# Patient Record
Sex: FEMALE | Race: WHITE | NOT HISPANIC OR LATINO | Employment: OTHER | URBAN - METROPOLITAN AREA
[De-identification: names, ages, dates, MRNs, and addresses within clinical notes are randomized per-mention and may not be internally consistent; named-entity substitution may affect disease eponyms.]

---

## 2017-01-09 ENCOUNTER — OFFICE VISIT (OUTPATIENT)
Dept: FAMILY MEDICINE | Facility: CLINIC | Age: 52
End: 2017-01-09
Payer: COMMERCIAL

## 2017-01-09 VITALS
HEIGHT: 67 IN | BODY MASS INDEX: 22.41 KG/M2 | WEIGHT: 142.8 LBS | OXYGEN SATURATION: 99 % | DIASTOLIC BLOOD PRESSURE: 82 MMHG | HEART RATE: 72 BPM | SYSTOLIC BLOOD PRESSURE: 128 MMHG | TEMPERATURE: 98.2 F

## 2017-01-09 DIAGNOSIS — R42 VERTIGO: Primary | ICD-10-CM

## 2017-01-09 DIAGNOSIS — A04.72 C. DIFFICILE DIARRHEA: ICD-10-CM

## 2017-01-09 LAB
C DIFF TOX B STL QL: NORMAL
SPECIMEN SOURCE: NORMAL

## 2017-01-09 PROCEDURE — 87493 C DIFF AMPLIFIED PROBE: CPT | Mod: 90 | Performed by: NURSE PRACTITIONER

## 2017-01-09 PROCEDURE — 99214 OFFICE O/P EST MOD 30 MIN: CPT | Performed by: NURSE PRACTITIONER

## 2017-01-09 PROCEDURE — 99000 SPECIMEN HANDLING OFFICE-LAB: CPT | Performed by: NURSE PRACTITIONER

## 2017-01-09 RX ORDER — HYDROCHLOROTHIAZIDE 12.5 MG/1
12.5 TABLET ORAL DAILY
Qty: 30 TABLET | Refills: 1 | Status: SHIPPED | OUTPATIENT
Start: 2017-01-09 | End: 2017-03-30

## 2017-01-09 NOTE — Clinical Note
[facility]          [address] [place] [place] [address]                                                                                                       [phone]      Khushi Sawant  [address] [place]   [facility] [address]      Dear Khushi,      The results of your recent test were normal.  Enclosed is a copy of the results.      Thank you for choosing [facility]. We appreciate the opportunity to serve you and look forward to supporting your healthcare needs in the future.    If you have any questions or concerns, please call me or my nurse at [phone].        Sincerely,      Manjula Sargent NP/Bela Ontiveros     Results for orders placed or performed in visit on 01/09/17   Clostridium difficile toxin B PCR   Result Value Ref Range    Specimen Description Feces     C Diff Toxin B PCR  NEG     Negative  Negative: Clostridium difficile target DNA sequences NOT detected, presumed   negative for Clostridium difficile toxin B or the number of bacteria present   may be below the limit of detection for the test.   FDA approved assay performed using [employer] GeneXpert real-time PCR.   A negative result does not exclude actual disease due to Clostridium difficile   and may be due to improper collection, handling and storage of the specimen or   the number of organisms in the specimen is below the detection limit of the   assay.

## 2017-01-09 NOTE — NURSING NOTE
"Chief Complaint   Patient presents with     Dizziness       Initial /82 mmHg  Pulse 72  Temp(Src) 98.2  F (36.8  C) (Oral)  Ht 5' 6.5\" (1.689 m)  Wt 142 lb 12.8 oz (64.774 kg)  BMI 22.71 kg/m2  SpO2 99% Estimated body mass index is 22.71 kg/(m^2) as calculated from the following:    Height as of this encounter: 5' 6.5\" (1.689 m).    Weight as of this encounter: 142 lb 12.8 oz (64.774 kg).  BP completed using cuff size: regular Veronica Pérez CMA      "

## 2017-01-09 NOTE — PROGRESS NOTES
SUBJECTIVE:                                                    Khushi Sawant is a 51 year old female who presents to clinic today for the following health issues:      Dizziness      Duration: before thanksgiving     Description   Feeling faint:  YES  Feeling like the surroundings are moving: YES  Loss of consciousness or falls: no     Intensity:  Severe on Saturday     Accompanying signs and symptoms:   Nausea/vomitting: YES  Palpitations: YES  Weakness in arms or legs: YES  Vision or speech changes: no   Ringing in ears (Tinnitus): YES- humming  Hearing loss related to dizziness: no   Other (fevers/chills/sweating/dyspnea): no     History (similar episodes/head trauma/previous evaluation/recent bleeding): yes - due to ear infections in the past     Precipitating or alleviating factors (new meds/chemicals): None  Worse with activity/head movement: no     Therapies tried and outcome: None   Khushi is here with several concerns.  1. Dizziness that has been present on and off for the past 2 months. She has been using meclizine over-the-counter with some relief. Patient is getting more upset because symptoms seem to be more frequent. Patient states she is just not feeling well. She spent the entire weekend in bed with nausea. Dizziness is interfering with her active lifestyle.    2. Questions about C. Difficile colitis. Patient was diagnosed with C. Difficile and recently finished a course ofFlagyl. She is hoping to be retested to make sure the infection has resolved. She is feeling much better since taking the antibiotic.She continues to take probiotics and is aware of the restrictions for future antibiotic use.          Problem list and histories reviewed & adjusted, as indicated.  Additional history: none    Problem list, Medication list, Allergies, and Medical/Social/Surgical histories reviewed in [facility] and updated as appropriate.    ROS:  Constitutional, HEENT, cardiovascular, pulmonary, gi and gu  "systems are negative, except as otherwise noted.    OBJECTIVE:                                                    /82 mmHg  Pulse 72  Temp(Src) 98.2  F (36.8  C) (Oral)  Ht 5' 6.5\" (1.689 m)  Wt 142 lb 12.8 oz (64.774 kg)  BMI 22.71 kg/m2  SpO2 99%  Body mass index is 22.71 kg/(m^2).  GENERAL: healthy, alert and no distress  EYES: Eyes grossly normal to inspection, PERRL and conjunctivae and sclerae normal  HENT: ear canals and TM's normal, nose and mouth without ulcers or lesions  NECK: no adenopathy, no asymmetry, masses, or scars and thyroid normal to palpation  RESP: lungs clear to auscultation - no rales, rhonchi or wheezes  CV: regular rate and rhythm, normal S1 S2, no S3 or S4, no murmur, click or rub, no peripheral edema and peripheral pulses strong         ASSESSMENT/PLAN:                                                            1. C. difficile diarrhea  Will retest to make sure C. Difficile toxin is cleared. Encourage use of probiotics daily.  - Clostridium difficile toxin B PCR; Future  - Clostridium difficile toxin B PCR    2. Vertigo   Will refer to dizzy and balance center to help with vertigo. Continue with meclizine as directed. I have added hydrochlorothiazide as a means to get rid of some of the extra fluid within the sinus cavities. I have seen it Used in the past by ENT specialists to help with vertigo.      Follow up in clinic if symptoms do not improve within the next 1-2 weeks.     Manjula Sargent, NP  [facility]    "

## 2017-01-09 NOTE — MR AVS SNAPSHOT
After Visit Summary   1/9/2017    Khushi Sawant    MRN: [id number]           Patient Information     Date Of Birth          1965        Visit Information        Provider Department      1/9/2017 10:00 AM Manjula Sargent NP [facility]        Today's Diagnoses     Vertigo    -  1     C. difficile diarrhea            Follow-ups after your visit        Additional Services     PHYSICAL THERAPY REFERRAL       *This therapy referral will be filtered to a centralized scheduling office at [facility] and the patient will receive a call to schedule an appointment at a [facility] location most convenient for them. *     [facility] provides Physical Therapy evaluation and treatment and many specialty services across the [facility] system.  If requesting a specialty program, please choose from the list below.    If you have not heard from the scheduling office within 2 business days, please call [phone] for all locations, with the exception of [place], please call [phone].  Treatment: Evaluation & Treatment  Special Instructions/Modalities:   Special Programs: Balance/Vestibular    Please be aware that coverage of these services is subject to the terms and limitations of your health insurance plan.  Call member services at your health plan with any benefit or coverage questions.      **Note to Provider:  If you are referring outside of [facility] for the therapy appointment, please list the name of the location in the  special instructions  above, print the referral and give to the patient to schedule the appointment.                  Your next 10 appointments already scheduled     Jan 12, 2017 10:20 AM   Office Visit with Manjula Sargent NP   [facility]    [address] [facility] [address] [phone]           Bring a current list of meds and any records pertaining to this visit.   "For Physicals, please bring immunization records and any forms needing to be filled out.  Please arrive 10 minutes early to complete paperwork.            2017  8:45 AM   Evaluation with Hardy Gandara PT   [facility] ([facility])    [address]  [facility] [id number] [phone]              Who to contact     If you have questions or need follow up information about today's clinic visit or your schedule please contact [facility] directly at [phone].  Normal or non-critical lab and imaging results will be communicated to you by [employer]at[employer], letter or phone within 4 business days after the clinic has received the results. If you do not hear from us within 7 days, please contact the clinic through [employer] or phone. If you have a critical or abnormal lab result, we will notify you by phone as soon as possible.  Submit refill requests through [employer] or call your pharmacy and they will forward the refill request to us. Please allow 3 business days for your refill to be completed.          Additional Information About Your Visit        [employer] Information     [employer] lets you send messages to your doctor, view your test results, renew your prescriptions, schedule appointments and more. To sign up, go to [email or web address] . Click on \"Log in\" on the left side of the screen, which will take you to the Welcome page. Then click on \"Sign up Now\" on the right side of the page.     You will be asked to enter the access code listed below, as well as some personal information. Please follow the directions to create your username and password.     Your access code is: [email or web address]  Expires: 2017  2:13 PM     Your access code will  in 90 days. If you need help or a new code, please call your [place] clinic or [phone].        Care EveryWhere ID     This is your Care EveryWhere ID. This could be used by other organizations to access " "your [place] medical records  [id number]        Your Vitals Were     Pulse Temperature Height BMI (Body Mass Index) Pulse Oximetry       72 98.2  F (36.8  C) (Oral) 5' 6.5\" (1.689 m) 22.71 kg/m2 99%        Blood Pressure from Last 3 Encounters:   01/09/17 128/82   12/26/16 130/66   11/16/16 128/80    Weight from Last 3 Encounters:   01/09/17 142 lb 12.8 oz (64.774 kg)   12/26/16 143 lb (64.864 kg)   11/16/16 142 lb 11.2 oz (64.728 kg)              We Performed the Following     Clostridium difficile toxin B PCR     PHYSICAL THERAPY REFERRAL          Today's Medication Changes          These changes are accurate as of: 1/9/17 11:59 PM.  If you have any questions, ask your nurse or doctor.               Start taking these medicines.        Dose/Directions    hydrochlorothiazide 12.5 MG Tabs tablet   Started by:  Manjula Sargent NP        Dose:  12.5 mg   Take 1 tablet (12.5 mg) by mouth daily   Quantity:  30 tablet   Refills:  1         Stop taking these medicines if you haven't already. Please contact your care team if you have questions.     martínez root 550 MG Caps capsule   Stopped by:  Manjula Sargent NP           ranitidine 150 MG/10ML syrup   Commonly known as:  Zantac   Stopped by:  Manjula Sargent NP                Where to get your medicines      These medications were sent to [facility] [address] [place] [address] [id number] [place] [address] AT [address] [address] [facility] [address]     Phone:  [phone]    - hydrochlorothiazide 12.5 MG Tabs tablet             Primary Care Provider Office Phone # Fax #    Manjula Sargetn [phone]       [facility] [address]  [facility] [address]        Thank you!     Thank you for choosing [facility]  for your care. Our goal is always to provide you with excellent care. Hearing back from our patients is one way we can continue to improve our services. Please take a few minutes to complete the " written survey that you may receive in the mail after your visit with us. Thank you!             Your Updated Medication List - Protect others around you: Learn how to safely use, store and throw away your medicines at www.disposemymeds.org.          This list is accurate as of: 1/9/17 11:59 PM.  Always use your most recent med list.                   Brand Name Dispense Instructions for use    ACIDOPHILUS PROBIOTIC BLEND Caps      Take by mouth daily       hydrochlorothiazide 12.5 MG Tabs tablet     30 tablet    Take 1 tablet (12.5 mg) by mouth daily       MECLIZINE HCL PO      Take 25 mg by mouth as needed for dizziness       metroNIDAZOLE 500 MG tablet    FLAGYL    30 tablet    Take 1 tablet (500 mg) by mouth 3 times daily

## 2017-01-10 ENCOUNTER — TELEPHONE (OUTPATIENT)
Dept: FAMILY MEDICINE | Facility: CLINIC | Age: 52
End: 2017-01-10

## 2017-01-10 ENCOUNTER — TELEPHONE (OUTPATIENT)
Dept: NURSING | Facility: CLINIC | Age: 52
End: 2017-01-10

## 2017-01-10 NOTE — TELEPHONE ENCOUNTER
"Pt calling she started the hydroxyzine this yesterday and today.   She did eat a hamberger last night, \"it did kind of upset my stomach\"     Pt started with diarrhea today.  \"it's very loose and watery\"   She has gone back to the liquid diet for today.  She did complete full course of flagyl on Sunday.   No stomach pain and vertigo is improved.      Advised to continue clear liquid diet tonight and will discuss with PCP tomorrow.      Call back to cell [phone]    Chiquita Beebe RN    "

## 2017-01-10 NOTE — TELEPHONE ENCOUNTER
Call Type: Triage Call    Presenting Problem: Khushi is asking for results on her c-diff  test that was done yesterday. advised per EPIC, results negative, no  c-diff detected.  Triage Note:  Guideline Title: Information Only Call; No Symptom Triage (Adult)  Recommended Disposition: Provide Information or Advice Only  Original Inclination: Wanted to speak with a nurse  Override Disposition:  Intended Action: Follow advice given  Physician Contacted: No  Requesting information regarding scheduled exam, test or procedure; no triage  required. Information provided from approved resources or clinical experience. ?  YES  Requesting regular office appointment ? NO  Sign(s) or symptom(s) associated with a diagnosed condition or with a new illness  ? NO  Requesting information about provider, services or community resources ? NO  Call back to complete assessment/clarification of information from prior caller to  complete triage ? NO  Requesting information and provider is best resource; no triage required. ? NO  Caller not with patient and is unable to provide clinical information about  patient to facilitate triage. ? NO  Requesting provider information for recently scheduled test, procedure; no triage  required. Needed information not available per approved resources or clinical  experience. ? NO  Requesting information not available per approved reference or clinical  experience; no triage required. ? NO  Physician Instructions:  Care Advice:

## 2017-01-12 ENCOUNTER — OFFICE VISIT (OUTPATIENT)
Dept: FAMILY MEDICINE | Facility: CLINIC | Age: 52
End: 2017-01-12
Payer: COMMERCIAL

## 2017-01-12 VITALS
TEMPERATURE: 98.5 F | HEIGHT: 67 IN | OXYGEN SATURATION: 99 % | DIASTOLIC BLOOD PRESSURE: 80 MMHG | BODY MASS INDEX: 21.56 KG/M2 | SYSTOLIC BLOOD PRESSURE: 110 MMHG | HEART RATE: 71 BPM | WEIGHT: 137.4 LBS

## 2017-01-12 DIAGNOSIS — R11.0 NAUSEA: Primary | ICD-10-CM

## 2017-01-12 LAB
ALBUMIN UR-MCNC: NEGATIVE MG/DL
APPEARANCE UR: CLEAR
BACTERIA #/AREA URNS HPF: ABNORMAL /HPF
BILIRUB UR QL STRIP: NEGATIVE
COLOR UR AUTO: YELLOW
ERYTHROCYTE [DISTWIDTH] IN BLOOD BY AUTOMATED COUNT: 12.2 % (ref 10–15)
GLUCOSE UR STRIP-MCNC: NEGATIVE MG/DL
HCT VFR BLD AUTO: 46.2 % (ref 35–47)
HGB BLD-MCNC: 15.5 G/DL (ref 11.7–15.7)
HGB UR QL STRIP: ABNORMAL
KETONES UR STRIP-MCNC: ABNORMAL MG/DL
LEUKOCYTE ESTERASE UR QL STRIP: NEGATIVE
MCH RBC QN AUTO: 31.6 PG (ref 26.5–33)
MCHC RBC AUTO-ENTMCNC: 33.5 G/DL (ref 31.5–36.5)
MCV RBC AUTO: 94 FL (ref 78–100)
NITRATE UR QL: NEGATIVE
NON-SQ EPI CELLS #/AREA URNS LPF: ABNORMAL /LPF
PH UR STRIP: 6 PH (ref 5–7)
PLATELET # BLD AUTO: 173 10E9/L (ref 150–450)
RBC # BLD AUTO: 4.9 10E12/L (ref 3.8–5.2)
RBC #/AREA URNS AUTO: ABNORMAL /HPF (ref 0–2)
SP GR UR STRIP: 1.01 (ref 1–1.03)
URN SPEC COLLECT METH UR: ABNORMAL
UROBILINOGEN UR STRIP-ACNC: 0.2 EU/DL (ref 0.2–1)
WBC # BLD AUTO: 5.1 10E9/L (ref 4–11)
WBC #/AREA URNS AUTO: ABNORMAL /HPF (ref 0–2)

## 2017-01-12 PROCEDURE — 36415 COLL VENOUS BLD VENIPUNCTURE: CPT | Performed by: NURSE PRACTITIONER

## 2017-01-12 PROCEDURE — 81001 URINALYSIS AUTO W/SCOPE: CPT | Performed by: NURSE PRACTITIONER

## 2017-01-12 PROCEDURE — 99214 OFFICE O/P EST MOD 30 MIN: CPT | Performed by: NURSE PRACTITIONER

## 2017-01-12 PROCEDURE — 85027 COMPLETE CBC AUTOMATED: CPT | Performed by: NURSE PRACTITIONER

## 2017-01-12 RX ORDER — ONDANSETRON 4 MG/1
4 TABLET, FILM COATED ORAL EVERY 6 HOURS PRN
Qty: 18 TABLET | Refills: 0 | Status: SHIPPED | OUTPATIENT
Start: 2017-01-12 | End: 2017-03-30

## 2017-01-12 NOTE — NURSING NOTE
"Chief Complaint   Patient presents with     RECHECK       Initial /80 mmHg  Pulse 71  Temp(Src) 98.5  F (36.9  C) (Oral)  Ht 5' 6.5\" (1.689 m)  Wt 137 lb 6.4 oz (62.324 kg)  BMI 21.85 kg/m2  SpO2 99%  Breastfeeding? No Estimated body mass index is 21.85 kg/(m^2) as calculated from the following:    Height as of this encounter: 5' 6.5\" (1.689 m).    Weight as of this encounter: 137 lb 6.4 oz (62.324 kg).  BP completed using cuff size: regular right arm   JASON Hooper      "

## 2017-01-12 NOTE — PROGRESS NOTES
"  SUBJECTIVE:                                                    Khushi Sawant is a 51 year old female who presents to clinic today for the following health issues:      Patient is here to follow up on vertigo, nausea and not feeling well. Recently treated for c difficile colitis and has since tested negative. Typically feels well and is frustrated with intermittent nausea and vertigo. Has been eating a bland diet and diarrhea and nausea improved. Ate a hamburger on Monday evening and had three two days of diarrhea. Is worried the diarrhea will not improve. Tearful and frustrated as not exercising and has low energy.  has come to the appointment as well. Has some back pain and slight nausea. Vertigo has improved and soft formed stool this morning. Plan is to leave for [place] this weekend if feeling better. Has many questions about cdiff and nausea and infection.         Problem list and histories reviewed & adjusted, as indicated.  Additional history: none    Problem list, Medication list, Allergies, and Medical/Social/Surgical histories reviewed in EPIC and updated as appropriate.    ROS:  Constitutional, HEENT, cardiovascular, pulmonary, gi and gu systems are negative, except as otherwise noted.    OBJECTIVE:                                                    /80 mmHg  Pulse 71  Temp(Src) 98.5  F (36.9  C) (Oral)  Ht 5' 6.5\" (1.689 m)  Wt 137 lb 6.4 oz (62.324 kg)  BMI 21.85 kg/m2  SpO2 99%  Breastfeeding? No  Body mass index is 21.85 kg/(m^2).  GENERAL: healthy, alert and no distress. Frustrated.   RESP: lungs clear to auscultation - no rales, rhonchi or wheezes  CV: regular rate and rhythm, normal S1 S2, no S3 or S4, no murmur, click or rub, no peripheral edema and peripheral pulses strong  ABDOMEN: soft, mildly tender, no hepatosplenomegaly, no masses and bowel sounds normal  BACK: no CVA tenderness, no paralumbar tenderness  PSYCH: mentation appears normal, affect " normal/bright    Results for orders placed or performed in visit on 01/12/17 (from the past 24 hour(s))   CBC with platelets   Result Value Ref Range    WBC 5.1 4.0 - 11.0 10e9/L    RBC Count 4.90 3.8 - 5.2 10e12/L    Hemoglobin 15.5 11.7 - 15.7 g/dL    Hematocrit 46.2 35.0 - 47.0 %    MCV 94 78 - 100 fl    MCH 31.6 26.5 - 33.0 pg    MCHC 33.5 31.5 - 36.5 g/dL    RDW 12.2 10.0 - 15.0 %    Platelet Count 173 150 - 450 10e9/L   UA reflex to Microscopic and Culture   Result Value Ref Range    Color Urine Yellow     Appearance Urine Clear     Glucose Urine Negative NEG mg/dL    Bilirubin Urine Negative NEG    Ketones Urine Trace (A) NEG mg/dL    Specific Gravity Urine 1.015 1.003 - 1.035    Blood Urine Trace (A) NEG    pH Urine 6.0 5.0 - 7.0 pH    Protein Albumin Urine Negative NEG mg/dL    Urobilinogen Urine 0.2 0.2 - 1.0 EU/dL    Nitrite Urine Negative NEG    Leukocyte Esterase Urine Negative NEG    Source Midstream Urine    Urine Microscopic   Result Value Ref Range    WBC Urine O - 2 0 - 2 /HPF    RBC Urine O - 2 0 - 2 /HPF    Squamous Epithelial /LPF Urine Few FEW /LPF    Bacteria Urine Few (A) NEG /HPF        ASSESSMENT/PLAN:                                                            1. Nausea  Will check CBC looking for infection. Zofran for nausea. Much of the visit spent discussing a bland diet and appropriate foods.   - CBC with platelets  - UA reflex to Microscopic and Culture  - ondansetron (ZOFRAN) 4 MG tablet; Take 1 tablet (4 mg) by mouth every 6 hours as needed for nausea  Dispense: 18 tablet; Refill: 0  - Urine Microscopic    Patient is reassured with findings of negative urine and normal CBC. Encouraged bland diet and slowly advancing diet. Avoid spicy foods, alcohol, coffee, and citrus as may irritate her abdomen. No signs of kidney infection and soreness if from nausea and vomiting and possible contaminated hamburger. Encouraged starting to be more active. Follow up as needed.     Manjula Sargent,  NP  [facility]

## 2017-01-23 ENCOUNTER — HOSPITAL ENCOUNTER (OUTPATIENT)
Dept: PHYSICAL THERAPY | Facility: CLINIC | Age: 52
Setting detail: THERAPIES SERIES
End: 2017-01-23
Attending: NURSE PRACTITIONER
Payer: COMMERCIAL

## 2017-01-23 PROCEDURE — 97161 PT EVAL LOW COMPLEX 20 MIN: CPT | Mod: GP | Performed by: PHYSICAL THERAPIST

## 2017-01-23 PROCEDURE — 40000840 ZZHC STATISTIC PT VESTIBULAR VISIT: Performed by: PHYSICAL THERAPIST

## 2017-01-23 NOTE — PROGRESS NOTES
01/23/17 0837   Quick Adds   Type of Visit Initial OP PT Evaluation   General Information   Start of Care Date 01/23/17   Referring Physician Manjula Sargent, NELY   Orders Evaluate and Treat as Indicated   Order Date 01/09/17   Medical Diagnosis Vertigo  (per documentation, none listed on order)   Onset of illness/injury or Date of Surgery (~2 months ago before Thanksgiving)   Precautions/Limitations no known precautions/limitations   Surgical/Medical history reviewed Yes   Pertinent history of current problem (include personal factors and/or comorbidities that impact the POC) Pt presents to PT to address symptoms of vertigo that first began ~1 yr ago after her first colonoscopy with subsequent recurrent bouts of vertigo and ear fullness every 2-3 months.  She had an ear infection around Thanksgiving with symptoms, was placed on antibiotics, symptoms seemed to improved but again had worsening symptoms more recently ~1 week ago.  Pt was prescribed HCT to help eliminate any potential fluid collection within her inner ears which seemed to help, but she discontinued use due to episode of diarrhea, now plans to resume taking it soon, has 2 month supply from her NP.  She was using over the counter Meclizine with some relief, but symptoms have continued to worsen/linger, recently had to spend the entire weekend in bed with nausea and dizziness is interfering with her active lifestyle.  She reports hx of ear/sinus infections with similar symptoms in the past. Recent diagnosis of C-diff, treated with Flagyl and Antibiotics, now finished, but continued loose stools, negative CBC for any infection, trying bland die, plans to resume HCT, and recommended to resume activity per her NP/PCP.  She saw an ENT in 2016 with no answers.  She last took Meclizine in December.  She endorses constant mild tinnitus, denies hearing loss.   Pertinent Visual History  she reports general aging of her eyes/vision, obtained cheaters for reading, no  significant vision issues   Prior level of function comment indep and active PLOF, enjoys running for exercise daily, but has not been able to participate due to symptoms lately   Previous/Current Treatment Medication(s)  (Hydrochlorothyazide, Meclizine, Zofran)   Improvement after medication Moderate   Current Community Support (spouse)   Patient role/Employment history Employed  (self employed as )   Living environment [facility]   Current Assistive Devices (none)   Assistive Devices Comments no use of AD, indep   Patient/Family Goals Statement Pt hopes to get some answers and resolve/manage her symptoms better so she can resume working and exercise   Fall Risk Screen   Fall screen completed by PT   Per patient - Fall 2 or more times in past year? No   Per patient - Fall with injury in past year? No   Is patient a fall risk? No   Fall screen comments 12/12 on 4-item DGI   System Outcome Measures   Outcome Measures BPPV   Dizziness Handicap Inventory (score out of 100) A decrease in score by 17.18 or greater indicates a clinically significant change in symptoms. 24   At end of session, is nystagmus present that is representative of a BPPV pattern with positional testing? No   Pain   Patient currently in pain No   Cognitive Status Examination   Orientation orientation to person, place and time   Integumentary   Integumentary No deficits were identified   Posture   Posture Normal   Range of Motion (ROM)   ROM Comment WFL in all extremities and c-spine   Strength   Strength Comments WFL and symmetrical to MMT screen in jean paul UEs and LEs, 5/5 throughout.   Bed Mobility   Bed Mobility Comments INdep, no dizziness today   Transfer Skills   Transfer Comments Indep, steady on feet, no dizziness today but endorses worse symptoms at times when she stands still, improves with sitting or walking around   Gait   Gait Comments Indep and steady gait, normal gait speed, no AD.  Reciprocal gait pattern, changes speeds and  performs dynamic tasks with ease.   Gait Special Tests   Gait Special Tests DYNAMIC GAIT INDEX   Gait Special Tests Dynamic Gait Index   Comments 12/12 on 4-item DGI   Balance   Balance Comments normal balance, no impairment noted today   Balance Special Tests   Balance Special Tests Modified CTSIB Conditions   Balance Special Tests Modified CTSIB Conditions   Condition 1, seconds 30 Seconds   Condition 2, seconds 30 Seconds   Condition 4, seconds 30 Seconds   Condition 5, seconds 30 Seconds   Sensory Examination   Sensory Perception no deficits were identified   Sensory Perception Comments pt denies hearing loss   Coordination   Coordination no deficits were identified   Coordination Comments normal finger to nose and visual tracking   Muscle Tone   Muscle Tone no deficits were identified   Oculomotor Exam   Smooth Pursuit Normal   Saccades Normal   VOR Normal   Rapid Head Thrust Normal   Convergence Testing Normal   Infrared Goggle Exam or Frenzel Lense Exam   Vestibular Suppressant in Last 24 Hours? No   Exam completed with Infrared Goggles   Spontaneous Nystagmus Negative   Gaze Evoked Nystagmus Negative   Head Shake Horizontal Nystagmus Negative   Positional testing Negative   Positional Testing comments Negative VBI testing bilaterally.  Increase in symptoms with bearing down/valsalva while plugging nose in sitting and standing, symptoms resolve with release of pressure.   Jorge-Hallpike (right) Negative   Jorge-Hallpike (Left) Negative   HSCC Supine Roll Test (Right) Negative   HSCC Supine Roll Test (Left) Negative   Planned Therapy Interventions   Planned Therapy Interventions balance training;neuromuscular re-education;other (see comments)  (CRM as indicated)   Clinical Impression   Criteria for Skilled Therapeutic Interventions Met yes, treatment indicated  (if symptoms recur)   PT Diagnosis Vertigo with impaired functional activity tolerance   Influenced by the following impairments episodic vertigo with  nausea   Functional limitations due to impairments 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, climbing ladders, and return to daily exercise regimine (jogging)   Clinical Presentation Stable/Uncomplicated   Clinical Presentation Rationale low/minimal symptoms on this date, negative CBC and c-diff results, no findings with ENT, no involvment of vestibular or balance system currently   Clinical Decision Making (Complexity) Low complexity   Therapy Frequency (consider 1x/wk if symptoms recur, pt will call within 4 wks)   Predicted Duration of Therapy Intervention (days/wks) 6 weeks   Risk & Benefits of therapy have been explained Yes   Patient, Family & other staff in agreement with plan of care Yes   Clinical Impression Comments Currently so signs of BPPV or vestibular hypofunction, good/normal functional balance.  Episodic symptoms and ability to recreate symptoms with valsalva maneuver consistent with Meniere's or similiar process.  Advised pt to monitor/limit Sodium consumption.   Education Assessment   Preferred Learning Style Listening;Reading;Demonstration;Pictures/video   Barriers to Learning No barriers   GOALS   PT Eval Goals 1   Goal 1   Goal Identifier Activity   Goal Description Pt will report ability to fully return to work related duties as a  and daily jogging exercise program without symptoms for improved QOL.   Target Date 02/24/17   Total Evaluation Time   Total Evaluation Time (Minutes) 35

## 2017-02-03 ENCOUNTER — MYC MEDICAL ADVICE (OUTPATIENT)
Dept: FAMILY MEDICINE | Facility: CLINIC | Age: 52
End: 2017-02-03

## 2017-02-15 ENCOUNTER — MYC MEDICAL ADVICE (OUTPATIENT)
Dept: FAMILY MEDICINE | Facility: CLINIC | Age: 52
End: 2017-02-15

## 2017-02-15 ENCOUNTER — OFFICE VISIT (OUTPATIENT)
Dept: FAMILY MEDICINE | Facility: CLINIC | Age: 52
End: 2017-02-15
Payer: COMMERCIAL

## 2017-02-15 VITALS
TEMPERATURE: 98.4 F | DIASTOLIC BLOOD PRESSURE: 80 MMHG | HEART RATE: 66 BPM | RESPIRATION RATE: 12 BRPM | OXYGEN SATURATION: 98 % | WEIGHT: 139.3 LBS | BODY MASS INDEX: 21.87 KG/M2 | HEIGHT: 67 IN | SYSTOLIC BLOOD PRESSURE: 110 MMHG

## 2017-02-15 DIAGNOSIS — R42 DIZZINESS: Primary | ICD-10-CM

## 2017-02-15 PROCEDURE — 99214 OFFICE O/P EST MOD 30 MIN: CPT | Performed by: NURSE PRACTITIONER

## 2017-02-15 RX ORDER — FLUTICASONE PROPIONATE 50 MCG
1 SPRAY, SUSPENSION (ML) NASAL DAILY
COMMUNITY
End: 2017-03-30

## 2017-02-15 NOTE — NURSING NOTE
"Chief Complaint   Patient presents with     Dizziness     lightheadedness       Initial /80  Pulse 66  Temp 98.4  F (36.9  C) (Oral)  Resp 12  Ht 5' 6.5\" (1.689 m)  Wt 139 lb 4.8 oz (63.2 kg)  SpO2 98%  Breastfeeding? No  BMI 22.15 kg/m2 Estimated body mass index is 22.15 kg/(m^2) as calculated from the following:    Height as of this encounter: 5' 6.5\" (1.689 m).    Weight as of this encounter: 139 lb 4.8 oz (63.2 kg).  Medication Reconciliation: complete   JASON Hooper      "

## 2017-02-15 NOTE — MR AVS SNAPSHOT
After Visit Summary   2/15/2017    Khushi Sawant    MRN: [id number]           Patient Information     Date Of Birth          1965        Visit Information        Provider Department      2/15/2017 1:00 PM Manjula Sargent NP [facility]        Today's Diagnoses     Dizziness    -  1       Follow-ups after your visit        Additional Services     OTOLARYNGOLOGY REFERRAL       Your provider has referred you to: [facility] ER: [facility] [place] [phone]   [email or web address]    Please be aware that coverage of these services is subject to the terms and limitations of your health insurance plan.  Call member services at your health plan with any benefit or coverage questions.      Please bring the following with you to your appointment:    (1) Any X-Rays, CTs or MRIs which have been performed.  Contact the facility where they were done to arrange for  prior to your scheduled appointment.   (2) List of current medications  (3) This referral request   (4) Any documents/labs given to you for this referral                  Future tests that were ordered for you today     Open Future Orders        Priority Expected Expires Ordered    CT Maxillofacial w/o Contrast Routine  2/15/2018 2/15/2017            Who to contact     If you have questions or need follow up information about today's clinic visit or your schedule please contact [facility] directly at [phone].  Normal or non-critical lab and imaging results will be communicated to you by MyChart, letter or phone within 4 business days after the clinic has received the results. If you do not hear from us within 7 days, please contact the clinic through MyChart or phone. If you have a critical or abnormal lab result, we will notify you by phone as soon as possible.  Submit refill requests through [employer] or call your pharmacy and they will forward the refill request to us.  "Please allow 3 business days for your refill to be completed.          Additional Information About Your Visit        [employer] Information     [employer] gives you secure access to your electronic health record. If you see a primary care provider, you can also send messages to your care team and make appointments. If you have questions, please call your primary care clinic.  If you do not have a primary care provider, please call [phone] and they will assist you.        Care EveryWhere ID     This is your Care EveryWhere ID. This could be used by other organizations to access your [facility] medical records  [id number]        Your Vitals Were     Pulse Temperature Respirations Height Pulse Oximetry Breastfeeding?    66 98.4  F (36.9  C) (Oral) 12 5' 6.5\" (1.689 m) 98% No    BMI (Body Mass Index)                   22.15 kg/m2            Blood Pressure from Last 3 Encounters:   02/15/17 110/80   01/12/17 110/80   01/09/17 128/82    Weight from Last 3 Encounters:   02/15/17 139 lb 4.8 oz (63.2 kg)   01/12/17 137 lb 6.4 oz (62.3 kg)   01/09/17 142 lb 12.8 oz (64.8 kg)              We Performed the Following     OTOLARYNGOLOGY REFERRAL        Primary Care Provider Office Phone # Fax #    Manjula Sargent [phone]       [facility] [address] [address] [facility] [address]        Thank you!     Thank you for choosing [facility]  for your care. Our goal is always to provide you with excellent care. Hearing back from our patients is one way we can continue to improve our services. Please take a few minutes to complete the written survey that you may receive in the mail after your visit with us. Thank you!             Your Updated Medication List - Protect others around you: Learn how to safely use, store and throw away your medicines at www.disposemymeds.org.          This list is accurate as of: 2/15/17  1:45 PM.  Always use your most recent med list.                   Brand Name " Dispense Instructions for use    ACIDOPHILUS PROBIOTIC BLEND Caps      Take by mouth daily       fluticasone 50 MCG/ACT spray    FLONASE     Spray 1 spray into both nostrils daily       hydrochlorothiazide 12.5 MG Tabs tablet     30 tablet    Take 1 tablet (12.5 mg) by mouth daily       MECLIZINE HCL PO      Take 25 mg by mouth as needed for dizziness       ondansetron 4 MG tablet    ZOFRAN    18 tablet    Take 1 tablet (4 mg) by mouth every 6 hours as needed for nausea

## 2017-02-15 NOTE — TELEPHONE ENCOUNTER
"Pt calling still having issues with lightheadedness.   She feels it is not so much dizziness at this time.       Pt states she did [facility] and they did not feel she had vertigo.     Pt states last night the symptoms where so bad \"I had heart palpitations and almost passed out\"       Pt advised needs to be seen as this is a change in symptoms     Scheduled with PCP today at 1 pm.     Chiquita Beebe, RN      "

## 2017-02-17 ENCOUNTER — HOSPITAL ENCOUNTER (OUTPATIENT)
Dept: CT IMAGING | Facility: CLINIC | Age: 52
Discharge: HOME | End: 2017-02-17
Attending: NURSE PRACTITIONER | Admitting: NURSE PRACTITIONER
Payer: COMMERCIAL

## 2017-02-17 DIAGNOSIS — R42 DIZZINESS: ICD-10-CM

## 2017-02-17 PROCEDURE — 70486 CT MAXILLOFACIAL W/O DYE: CPT

## 2017-02-22 ENCOUNTER — MYC MEDICAL ADVICE (OUTPATIENT)
Dept: FAMILY MEDICINE | Facility: CLINIC | Age: 52
End: 2017-02-22

## 2017-02-22 DIAGNOSIS — R42 DIZZINESS: Primary | ICD-10-CM

## 2017-02-24 DIAGNOSIS — R42 DIZZINESS: ICD-10-CM

## 2017-02-24 LAB
ALBUMIN SERPL-MCNC: 4.3 G/DL (ref 3.4–5)
ALP SERPL-CCNC: 50 U/L (ref 40–150)
ALT SERPL W P-5'-P-CCNC: 32 U/L (ref 0–50)
ANION GAP SERPL CALCULATED.3IONS-SCNC: 9 MMOL/L (ref 3–14)
AST SERPL W P-5'-P-CCNC: 20 U/L (ref 0–45)
BILIRUB SERPL-MCNC: 0.5 MG/DL (ref 0.2–1.3)
BUN SERPL-MCNC: 21 MG/DL (ref 7–30)
CALCIUM SERPL-MCNC: 9.3 MG/DL (ref 8.5–10.1)
CHLORIDE SERPL-SCNC: 104 MMOL/L (ref 94–109)
CO2 SERPL-SCNC: 27 MMOL/L (ref 20–32)
CREAT SERPL-MCNC: 0.66 MG/DL (ref 0.52–1.04)
GFR SERPL CREATININE-BSD FRML MDRD: ABNORMAL ML/MIN/1.7M2
GLUCOSE SERPL-MCNC: 109 MG/DL (ref 70–99)
POTASSIUM SERPL-SCNC: 3.8 MMOL/L (ref 3.4–5.3)
PROT SERPL-MCNC: 7.2 G/DL (ref 6.8–8.8)
SODIUM SERPL-SCNC: 140 MMOL/L (ref 133–144)
TSH SERPL DL<=0.05 MIU/L-ACNC: 0.75 MU/L (ref 0.4–4)
VIT B12 SERPL-MCNC: 501 PG/ML (ref 193–986)

## 2017-02-24 PROCEDURE — 82607 VITAMIN B-12: CPT | Performed by: NURSE PRACTITIONER

## 2017-02-24 PROCEDURE — 84443 ASSAY THYROID STIM HORMONE: CPT | Performed by: NURSE PRACTITIONER

## 2017-02-24 PROCEDURE — 36415 COLL VENOUS BLD VENIPUNCTURE: CPT | Performed by: NURSE PRACTITIONER

## 2017-02-24 PROCEDURE — 80053 COMPREHEN METABOLIC PANEL: CPT | Performed by: NURSE PRACTITIONER

## 2017-02-27 RX ORDER — MECLIZINE HYDROCHLORIDE 25 MG/1
25 TABLET ORAL PRN
Qty: 30 TABLET | Refills: 0 | Status: SHIPPED | OUTPATIENT
Start: 2017-02-27 | End: 2017-03-27

## 2017-02-27 NOTE — TELEPHONE ENCOUNTER
Pt is requesting a refill of antivert  Routing refill request to provider for review/approval because:  Medication is reported/historical  Tang Pryor RN, BSN

## 2017-03-06 NOTE — PROGRESS NOTES
"Outpatient Physical Therapy Discharge Note     Patient: Khushi Sawant  : 1965    Beginning/End Dates of Reporting Period:  17 to 2017    Referring Provider: Manjula Sargent NP    Therapy Diagnosis: Vertigo with impaired functional activity tolerance     Client Self Report: Taraal complete, see flowsheet.  Pt reports minimal/no symptoms today.  States she plans to resume taking Hydrochlorothyazide as prescribed by her NP, which seemed to help initially.  She states, \"I'm just hoping to find some answers\".    Objective Measurements:  Objective Measure: IR Exam  Details: Negative IR exam for BPPV or vestibular hypofunction, normal VOR and smooth pursuit.    Objective Measure: DVA  Details: 2-line loss with 2-hz dynamic visual acuity assessment (normal).  Static visual acuity 20/10, 20/15 at 2Hz head movement.    Objective Measure: 4-item DGI  Details: , indicating normal balance and dynamic gait stability     Outcome Measures (most recent score):  Dizziness Handicap Inventory (score out of 100) A decrease in score by 17.18 or greater indicates a clinically significant change in symptoms.: 24/100  At end of session, is nystagmus present that is representative of a BPPV pattern with positional testing?: No    Goals:  Goal Identifier Activity   Goal Description Pt will report ability to fully return to work related duties as a  and daily jogging exercise program without symptoms for improved QOL.   Target Date 17   Date Met      Progress:  Goal not addressed, pt did not return for follow-up sessions after eval       Progress Toward Goals:   Progress this reporting period: Pt was seen for an evaluation on 17.  Exam and testing did not indicate any peripheral vestibular disorder, normal balance and strength testing.  Only able to replicate symptoms by bearing down/performing valsalva maneuver.  Per pt, symptoms have been improving with prescribed Hydrochlorothiazide. Pt was " instructed to call within 4 weeks if any further symptoms/needs, otherwise plans to resume taking Hydrochlorthiazide as prescribed by her NP and limit her sodium intake. Pt did not call to set up any further appointments, no further need for skilled PT at this time.    Plan:  Discharge from therapy.    Discharge: yes    Reason for Discharge: Patient chooses to discontinue therapy.  Patient has failed to schedule further appointments.    Equipment Issued: none    Discharge Plan: pt is following with her PCP and adjusting medications to help manage any ear pressure/fluid issues.

## 2017-03-27 ENCOUNTER — TELEPHONE (OUTPATIENT)
Dept: FAMILY MEDICINE | Facility: CLINIC | Age: 52
End: 2017-03-27

## 2017-03-27 DIAGNOSIS — R42 DIZZINESS: ICD-10-CM

## 2017-03-27 RX ORDER — MECLIZINE HYDROCHLORIDE 25 MG/1
25 TABLET ORAL 3 TIMES DAILY PRN
Qty: 30 TABLET | Refills: 0
Start: 2017-03-27 | End: 2017-03-30

## 2017-03-30 ENCOUNTER — OFFICE VISIT (OUTPATIENT)
Dept: FAMILY MEDICINE | Facility: CLINIC | Age: 52
End: 2017-03-30
Payer: COMMERCIAL

## 2017-03-30 VITALS
HEART RATE: 82 BPM | RESPIRATION RATE: 14 BRPM | BODY MASS INDEX: 22.13 KG/M2 | HEIGHT: 67 IN | TEMPERATURE: 98.8 F | OXYGEN SATURATION: 100 % | WEIGHT: 141 LBS | DIASTOLIC BLOOD PRESSURE: 80 MMHG | SYSTOLIC BLOOD PRESSURE: 120 MMHG

## 2017-03-30 DIAGNOSIS — R82.90 NONSPECIFIC FINDING ON EXAMINATION OF URINE: ICD-10-CM

## 2017-03-30 DIAGNOSIS — Z00.00 ENCOUNTER FOR ROUTINE ADULT HEALTH EXAMINATION WITHOUT ABNORMAL FINDINGS: ICD-10-CM

## 2017-03-30 DIAGNOSIS — R30.0 DYSURIA: Primary | ICD-10-CM

## 2017-03-30 LAB
ALBUMIN UR-MCNC: >=300 MG/DL
APPEARANCE UR: ABNORMAL
BACTERIA #/AREA URNS HPF: ABNORMAL /HPF
BILIRUB UR QL STRIP: NEGATIVE
COLOR UR AUTO: YELLOW
GLUCOSE UR STRIP-MCNC: NEGATIVE MG/DL
HGB UR QL STRIP: ABNORMAL
KETONES UR STRIP-MCNC: NEGATIVE MG/DL
LEUKOCYTE ESTERASE UR QL STRIP: ABNORMAL
NITRATE UR QL: NEGATIVE
PH UR STRIP: 6 PH (ref 5–7)
RBC #/AREA URNS AUTO: ABNORMAL /HPF (ref 0–2)
SP GR UR STRIP: 1.02 (ref 1–1.03)
URN SPEC COLLECT METH UR: ABNORMAL
UROBILINOGEN UR STRIP-ACNC: 0.2 EU/DL (ref 0.2–1)
WBC #/AREA URNS AUTO: >100 /HPF (ref 0–2)

## 2017-03-30 PROCEDURE — 36415 COLL VENOUS BLD VENIPUNCTURE: CPT | Performed by: NURSE PRACTITIONER

## 2017-03-30 PROCEDURE — 81001 URINALYSIS AUTO W/SCOPE: CPT | Performed by: NURSE PRACTITIONER

## 2017-03-30 PROCEDURE — 87086 URINE CULTURE/COLONY COUNT: CPT | Performed by: NURSE PRACTITIONER

## 2017-03-30 PROCEDURE — 99396 PREV VISIT EST AGE 40-64: CPT | Performed by: NURSE PRACTITIONER

## 2017-03-30 PROCEDURE — 86803 HEPATITIS C AB TEST: CPT | Performed by: NURSE PRACTITIONER

## 2017-03-30 PROCEDURE — 87186 SC STD MICRODIL/AGAR DIL: CPT | Performed by: NURSE PRACTITIONER

## 2017-03-30 RX ORDER — MECLIZINE HYDROCHLORIDE 25 MG/1
TABLET ORAL
COMMUNITY
Start: 2017-03-27 | End: 2018-05-09

## 2017-03-30 RX ORDER — CIPROFLOXACIN 250 MG/1
250 TABLET, FILM COATED ORAL 2 TIMES DAILY
Qty: 6 TABLET | Refills: 0 | Status: SHIPPED | OUTPATIENT
Start: 2017-03-30 | End: 2018-05-09

## 2017-03-30 NOTE — PROGRESS NOTES
SUBJECTIVE:     CC: Khushi Sawant is an 51 year old woman who presents for preventive health visit.     Physical   Annual:     Getting at least 3 servings of Calcium per day::  Yes    Bi-annual eye exam::  Yes    Dental care twice a year::  NO    Sleep apnea or symptoms of sleep apnea::  None    Diet::  Low salt, Low fat/cholesterol and Carbohydrate counting    Frequency of exercise::  4-5 days/week    Duration of exercise::  45-60 minutes    Taking medications regularly::  Yes    Medication side effects::  None    Patient is here for a complete physical exam. Feels well except for dysuria and frequency for the past few days. No history of UTI. Sexually active with one male partner. No back pain or nausea. Is back exercising and eating a normal diet. Is very happy to be feeling like herself.          Today's PHQ-2 Score:   PHQ-2 ( 1999 Pfizer) 3/30/2017   Q1: Little interest or pleasure in doing things 0   Q2: Feeling down, depressed or hopeless 0   PHQ-2 Score 0   Little interest or pleasure in doing things -   Feeling down, depressed or hopeless -   PHQ-2 Score -       Abuse: Current or Past(Physical, Sexual or Emotional)- No  Do you feel safe in your environment - Yes    Social History   Substance Use Topics     Smoking status: Never Smoker     Smokeless tobacco: Never Used     Alcohol use 0.0 oz/week     0 Standard drinks or equivalent per week     The patient does not drink >3 drinks per day nor >7 drinks per week.    Recent Labs   Lab Test 12/08/15 08/26/13   CHOL  199  173   HDL  95  81   LDL  93  81   TRIG  55  56   CHOLHDLRATIO  2.1   --    NHDL   --   92       Reviewed orders with patient.  Reviewed health maintenance and updated orders accordingly - Yes    Mammo Decision Support:  Patient over age 50, mutual decision to screen reflected in health maintenance.    Pertinent mammograms are reviewed under the imaging tab.  History of abnormal Pap smear: NO - age 30- 65 PAP every 3 years  "recommended    Reviewed and updated as needed this visit by clinical staff  Tobacco  Allergies  Med Hx  Surg Hx  Fam Hx  Soc Hx        Reviewed and updated as needed this visit by Provider            ROS:  C: NEGATIVE for fever, chills, change in weight  I: NEGATIVE for worrisome rashes, moles or lesions  E: NEGATIVE for vision changes or irritation  ENT: NEGATIVE for ear, mouth and throat problems  R: NEGATIVE for significant cough or SOB  B: NEGATIVE for masses, tenderness or discharge  CV: NEGATIVE for chest pain, palpitations or peripheral edema  GI: NEGATIVE for nausea, abdominal pain, heartburn, or change in bowel habits  : NEGATIVE for unusual urinary or vaginal symptoms. No vaginal bleeding.  M: NEGATIVE for significant arthralgias or myalgia  N: NEGATIVE for weakness, dizziness or paresthesias  P: NEGATIVE for changes in mood or affect       OBJECTIVE:     /80 (BP Location: Right arm, Patient Position: Chair, Cuff Size: Adult Regular)  Pulse 82  Temp 98.8  F (37.1  C) (Oral)  Resp 14  Ht 5' 6.5\" (1.689 m)  Wt 141 lb (64 kg)  SpO2 100%  BMI 22.42 kg/m2  EXAM:  GENERAL: healthy, alert and no distress  EYES: Eyes grossly normal to inspection, PERRL and conjunctivae and sclerae normal  HENT: ear canals and TM's normal, nose and mouth without ulcers or lesions  NECK: no adenopathy, no asymmetry, masses, or scars and thyroid normal to palpation  RESP: lungs clear to auscultation - no rales, rhonchi or wheezes  CV: regular rate and rhythm, normal S1 S2, no S3 or S4, no murmur, click or rub, no peripheral edema and peripheral pulses strong  ABDOMEN: soft, nontender, no hepatosplenomegaly, no masses and bowel sounds normal  MS: no gross musculoskeletal defects noted, no edema  SKIN: no suspicious lesions or rashes  NEURO: Normal strength and tone, mentation intact and speech normal  PSYCH: mentation appears normal, affect normal/bright    ASSESSMENT/PLAN:     1. Encounter for routine adult health " "examination without abnormal findings  Normal examination. Will draw hepatitis C screening.   - **Hepatitis C Screen Reflex to RNA FUTURE anytime    2. Dysuria  UA shows a moderate amount of leukocytes. Will treat with ciprofloxacin 250 mg BID. May need a longer course but due to history of C diff will limit use of antibiotics. Encouraged fluids to help flush the kidneys.   - *UA reflex to Microscopic and Culture ([place] and [facility] (except [place] and [place])  - Urine Microscopic  - Urine Culture Aerobic Bacterial  - ciprofloxacin (CIPRO) 250 MG tablet; Take 1 tablet (250 mg) by mouth 2 times daily  Dispense: 6 tablet; Refill: 0    3. Nonspecific finding on examination of urine  Culture is pending.   - Urine Culture Aerobic Bacterial    COUNSELING:  Reviewed preventive health counseling, as reflected in patient instructions       Regular exercise       Healthy diet/nutrition         reports that she has never smoked. She has never used smokeless tobacco.    Estimated body mass index is 22.42 kg/(m^2) as calculated from the following:    Height as of this encounter: 5' 6.5\" (1.689 m).    Weight as of this encounter: 141 lb (64 kg).       Counseling Resources:  ATP IV Guidelines  Pooled Cohorts Equation Calculator  Breast Cancer Risk Calculator  FRAX Risk Assessment  ICSI Preventive Guidelines  Dietary Guidelines for Americans, 2010  USDA's MyPlate  ASA Prophylaxis  Lung CA Screening    Manjula Sargent NP  [facility]  "

## 2017-03-30 NOTE — NURSING NOTE
"Chief Complaint   Patient presents with     Physical     fasting        Initial /80 (BP Location: Right arm, Patient Position: Chair, Cuff Size: Adult Regular)  Pulse 82  Temp 98.8  F (37.1  C) (Oral)  Resp 14  Ht 5' 6.5\" (1.689 m)  Wt 141 lb (64 kg)  SpO2 100%  BMI 22.42 kg/m2 Estimated body mass index is 22.42 kg/(m^2) as calculated from the following:    Height as of this encounter: 5' 6.5\" (1.689 m).    Weight as of this encounter: 141 lb (64 kg).  Medication Reconciliation: complete.Mirza BEACH MA      "

## 2017-03-30 NOTE — MR AVS SNAPSHOT
After Visit Summary   3/30/2017    Khushi Sawant    MRN: [id number]           Patient Information     Date Of Birth          1965        Visit Information        Provider Department      3/30/2017 9:00 AM Manjula Sargent NP [facility]        Today's Diagnoses     Dysuria    -  1    Encounter for routine adult health examination without abnormal findings        Nonspecific finding on examination of urine          Care Instructions      Preventive Health Recommendations  Female Ages 50 - 64    Yearly exam: See your health care provider every year in order to  o Review health changes.   o Discuss preventive care.    o Review your medicines if your doctor has prescribed any.      Get a Pap test every three years (unless you have an abnormal result and your provider advises testing more often).    If you get Pap tests with HPV test, you only need to test every 5 years, unless you have an abnormal result.     You do not need a Pap test if your uterus was removed (hysterectomy) and you have not had cancer.    You should be tested each year for STDs (sexually transmitted diseases) if you're at risk.     Have a mammogram every 1 to 2 years.    Have a colonoscopy at age 50, or have a yearly FIT test (stool test). These exams screen for colon cancer.      Have a cholesterol test every 5 years, or more often if advised.    Have a diabetes test (fasting glucose) every three years. If you are at risk for diabetes, you should have this test more often.     If you are at risk for osteoporosis (brittle bone disease), think about having a bone density scan (DEXA).    Shots: Get a flu shot each year. Get a tetanus shot every 10 years.    Nutrition:     Eat at least 5 servings of fruits and vegetables each day.    Eat whole-grain bread, whole-wheat pasta and brown rice instead of white grains and rice.    Talk to your provider about Calcium and Vitamin D.     Lifestyle    Exercise at least 150  "minutes a week (30 minutes a day, 5 days a week). This will help you control your weight and prevent disease.    Limit alcohol to one drink per day.    No smoking.     Wear sunscreen to prevent skin cancer.     See your dentist every six months for an exam and cleaning.    See your eye doctor every 1 to 2 years.          Follow-ups after your visit        Who to contact     If you have questions or need follow up information about today's clinic visit or your schedule please contact [facility] directly at [phone].  Normal or non-critical lab and imaging results will be communicated to you by [employer], letter or phone within 4 business days after the clinic has received the results. If you do not hear from us within 7 days, please contact the clinic through [employer]quis[employer] or phone. If you have a critical or abnormal lab result, we will notify you by phone as soon as possible.  Submit refill requests through Acquisio or call your pharmacy and they will forward the refill request to us. Please allow 3 business days for your refill to be completed.          Additional Information About Your Visit        Acquisio Information     Acquisio gives you secure access to your electronic health record. If you see a primary care provider, you can also send messages to your care team and make appointments. If you have questions, please call your primary care clinic.  If you do not have a primary care provider, please call [phone] and they will assist you.        Care EveryWhere ID     This is your Care EveryWhere ID. This could be used by other organizations to access your [place] medical records  [id number]        Your Vitals Were     Pulse Temperature Respirations Height Pulse Oximetry BMI (Body Mass Index)    82 98.8  F (37.1  C) (Oral) 14 5' 6.5\" (1.689 m) 100% 22.42 kg/m2       Blood Pressure from Last 3 Encounters:   03/30/17 120/80   02/15/17 110/80   01/12/17 110/80    Weight from Last 3 Encounters: "   03/30/17 141 lb (64 kg)   02/15/17 139 lb 4.8 oz (63.2 kg)   01/12/17 137 lb 6.4 oz (62.3 kg)              We Performed the Following     **Hepatitis C Screen Reflex to RNA FUTURE anytime     *UA reflex to Microscopic and Culture ([facility] and [facility] (except [place] and [place])     Urine Culture Aerobic Bacterial     Urine Microscopic          Today's Medication Changes          These changes are accurate as of: 3/30/17  9:46 AM.  If you have any questions, ask your nurse or doctor.               Start taking these medicines.        Dose/Directions    ciprofloxacin 250 MG tablet   Commonly known as:  CIPRO   Used for:  Dysuria   Started by:  Manjula Sargent NP        Dose:  250 mg   Take 1 tablet (250 mg) by mouth 2 times daily   Quantity:  6 tablet   Refills:  0            Where to get your medicines      These medications were sent to [facility] [id number] [place] [address] [facility] AT [address] [address] [facility] [address]     Phone:  [phone]     ciprofloxacin 250 MG tablet                Primary Care Provider Office Phone # Fax #    Manjula Sargent [phone]       [facility] [address]  [facility] [address]        Thank you!     Thank you for choosing [facility]  for your care. Our goal is always to provide you with excellent care. Hearing back from our patients is one way we can continue to improve our services. Please take a few minutes to complete the written survey that you may receive in the mail after your visit with us. Thank you!             Your Updated Medication List - Protect others around you: Learn how to safely use, store and throw away your medicines at www.disposemymeds.org.          This list is accurate as of: 3/30/17  9:46 AM.  Always use your most recent med list.                   Brand Name Dispense Instructions for use    ciprofloxacin 250 MG tablet    CIPRO    6 tablet    Take  1 tablet (250 mg) by mouth 2 times daily       THYMUS PO          UNABLE TO FIND      MEDICATION NAME: Jesse

## 2017-03-30 NOTE — PATIENT INSTRUCTIONS
"  Preventive Health Recommendations  Female Ages 50 - 64    Yearly exam: See your health care provider every year in order to  o Review health changes.   o Discuss preventive care.    o Review your medicines if your doctor has prescribed any.      Get a Pap test every three years (unless you have an abnormal result and your provider advises testing more often).    If you get Pap tests with HPV test, you only need to test every 5 years, unless you have an abnormal result.     You do not need a Pap test if your uterus was removed (hysterectomy) and you have not had cancer.    You should be tested each year for STDs (sexually transmitted diseases) if you're at risk.     Have a mammogram every 1 to 2 years.    Have a colonoscopy at age 50, or have a yearly FIT test (stool test). These exams screen for colon cancer.      Have a cholesterol test every 5 years, or more often if advised.    Have a diabetes test (fasting glucose) every three years. If you are at risk for diabetes, you should have this test more often.     If you are at risk for osteoporosis (brittle bone disease), think about having a bone density scan (DEXA).    Shots: Get a flu shot each year. Get a tetanus shot every 10 years.    Nutrition:     Eat at least 5 servings of fruits and vegetables each day.    Eat whole-grain bread, whole-wheat pasta and brown rice instead of white grains and rice.    Talk to your provider about Calcium and Vitamin D.     Lifestyle    Exercise at least 150 minutes a week (30 minutes a day, 5 days a week). This will help you control your weight and prevent disease.    Limit alcohol to one drink per day.    No smoking.     Wear sunscreen to prevent skin cancer.     See your dentist every six months for an exam and cleaning.    See your eye doctor every 1 to 2 years.       * BLADDER INFECTION,Female (Adult)    A bladder infection (\"cystitis\" or \"UTI\") usually causes a constant urge to urinate and a burning when passing urine. " Urine may be cloudy, smelly or dark. There may be pain in the lower abdomen. A bladder infection occurs when bacteria from the vaginal area enter the bladder opening (urethra). This can occur from sexual intercourse, wearing tight clothing, dehydration and other factors.  HOME CARE:  1. Drink lots of fluids (at least 6-8 glasses a day, unless you must restrict fluids for other medical reasons). This will force the medicine into your urinary system and flush the bacteria out of your body. Cranberry juice has been shown to help clear out the bacteria.  2. Avoid sexual intercourse until your symptoms are gone.  3. A bladder infection is treated with antibiotics. You may also be given Pyridium (generic = phenazopyridine) to reduce the burning sensation. This medicine will cause your urine to become a bright orange color. The orange urine may stain clothing. You may wear a pad or panty-liner to protect clothing.  PREVENTING FUTURE INFECTIONS:  1. Always wipe from front to back after a bowel movement.  2. Keep the genital area clean and dry.  3. Drink plenty of fluids each day to avoid dehydration.  4. Urinate right after intercourse to flush out the bladder.  5. Wear cotton underwear and cotton-lined panty hose; avoid tight-fitting pants.  6. If you are on birth control pills and are having frequent bladder infections, discuss with your doctor.  FOLLOW UP: Return to this facility or see your doctor if ALL symptoms are not gone after three days of treatment.  GET PROMPT MEDICAL ATTENTION if any of the following occur:    Fever over 101 F (38.3 C)    No improvement by the third day of treatment    Increasing back or abdominal pain    Repeated vomiting; unable to keep medicine down    Weakness, dizziness or fainting    Vaginal discharge    Pain, redness or swelling in the labia (outer vaginal area)    4562-6675 [employer], [address] [place] [address], [place] [address]. All rights reserved. This information is not  intended as a substitute for professional medical care. Always follow your healthcare professional's instructions.

## 2017-03-31 LAB — HCV AB SERPL QL IA: NORMAL

## 2017-04-01 LAB
BACTERIA SPEC CULT: ABNORMAL
MICRO REPORT STATUS: ABNORMAL
MICROORGANISM SPEC CULT: ABNORMAL
SPECIMEN SOURCE: ABNORMAL

## 2017-11-01 ENCOUNTER — TRANSFERRED RECORDS (OUTPATIENT)
Dept: HEALTH INFORMATION MANAGEMENT | Facility: CLINIC | Age: 52
End: 2017-11-01

## 2017-12-04 ENCOUNTER — TRANSFERRED RECORDS (OUTPATIENT)
Dept: HEALTH INFORMATION MANAGEMENT | Facility: CLINIC | Age: 52
End: 2017-12-04

## 2018-01-03 ENCOUNTER — TELEPHONE (OUTPATIENT)
Dept: FAMILY MEDICINE | Facility: CLINIC | Age: 53
End: 2018-01-03

## 2018-05-09 ENCOUNTER — OFFICE VISIT (OUTPATIENT)
Dept: FAMILY MEDICINE | Facility: CLINIC | Age: 53
End: 2018-05-09
Payer: COMMERCIAL

## 2018-05-09 VITALS
BODY MASS INDEX: 23.23 KG/M2 | DIASTOLIC BLOOD PRESSURE: 72 MMHG | HEIGHT: 67 IN | OXYGEN SATURATION: 100 % | HEART RATE: 70 BPM | SYSTOLIC BLOOD PRESSURE: 122 MMHG | WEIGHT: 148 LBS | TEMPERATURE: 98.7 F | RESPIRATION RATE: 16 BRPM

## 2018-05-09 DIAGNOSIS — N95.1 SYMPTOMATIC MENOPAUSAL OR FEMALE CLIMACTERIC STATES: Primary | ICD-10-CM

## 2018-05-09 PROCEDURE — 99213 OFFICE O/P EST LOW 20 MIN: CPT | Performed by: NURSE PRACTITIONER

## 2018-05-09 RX ORDER — ESTRADIOL 0.5 MG/1
0.5 TABLET ORAL DAILY
Qty: 42 TABLET | COMMUNITY
Start: 2018-05-09 | End: 2019-10-18

## 2018-05-09 RX ORDER — VENLAFAXINE HYDROCHLORIDE 75 MG/1
75 CAPSULE, EXTENDED RELEASE ORAL DAILY
Qty: 30 CAPSULE | Refills: 1 | Status: SHIPPED | OUTPATIENT
Start: 2018-05-09 | End: 2018-05-29

## 2018-05-09 NOTE — PROGRESS NOTES
"  SUBJECTIVE:   Khushi Sawant is a 52 year old female who presents to clinic today for the following health issues:    Currently on estradiol for hot flashes. Is struggling with anxiety and fluctuations in mood likely related to menopausal symptoms. Feels frustrated with hot flashes and irritability. Very active working out on a daily basis. Healthy diet.  and sexually active. Originally started on the estradiol for vaginal dryness. Significant improvement with medication. Here to discuss options for better management of symptoms.           Problem list and histories reviewed & adjusted, as indicated.  Additional history: none    Patient Active Problem List   Diagnosis     Vertigo     C. difficile diarrhea     History reviewed. No pertinent surgical history.    Social History   Substance Use Topics     Smoking status: Never Smoker     Smokeless tobacco: Never Used     Alcohol use 0.0 oz/week     0 Standard drinks or equivalent per week     Family History   Problem Relation Age of Onset     Family History Negative Mother      Family History Negative Father            Reviewed and updated as needed this visit by clinical staff  Tobacco  Allergies  Meds  Problems  Med Hx  Surg Hx  Fam Hx  Soc Hx        Reviewed and updated as needed this visit by Provider  Allergies  Meds  Problems  Med Hx  Surg Hx  Fam Hx         ROS:  Constitutional, HEENT, cardiovascular, pulmonary, gi and gu systems are negative, except as otherwise noted.    OBJECTIVE:     /72 (BP Location: Right arm, Patient Position: Chair, Cuff Size: Adult Regular)  Pulse 70  Temp 98.7  F (37.1  C) (Oral)  Resp 16  Ht 5' 6.5\" (1.689 m)  Wt 148 lb (67.1 kg)  SpO2 100%  Breastfeeding? No  BMI 23.53 kg/m2  Body mass index is 23.53 kg/(m^2).  GENERAL: healthy, alert and no distress  RESP: lungs clear to auscultation - no rales, rhonchi or wheezes  CV: regular rate and rhythm, normal S1 S2, no S3 or S4, no murmur, click or " rub, no peripheral edema and peripheral pulses strong  PSYCH: mentation appears normal, affect normal/bright        ASSESSMENT/PLAN:             1. Symptomatic menopausal or female climacteric states  Will start on effexor for menopausal symptoms. Continue with estradiol as well as previously prescribed. Effexor may help with anxiety.   - venlafaxine (EFFEXOR-XR) 75 MG 24 hr capsule; Take 1 capsule (75 mg) by mouth daily  Dispense: 30 capsule; Refill: 1    Follow up either via telephone visit or in clinic in one month.     Manjula Sargent, NP  [facility]

## 2018-05-09 NOTE — MR AVS SNAPSHOT
"              After Visit Summary   5/9/2018    Khushi Sawant    MRN: [id number]           Patient Information     Date Of Birth          1965        Visit Information        Provider Department      5/9/2018 1:30 PM Manjula Sargent NP [facility]        Today's Diagnoses     Symptomatic menopausal or female climacteric states    -  1       Follow-ups after your visit        Follow-up notes from your care team     Return in about 1 year (around 5/9/2019) for Physical Exam.      Who to contact     If you have questions or need follow up information about today's clinic visit or your schedule please contact [facility] directly at [phone].  Normal or non-critical lab and imaging results will be communicated to you by MyChart, letter or phone within 4 business days after the clinic has received the results. If you do not hear from us within 7 days, please contact the clinic through [employer] or phone. If you have a critical or abnormal lab result, we will notify you by phone as soon as possible.  Submit refill requests through [email or web address] or call your pharmacy and they will forward the refill request to us. Please allow 3 business days for your refill to be completed.          Additional Information About Your Visit        MyChart Information     [email or web address] gives you secure access to your electronic health record. If you see a primary care provider, you can also send messages to your care team and make appointments. If you have questions, please call your primary care clinic.  If you do not have a primary care provider, please call [phone] and they will assist you.        Care EveryWhere ID     This is your Care EveryWhere ID. This could be used by other organizations to access your [place] medical records  [id number]        Your Vitals Were     Pulse Temperature Respirations Height Pulse Oximetry Breastfeeding?    70 98.7  F (37.1  C) (Oral) 16 5' 6.5\" (1.689 m) 100% No    " BMI (Body Mass Index)                   23.53 kg/m2            Blood Pressure from Last 3 Encounters:   05/09/18 122/72   03/30/17 120/80   02/15/17 110/80    Weight from Last 3 Encounters:   05/09/18 148 lb (67.1 kg)   03/30/17 141 lb (64 kg)   02/15/17 139 lb 4.8 oz (63.2 kg)              Today, you had the following     No orders found for display         Today's Medication Changes          These changes are accurate as of 5/9/18  2:10 PM.  If you have any questions, ask your nurse or doctor.               Start taking these medicines.        Dose/Directions    venlafaxine 75 MG 24 hr capsule   Commonly known as:  EFFEXOR-XR   Used for:  Symptomatic menopausal or female climacteric states   Started by:  Manjula Sargent NP        Dose:  75 mg   Take 1 capsule (75 mg) by mouth daily   Quantity:  30 capsule   Refills:  1            Where to get your medicines      These medications were sent to [facility] [address] [place] [address] AT [address] [place] [address]     Phone:  [phone]     venlafaxine 75 MG 24 hr capsule                Primary Care Provider Office Phone # Fax #    Manjula Sargent [phone]       [facility] [address] [address] [facility] [address]        Equal Access to Services     ALYCE HOLM AH: Hadii aad ku hadasho Soomaali, waaxda luqadaha, qaybta kaalmada adeegyada, eric varghese hayap valencia . So [facility] [phone].    ATENCIÓN: Si habla español, tiene a henderson disposición servicios gratuitos de asistencia lingüística. Llame al [phone].    We comply with applicable federal civil rights laws and [place] laws. We do not discriminate on the basis of race, color, national origin, age, disability, sex, sexual orientation, or gender identity.            Thank you!     Thank you for choosing [facility]  for your care. Our goal is always to provide you with excellent care. Hearing  back from our patients is one way we can continue to improve our services. Please take a few minutes to complete the written survey that you may receive in the mail after your visit with us. Thank you!             Your Updated Medication List - Protect others around you: Learn how to safely use, store and throw away your medicines at www.disposemymeds.org.          This list is accurate as of 5/9/18  2:10 PM.  Always use your most recent med list.                   Brand Name Dispense Instructions for use Diagnosis    estradiol 0.5 MG tablet    ESTRACE    42 tablet    Take 1 tablet (0.5 mg) by mouth daily        venlafaxine 75 MG 24 hr capsule    EFFEXOR-XR    30 capsule    Take 1 capsule (75 mg) by mouth daily    Symptomatic menopausal or female climacteric states

## 2018-05-09 NOTE — NURSING NOTE
"tdap on 5/9/2018.Rigo Ann CMA, also ANURADHA from [facility] for mammo don on 11/1/2017.Rigo Ann CMA    Chief Complaint   Patient presents with     Menopausal Sx       Initial /72 (BP Location: Right arm, Patient Position: Chair, Cuff Size: Adult Regular)  Pulse 70  Temp 98.7  F (37.1  C) (Oral)  Resp 16  Ht 5' 6.5\" (1.689 m)  Wt 148 lb (67.1 kg)  SpO2 100%  Breastfeeding? No  BMI 23.53 kg/m2 Estimated body mass index is 23.53 kg/(m^2) as calculated from the following:    Height as of this encounter: 5' 6.5\" (1.689 m).    Weight as of this encounter: 148 lb (67.1 kg).  Medication Reconciliation: complete      Health Maintenance addressed:  ANURADHA done    Rigo Ann CMA  .        "

## 2018-05-09 NOTE — Clinical Note
Please abstract the following data from this visit with this patient into the appropriate field in Epic:  Mammogram at [facility], it was normal as of 11/1/2017

## 2018-05-11 ENCOUNTER — TELEPHONE (OUTPATIENT)
Dept: FAMILY MEDICINE | Facility: CLINIC | Age: 53
End: 2018-05-11

## 2018-05-11 ENCOUNTER — OFFICE VISIT (OUTPATIENT)
Dept: PEDIATRICS | Facility: CLINIC | Age: 53
End: 2018-05-11
Payer: COMMERCIAL

## 2018-05-11 ENCOUNTER — TELEPHONE (OUTPATIENT)
Dept: PEDIATRICS | Facility: CLINIC | Age: 53
End: 2018-05-11

## 2018-05-11 VITALS
BODY MASS INDEX: 23.15 KG/M2 | HEIGHT: 67 IN | WEIGHT: 147.5 LBS | DIASTOLIC BLOOD PRESSURE: 76 MMHG | OXYGEN SATURATION: 99 % | TEMPERATURE: 97.7 F | HEART RATE: 71 BPM | SYSTOLIC BLOOD PRESSURE: 126 MMHG

## 2018-05-11 DIAGNOSIS — F41.9 ANXIETY: Primary | ICD-10-CM

## 2018-05-11 PROCEDURE — 99214 OFFICE O/P EST MOD 30 MIN: CPT | Performed by: PHYSICIAN ASSISTANT

## 2018-05-11 RX ORDER — LORAZEPAM 0.5 MG/1
.25-.5 TABLET ORAL EVERY 8 HOURS PRN
Qty: 15 TABLET | Refills: 0 | Status: SHIPPED | OUTPATIENT
Start: 2018-05-11 | End: 2019-10-18

## 2018-05-11 NOTE — PROGRESS NOTES
"  SUBJECTIVE:   Khushi Sawant is a 52 year old female, accompanied by , who presents to clinic today for the following health issues:    Patient presents today s/p panic attack--episode of racing heart, tightness in chest, sob, tearful.     She describes increased anxiety, insomnia, tearful episodes with hormone changes. She was started on estradiol with improvement in symptoms.    Two days ago, symptoms worsened and patient saw PMD who placed her on effexor. After taking effexor, patient felt more anxious and \"wired.\" this morning she repeated dose and felt panic symptoms almost immediately.     No situational stressors.     ROS:  ROS otherwise negative    OBJECTIVE:                                                    /76 (BP Location: Right arm, Cuff Size: Adult Regular)  Pulse 71  Temp 97.7  F (36.5  C) (Oral)  Ht 5' 6.5\" (1.689 m)  Wt 147 lb 8 oz (66.9 kg)  SpO2 99%  BMI 23.45 kg/m2  Body mass index is 23.45 kg/(m^2).   GENERAL: tearful, anxious  Eyes:  Eyes grossly normal to inspection, PERRL and conjunctivae and sclerae normal  HENT: Mouth- no ulcers, no lesions  NECK: no tenderness, no adenopathy  RESP: lungs clear to auscultation - no rales, no rhonchi, no wheezes  CV: regular rates and rhythm, normal S1 S2, no S3 or S4 and no murmur, no click or rub    Diagnostic test results:  No results found for this or any previous visit (from the past 24 hour(s)).     ASSESSMENT/PLAN:                                                    1. Anxiety  Long discussion with patient and . Discontinue effexor as this increased anxiety. Discuss further options with PMD next week.   In the meantime, short acting anxiety medication prescribed. Patient to use PRN and not to take with alcohol.   She will also discuss different options for estrogen with GYN.   She has no further questions or concerns today.   - LORazepam (ATIVAN) 0.5 MG tablet; Take 0.5-1 tablets (0.25-0.5 mg) by mouth every 8 hours as " needed for anxiety  Dispense: 15 tablet; Refill: 0    See Patient Instructions    Greater than 25 minutes was spent with patient today with the majority spent on counseling and coordination of care for the conditions listed above.     Bennie Wilson PA-C  [facility]

## 2018-05-11 NOTE — PATIENT INSTRUCTIONS
"               Generalized Anxiety Disorder  What is generalized anxiety disorder?   Generalized anxiety disorder (ROSA) is a condition in which a person worries excessively and unrealistically. They may also be jittery, restless, or dizzy. When these symptoms last for at least 6 months, a diagnosis of ROSA may be made.  ROSA may exist by itself, or with both anxiety and depression. It is estimated that almost 5% of people have had this disorder during their lives.  How does it occur?   The cause of ROSA is unknown. Genetic and environmental factors play a role. Women have ROSA about twice as often as men.  The worry in ROSA is not about panic attacks or being afraid in public places. It is typically \"free-floating\" anxiety out of proportion to any real life situation. The worrying can interfere with normal day-to-day activities and work or school.  What are the symptoms?   Symptoms include excessive, unrealistic, and uncontrollable worrying about many things such as:  the state of the world   the economy   violence in society   your job   the bills   chores   family members  Physical symptoms such as muscle tension, sleep problems, or feeling on edge usually go along with anxiety. A person may be short-tempered and unable to focus or concentrate because of the worrying. Other symptoms include sweating, shaking, having a very fast heartbeat, feeling out of breath, needing to go to the bathroom often and feeling like fainting. People with ROSA may be uneasy in a group or in a waiting room.  How is it diagnosed?   There is no lab test for ROSA. Your healthcare provider or therapist will ask about your symptoms. He or she will make sure you do not have a medical illness or drug or alcohol problem that could cause the symptoms. Some medicines can cause anxiety or make it worse. These include asthma medicines, stimulants, and steroids such as prednisone.  If you have had the symptoms for at least 6 months, if you have had to cut " back on your activities, and if you find it difficult to get things done, you may be diagnosed with generalized anxiety disorder.  How is it treated?   Different types of approaches have proven helpful in treating ROSA. These include medicine, behavior therapy, relaxation therapy, cognitive therapy, and stress management techniques. Which treatments your healthcare provider or therapist uses may depend upon how much the disorder interferes with your day-to-day life.  Several types of medicines can help treat ROSA. Your healthcare provider will work with you to carefully select the best one for you.  How long will the effects last?   ROSA can last many years and sometimes an entire lifetime.   How can I take care of myself?   Get support. Talk with family and friends. Consider joining a support group in your area. Go to a stress management class in your local community.   Learn to manage stress. Ask for help at home and work when the load is too great to handle. Find ways to relax, for example take up a hobby, listen to music, watch movies, take walks. Try deep breathing exercises when you feel stressed.   Take care of your physical health. Try to get at least 7 to 9 hours of sleep each night. Eat a healthy diet. Limit caffeine. If you smoke, quit. Avoid alcohol and drugs, because they can make your symptoms worse. Exercise according to your healthcare provider's instructions.   Check your medicines. To help prevent problems, tell your healthcare provider and pharmacist about all the medicines, natural remedies, vitamins, and other supplements that you take.   Contact your healthcare provider or therapist if you have any questions or your symptoms seem to be getting worse.  You may also want to contact [facility] (formerly the National Mental Health Association or NMHA). [facility]'s toll-free Information Center number is 2-035-289-[facility]. Its web site address is http://www.NMHA.org

## 2018-05-11 NOTE — TELEPHONE ENCOUNTER
Pt is coming in to see our SDP for anxiety f/u. Called pt to get clarification.     Pt was seen y  on 05/09 for anxiety, started on effexor XR 75 mg qd. She took one capsule on Wed & one yesterday. Since taking the med, has been experiencing racing heart, palpitations, lethargy, dizziness. Denies chest pain, insomnia, HA, suicidal thoughts, diaphoresis or GI sx's.    She called & consulted with 's triage nurse regarding her sx's. Pt was advised to be seen by SDP in [place] location.     Notified SDP as above.     Kelsi, RN  Triage Nurse

## 2018-05-11 NOTE — MR AVS SNAPSHOT
"              After Visit Summary   5/11/2018    Khushi Sawant    MRN: [id number]           Patient Information     Date Of Birth          1965        Visit Information        Provider Department      5/11/2018 12:50 PM Bennie Wilson PA-C [facility] [place]        Today's Diagnoses     Anxiety    -  1      Care Instructions                   Generalized Anxiety Disorder  What is generalized anxiety disorder?   Generalized anxiety disorder (ROSA) is a condition in which a person worries excessively and unrealistically. They may also be jittery, restless, or dizzy. When these symptoms last for at least 6 months, a diagnosis of ROSA may be made.  ROSA may exist by itself, or with both anxiety and depression. It is estimated that almost 5% of people have had this disorder during their lives.  How does it occur?   The cause of ROSA is unknown. Genetic and environmental factors play a role. Women have ROSA about twice as often as men.  The worry in ROSA is not about panic attacks or being afraid in public places. It is typically \"free-floating\" anxiety out of proportion to any real life situation. The worrying can interfere with normal day-to-day activities and work or school.  What are the symptoms?   Symptoms include excessive, unrealistic, and uncontrollable worrying about many things such as:  the state of the world   the economy   violence in society   your job   the bills   chores   family members  Physical symptoms such as muscle tension, sleep problems, or feeling on edge usually go along with anxiety. A person may be short-tempered and unable to focus or concentrate because of the worrying. Other symptoms include sweating, shaking, having a very fast heartbeat, feeling out of breath, needing to go to the bathroom often and feeling like fainting. People with ROSA may be uneasy in a group or in a waiting room.  How is it diagnosed?   There is no lab test for ROSA. Your healthcare provider or " therapist will ask about your symptoms. He or she will make sure you do not have a medical illness or drug or alcohol problem that could cause the symptoms. Some medicines can cause anxiety or make it worse. These include asthma medicines, stimulants, and steroids such as prednisone.  If you have had the symptoms for at least 6 months, if you have had to cut back on your activities, and if you find it difficult to get things done, you may be diagnosed with generalized anxiety disorder.  How is it treated?   Different types of approaches have proven helpful in treating ROSA. These include medicine, behavior therapy, relaxation therapy, cognitive therapy, and stress management techniques. Which treatments your healthcare provider or therapist uses may depend upon how much the disorder interferes with your day-to-day life.  Several types of medicines can help treat ROSA. Your healthcare provider will work with you to carefully select the best one for you.  How long will the effects last?   ROSA can last many years and sometimes an entire lifetime.   How can I take care of myself?   Get support. Talk with family and friends. Consider joining a support group in your area. Go to a stress management class in your local community.   Learn to manage stress. Ask for help at home and work when the load is too great to handle. Find ways to relax, for example take up a hobby, listen to music, watch movies, take walks. Try deep breathing exercises when you feel stressed.   Take care of your physical health. Try to get at least 7 to 9 hours of sleep each night. Eat a healthy diet. Limit caffeine. If you smoke, quit. Avoid alcohol and drugs, because they can make your symptoms worse. Exercise according to your healthcare provider's instructions.   Check your medicines. To help prevent problems, tell your healthcare provider and pharmacist about all the medicines, natural remedies, vitamins, and other supplements that you take.  "  Contact your healthcare provider or therapist if you have any questions or your symptoms seem to be getting worse.  You may also want to contact [facility] (formerly the National Mental Health Association or [employer]). [facility]'s toll-free Information Center number is 7-805-677-[facility]. Its web site address is http://www.RUST.org              Follow-ups after your visit        Who to contact     If you have questions or need follow up information about today's clinic visit or your schedule please contact [facility] directly at [phone].  Normal or non-critical lab and imaging results will be communicated to you by FitOrbithart, letter or phone within 4 business days after the clinic has received the results. If you do not hear from us within 7 days, please contact the clinic through FitOrbithart or phone. If you have a critical or abnormal lab result, we will notify you by phone as soon as possible.  Submit refill requests through Shaser or call your pharmacy and they will forward the refill request to us. Please allow 3 business days for your refill to be completed.          Additional Information About Your Visit        MyChart Information     Shaser gives you secure access to your electronic health record. If you see a primary care provider, you can also send messages to your care team and make appointments. If you have questions, please call your primary care clinic.  If you do not have a primary care provider, please call [phone] and they will assist you.        Care EveryWhere ID     This is your Care EveryWhere ID. This could be used by other organizations to access your [place] medical records  [id number]        Your Vitals Were     Pulse Temperature Height Pulse Oximetry BMI (Body Mass Index)       71 97.7  F (36.5  C) (Oral) 5' 6.5\" (1.689 m) 99% 23.45 kg/m2        Blood Pressure from Last 3 Encounters:   05/11/18 126/76   05/09/18 122/72   03/30/17 120/80    Weight from Last 3 " Encounters:   05/11/18 147 lb 8 oz (66.9 kg)   05/09/18 148 lb (67.1 kg)   03/30/17 141 lb (64 kg)              Today, you had the following     No orders found for display         Today's Medication Changes          These changes are accurate as of 5/11/18  1:20 PM.  If you have any questions, ask your nurse or doctor.               Start taking these medicines.        Dose/Directions    LORazepam 0.5 MG tablet   Commonly known as:  ATIVAN   Used for:  Anxiety   Started by:  Bennie Wilson PA-C        Dose:  0.25-0.5 mg   Take 0.5-1 tablets (0.25-0.5 mg) by mouth every 8 hours as needed for anxiety   Quantity:  15 tablet   Refills:  0            Where to get your medicines      Some of these will need a paper prescription and others can be bought over the counter.  Ask your nurse if you have questions.     Bring a paper prescription for each of these medications     LORazepam 0.5 MG tablet                Primary Care Provider Office Phone # Fax #    Manjula Sargent [phone]       [facility] ([place] [id number] [place] [id number]        Equal Access to Services     HUMBERTO HOLM AH: Hadii dawit crawford hadasho Soomaali, waaxda luqadaha, qaybta kaalmada adeegyada, eric loza. So [facility] [phone].    ATENCIÓN: Si habla español, tiene a henderson disposición servicios gratuitos de asistencia lingüística. Llame al [phone].    We comply with applicable federal civil rights laws and [place] laws. We do not discriminate on the basis of race, color, national origin, age, disability, sex, sexual orientation, or gender identity.            Thank you!     Thank you for choosing [facility]  for your care. Our goal is always to provide you with excellent care. Hearing back from our patients is one way we can continue to improve our services. Please take a few minutes to complete the written survey that you may receive in the mail after your visit with  us. Thank you!             Your Updated Medication List - Protect others around you: Learn how to safely use, store and throw away your medicines at www.disposemymeds.org.          This list is accurate as of 5/11/18  1:20 PM.  Always use your most recent med list.                   Brand Name Dispense Instructions for use Diagnosis    estradiol 0.5 MG tablet    ESTRACE    42 tablet    Take 1 tablet (0.5 mg) by mouth daily        LORazepam 0.5 MG tablet    ATIVAN    15 tablet    Take 0.5-1 tablets (0.25-0.5 mg) by mouth every 8 hours as needed for anxiety    Anxiety       venlafaxine 75 MG 24 hr capsule    EFFEXOR-XR    30 capsule    Take 1 capsule (75 mg) by mouth daily    Symptomatic menopausal or female climacteric states

## 2018-05-29 ENCOUNTER — OFFICE VISIT (OUTPATIENT)
Dept: FAMILY MEDICINE | Facility: CLINIC | Age: 53
End: 2018-05-29
Payer: COMMERCIAL

## 2018-05-29 VITALS
RESPIRATION RATE: 16 BRPM | DIASTOLIC BLOOD PRESSURE: 74 MMHG | HEART RATE: 65 BPM | TEMPERATURE: 98.9 F | HEIGHT: 67 IN | OXYGEN SATURATION: 99 % | SYSTOLIC BLOOD PRESSURE: 118 MMHG | WEIGHT: 146 LBS | BODY MASS INDEX: 22.91 KG/M2

## 2018-05-29 DIAGNOSIS — R00.2 PALPITATIONS: Primary | ICD-10-CM

## 2018-05-29 PROCEDURE — 99213 OFFICE O/P EST LOW 20 MIN: CPT | Performed by: NURSE PRACTITIONER

## 2018-05-29 NOTE — MR AVS SNAPSHOT
After Visit Summary   5/29/2018    Khushi Sawant    MRN: [id number]           Patient Information     Date Of Birth          1965        Visit Information        Provider Department      5/29/2018 10:45 AM Manjula Sargent NP [facility]        Today's Diagnoses     Palpitations    -  1       Follow-ups after your visit        Follow-up notes from your care team     Return in about 2 weeks (around 6/12/2018).      Future tests that were ordered for you today     Open Future Orders        Priority Expected Expires Ordered    Zio Patch 48 Hours Routine  7/13/2018 5/29/2018            Who to contact     If you have questions or need follow up information about today's clinic visit or your schedule please contact [facility] directly at [phone].  Normal or non-critical lab and imaging results will be communicated to you by MyChart, letter or phone within 4 business days after the clinic has received the results. If you do not hear from us within 7 days, please contact the clinic through MyChart or phone. If you have a critical or abnormal lab result, we will notify you by phone as soon as possible.  Submit refill requests through GoPlanit or call your pharmacy and they will forward the refill request to us. Please allow 3 business days for your refill to be completed.          Additional Information About Your Visit        MyChart Information     GoPlanit gives you secure access to your electronic health record. If you see a primary care provider, you can also send messages to your care team and make appointments. If you have questions, please call your primary care clinic.  If you do not have a primary care provider, please call [phone] and they will assist you.        Care EveryWhere ID     This is your Care EveryWhere ID. This could be used by other organizations to access your [place] medical records  [id number]        Your Vitals Were     Pulse  "Temperature Respirations Height Pulse Oximetry BMI (Body Mass Index)    65 98.9  F (37.2  C) (Oral) 16 5' 6.5\" (1.689 m) 99% 23.21 kg/m2       Blood Pressure from Last 3 Encounters:   05/29/18 118/74   05/11/18 126/76   05/09/18 122/72    Weight from Last 3 Encounters:   05/29/18 146 lb (66.2 kg)   05/11/18 147 lb 8 oz (66.9 kg)   05/09/18 148 lb (67.1 kg)               Primary Care Provider Office Phone # Fax #    Manjula AVALOS NELY Sargent [phone]       [facility] [address] [address] [facility] [address]        Equal Access to Services     ALYCE HOLM : Cristal calvoo Sojaneen, waaxda luqadaha, qaybta kaalmada adeegyada, eric valencia . So [facility] [phone].    ATENCIÓN: Si habla español, tiene a henderson disposición servicios gratuitos de asistencia lingüística. Llame al [phone].    We comply with applicable federal civil rights laws and [place] laws. We do not discriminate on the basis of race, color, national origin, age, disability, sex, sexual orientation, or gender identity.            Thank you!     Thank you for choosing [facility]  for your care. Our goal is always to provide you with excellent care. Hearing back from our patients is one way we can continue to improve our services. Please take a few minutes to complete the written survey that you may receive in the mail after your visit with us. Thank you!             Your Updated Medication List - Protect others around you: Learn how to safely use, store and throw away your medicines at www.disposemymeds.org.          This list is accurate as of 5/29/18 11:17 AM.  Always use your most recent med list.                   Brand Name Dispense Instructions for use Diagnosis    estradiol 0.5 MG tablet    ESTRACE    42 tablet    Take 1 tablet (0.5 mg) by mouth daily        LORazepam 0.5 MG tablet    ATIVAN    15 tablet    Take 0.5-1 tablets (0.25-0.5 mg) by mouth every 8 hours as needed for " anxiety    Anxiety

## 2018-05-29 NOTE — PROGRESS NOTES
"  SUBJECTIVE:   Khushi Sawant is a 52 year old female who presents to clinic today for the following health issues:    Recently started on Effexor for  Menopausal symptoms and had a negative response so medication was stopped. Restarted on her estradiol patch which has been very helpful. Continues to have some anxiety but has since resolved. Now having some palpitations at rest. No caffeine. Very active working out almost daily for one hour. Concerned about atrial fibrillation or simple palpitations or if related to estrogen or menopause.            Problem list and histories reviewed & adjusted, as indicated.  Additional history: none    Patient Active Problem List   Diagnosis     Vertigo     C. difficile diarrhea     History reviewed. No pertinent surgical history.    Social History   Substance Use Topics     Smoking status: Never Smoker     Smokeless tobacco: Never Used     Alcohol use 0.0 oz/week     0 Standard drinks or equivalent per week     Family History   Problem Relation Age of Onset     Family History Negative Mother      Family History Negative Father            Reviewed and updated as needed this visit by clinical staff  Tobacco  Allergies  Meds  Problems  Med Hx  Surg Hx  Fam Hx  Soc Hx        Reviewed and updated as needed this visit by Provider  Allergies  Meds  Problems  Med Hx  Surg Hx  Fam Hx         ROS:  Constitutional, HEENT, cardiovascular, pulmonary, gi and gu systems are negative, except as otherwise noted.    OBJECTIVE:     /74 (BP Location: Right arm, Patient Position: Chair, Cuff Size: Adult Regular)  Pulse 65  Temp 98.9  F (37.2  C) (Oral)  Resp 16  Ht 5' 6.5\" (1.689 m)  Wt 146 lb (66.2 kg)  SpO2 99%  BMI 23.21 kg/m2  Body mass index is 23.21 kg/(m^2).  GENERAL: healthy, alert and no distress  RESP: lungs clear to auscultation - no rales, rhonchi or wheezes  CV: regular rate and rhythm, normal S1 S2, no S3 or S4, no murmur, click or rub, no peripheral " edema and peripheral pulses strong  PSYCH: mentation appears normal, affect normal/bright        ASSESSMENT/PLAN:             1. Palpitations  Will order a Zio patch to be used for 48 hours. Informed that she may still need a referral to cardiology if abnormal results. Patient is in agreement.   - Zio Patch 48 Hours; Future        Manjula Sargent NP  [facility]

## 2018-05-29 NOTE — NURSING NOTE
"Pended a tdap on 5/29/2018.Rigo Ann CMA    Chief Complaint   Patient presents with     RECHECK       Initial /74 (BP Location: Right arm, Patient Position: Chair, Cuff Size: Adult Regular)  Pulse 65  Temp 98.9  F (37.2  C) (Oral)  Resp 16  Ht 5' 6.5\" (1.689 m)  Wt 146 lb (66.2 kg)  SpO2 99%  BMI 23.21 kg/m2 Estimated body mass index is 23.21 kg/(m^2) as calculated from the following:    Height as of this encounter: 5' 6.5\" (1.689 m).    Weight as of this encounter: 146 lb (66.2 kg).  Medication Reconciliation: complete      Health Maintenance addressed:  NONE    Rigo Ann CMA  .      "

## 2018-05-31 ENCOUNTER — HOSPITAL ENCOUNTER (OUTPATIENT)
Dept: CARDIOLOGY | Facility: CLINIC | Age: 53
Discharge: HOME | End: 2018-05-31
Attending: NURSE PRACTITIONER | Admitting: NURSE PRACTITIONER
Payer: COMMERCIAL

## 2018-05-31 DIAGNOSIS — R00.2 PALPITATIONS: ICD-10-CM

## 2018-05-31 PROCEDURE — 0296T ZIO PATCH HOLTER: CPT

## 2018-05-31 PROCEDURE — 0298T ZZC EXT ECG > 48HR TO 21 DAY REVIEW AND INTERPRETATN: CPT | Performed by: INTERNAL MEDICINE

## 2018-06-28 ENCOUNTER — MYC MEDICAL ADVICE (OUTPATIENT)
Dept: FAMILY MEDICINE | Facility: CLINIC | Age: 53
End: 2018-06-28

## 2018-07-02 NOTE — TELEPHONE ENCOUNTER
All completely normal and would be better to just try to ignore which I understand may be difficult. Some people are just more aware of their body. Likely  they will persist and have been there for a long time. All of us have them but may be more frequent with anxiety, stress, dehydration, caffeine.

## 2018-07-17 PROBLEM — F41.9 ANXIETY: Status: ACTIVE | Noted: 2018-07-17

## 2018-12-15 ENCOUNTER — TRANSFERRED RECORDS (OUTPATIENT)
Dept: HEALTH INFORMATION MANAGEMENT | Facility: CLINIC | Age: 53
End: 2018-12-15

## 2018-12-15 LAB — PAP SMEAR - HIM PATIENT REPORTED: NEGATIVE

## 2019-02-15 ENCOUNTER — HEALTH MAINTENANCE LETTER (OUTPATIENT)
Age: 54
End: 2019-02-15

## 2019-08-28 ENCOUNTER — OFFICE VISIT (OUTPATIENT)
Dept: URGENT CARE | Facility: URGENT CARE | Age: 54
End: 2019-08-28
Payer: COMMERCIAL

## 2019-08-28 VITALS
DIASTOLIC BLOOD PRESSURE: 72 MMHG | HEART RATE: 72 BPM | TEMPERATURE: 98.3 F | OXYGEN SATURATION: 98 % | BODY MASS INDEX: 24.17 KG/M2 | RESPIRATION RATE: 20 BRPM | SYSTOLIC BLOOD PRESSURE: 130 MMHG | WEIGHT: 152 LBS

## 2019-08-28 DIAGNOSIS — R30.0 DYSURIA: Primary | ICD-10-CM

## 2019-08-28 LAB
ALBUMIN UR-MCNC: 30 MG/DL
APPEARANCE UR: ABNORMAL
BACTERIA #/AREA URNS HPF: ABNORMAL /HPF
BILIRUB UR QL STRIP: NEGATIVE
COLOR UR AUTO: YELLOW
GLUCOSE UR STRIP-MCNC: NEGATIVE MG/DL
HGB UR QL STRIP: ABNORMAL
KETONES UR STRIP-MCNC: NEGATIVE MG/DL
LEUKOCYTE ESTERASE UR QL STRIP: ABNORMAL
MUCOUS THREADS #/AREA URNS LPF: PRESENT /LPF
NITRATE UR QL: NEGATIVE
NON-SQ EPI CELLS #/AREA URNS LPF: ABNORMAL /LPF
PH UR STRIP: 6 PH (ref 5–7)
RBC #/AREA URNS AUTO: ABNORMAL /HPF
SOURCE: ABNORMAL
SP GR UR STRIP: 1.02 (ref 1–1.03)
SPECIMEN SOURCE: NORMAL
UROBILINOGEN UR STRIP-ACNC: 0.2 EU/DL (ref 0.2–1)
WBC #/AREA URNS AUTO: >100 /HPF
WET PREP SPEC: NORMAL

## 2019-08-28 PROCEDURE — 81001 URINALYSIS AUTO W/SCOPE: CPT | Performed by: FAMILY MEDICINE

## 2019-08-28 PROCEDURE — 87086 URINE CULTURE/COLONY COUNT: CPT | Performed by: FAMILY MEDICINE

## 2019-08-28 PROCEDURE — 99213 OFFICE O/P EST LOW 20 MIN: CPT | Performed by: FAMILY MEDICINE

## 2019-08-28 PROCEDURE — 87210 SMEAR WET MOUNT SALINE/INK: CPT | Performed by: FAMILY MEDICINE

## 2019-08-28 RX ORDER — CIPROFLOXACIN 250 MG/1
250 TABLET, FILM COATED ORAL 2 TIMES DAILY
Qty: 6 TABLET | Refills: 0 | Status: SHIPPED | OUTPATIENT
Start: 2019-08-28 | End: 2019-10-18

## 2019-08-28 NOTE — PROGRESS NOTES
SUBJECTIVE: Khushi Sawant is a 54 year old female who complains of urinary frequency, urgency and dysuria x several days, without flank pain, fever, chills, or abnormal vaginal discharge or bleeding.     OBJECTIVE: Appears well, in no apparent distress.  Vital signs are normal. The abdomen is soft without tenderness, guarding, mass, rebound or organomegaly.   Results for orders placed or performed in visit on 08/28/19   UA reflex to Microscopic and Culture   Result Value Ref Range    Color Urine Yellow     Appearance Urine Cloudy     Glucose Urine Negative NEG^Negative mg/dL    Bilirubin Urine Negative NEG^Negative    Ketones Urine Negative NEG^Negative mg/dL    Specific Gravity Urine 1.020 1.003 - 1.035    Blood Urine Moderate (A) NEG^Negative    pH Urine 6.0 5.0 - 7.0 pH    Protein Albumin Urine 30 (A) NEG^Negative mg/dL    Urobilinogen Urine 0.2 0.2 - 1.0 EU/dL    Nitrite Urine Negative NEG^Negative    Leukocyte Esterase Urine Large (A) NEG^Negative    Source Midstream Urine    Urine Microscopic   Result Value Ref Range    WBC Urine >100 (A) OTO5^0 - 5 /HPF    RBC Urine 10-25 (A) OTO2^O - 2 /HPF    Squamous Epithelial /LPF Urine Few FEW^Few /LPF    Bacteria Urine Moderate (A) NEG^Negative /HPF    Mucous Urine Present (A) NEG^Negative /LPF         ASSESSMENT: UTI uncomplicated without evidence of pyelonephritis    PLAN: Treatment per orders - also push fluids, may use Pyridium OTC prn. Call or return to clinic prn if these symptoms worsen or fail to improve as anticipated.

## 2019-08-29 LAB
BACTERIA SPEC CULT: NORMAL
SPECIMEN SOURCE: NORMAL

## 2019-10-02 ENCOUNTER — HEALTH MAINTENANCE LETTER (OUTPATIENT)
Age: 54
End: 2019-10-02

## 2019-10-17 ENCOUNTER — NURSE TRIAGE (OUTPATIENT)
Dept: NURSING | Facility: CLINIC | Age: 54
End: 2019-10-17

## 2019-10-18 ENCOUNTER — TELEPHONE (OUTPATIENT)
Dept: FAMILY MEDICINE | Facility: CLINIC | Age: 54
End: 2019-10-18

## 2019-10-18 ENCOUNTER — OFFICE VISIT (OUTPATIENT)
Dept: FAMILY MEDICINE | Facility: CLINIC | Age: 54
End: 2019-10-18
Payer: COMMERCIAL

## 2019-10-18 VITALS
TEMPERATURE: 98.5 F | SYSTOLIC BLOOD PRESSURE: 122 MMHG | BODY MASS INDEX: 25.12 KG/M2 | WEIGHT: 158 LBS | OXYGEN SATURATION: 100 % | HEART RATE: 66 BPM | RESPIRATION RATE: 12 BRPM | DIASTOLIC BLOOD PRESSURE: 80 MMHG

## 2019-10-18 DIAGNOSIS — R11.0 NAUSEA: Primary | ICD-10-CM

## 2019-10-18 LAB
C DIFF TOX B STL QL: NEGATIVE
SPECIMEN SOURCE: NORMAL

## 2019-10-18 PROCEDURE — 87493 C DIFF AMPLIFIED PROBE: CPT | Performed by: PHYSICIAN ASSISTANT

## 2019-10-18 PROCEDURE — 99213 OFFICE O/P EST LOW 20 MIN: CPT | Performed by: PHYSICIAN ASSISTANT

## 2019-10-18 RX ORDER — ONDANSETRON 4 MG/1
4 TABLET, ORALLY DISINTEGRATING ORAL EVERY 8 HOURS PRN
Qty: 10 TABLET | Refills: 0 | Status: SHIPPED | OUTPATIENT
Start: 2019-10-18 | End: 2022-10-31

## 2019-10-18 NOTE — PROGRESS NOTES
Subjective     Khushi Sawant is a 54 year old female who presents to clinic today for the following health issues:    HPI   Acute Illness   Acute illness concerns: Nausea  Onset: week    Fever: no    Chills/Sweats: YES    Headache (location?): no    Sinus Pressure:no    Conjunctivitis:  no    Ear Pain: no    Rhinorrhea: no    Congestion: no    Sore Throat: no     Cough: no    Wheeze: no    Decreased Appetite: no    Nausea: YES-sx worse in the morning when waking up till about noon    Vomiting: no    Diarrhea:  no    Dysuria/Freq.: no    Fatigue/Achiness: no    Sick/Strep Exposure: no     Therapies Tried and outcome: [place] diet, ginger, B6, Dramamine nausea, peppermint tea      Patient notes she was diagnosed with C. Difficile 4 years ago. At that time her only symptom was nausea. She was treated for a UTI 3 weeks ago, started on Cipro. She has now felt ill with nausea for the past week. She denies any diarrhea but is having looser stools and minimal left sided abdominal discomfort.    Reviewed  Allergies  Meds  Problems         Review of Systems   GENERAL:  No fevers      Objective    /80 (BP Location: Right arm, Patient Position: Chair, Cuff Size: Adult Regular)   Pulse 66   Temp 98.5  F (36.9  C) (Oral)   Resp 12   Wt 71.7 kg (158 lb)   SpO2 100%   BMI 25.12 kg/m    Body mass index is 25.12 kg/m .  Physical Exam   GENERAL: No acute distress  HEENT: Normocephalic,  CARDIAC: Regular rate and rhythm. No murmurs.  PULMONARY: Lungs are clear to auscultation bilaterally. No wheezes, rhonchi or crackles.  GI: Active bowel sounds, abdomen is soft and non-tender  NEURO: Alert and non-focal    Diagnostic Test Results:  C. Difficile: PENDING        Assessment & Plan     1. Nausea  Patient's nausea feels similar to when she last had C. difficile.  We will test her for this today.  She was given Zofran to use as needed for the nausea.  At this time I did not feel further lab testing was indicated, however  if symptoms do not improve on their own we can consider further evaluation with CMP and CBC.  I recommended patient follow-up if symptoms not improving.  We will contact her once the lab results have returned, hopefully later today or early tomorrow.  - Clostridium difficile Toxin B PCR; Future  - ondansetron (ZOFRAN-ODT) 4 MG ODT tab; Take 1 tablet (4 mg) by mouth every 8 hours as needed for nausea  Dispense: 10 tablet; Refill: 0     Aimee Jackson PA-C  [facility]

## 2019-10-18 NOTE — TELEPHONE ENCOUNTER
Per consent to communicate: Left detailed voice mail with providers message with results.     Eleonora Flowers, CMA

## 2019-12-02 ENCOUNTER — TELEPHONE (OUTPATIENT)
Dept: FAMILY MEDICINE | Facility: CLINIC | Age: 54
End: 2019-12-02

## 2019-12-02 NOTE — TELEPHONE ENCOUNTER
Summary:    Patient is due/failing the following:   PAP    Reviewed:  [x] CARE EVERYWHERE  [x] LAST OV NOTE INCLUDING ENDO  [x] FYI TAB  [x] MYCHART ACTIVE?  [x] LAST PANEL ENCOUNTER  [x] FUTURE APPTS  [x] IMMUNIZATIONS          Action needed:   Patient needs office visit for pap.    Type of outreach:    Phone, left message for patient to call back.  and Sent MyChart message.                                                                               Lolly Thompson/[facility]---[facility]
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
## 2020-03-22 ENCOUNTER — HEALTH MAINTENANCE LETTER (OUTPATIENT)
Age: 55
End: 2020-03-22

## 2020-04-08 ENCOUNTER — MYC MEDICAL ADVICE (OUTPATIENT)
Dept: FAMILY MEDICINE | Facility: CLINIC | Age: 55
End: 2020-04-08

## 2021-01-15 ENCOUNTER — HEALTH MAINTENANCE LETTER (OUTPATIENT)
Age: 56
End: 2021-01-15

## 2021-06-06 NOTE — LETTER
[facility]   [address] [place] [address] [phone]    February 27, 2017      Khushi Sawant  [address] [id number]          Dear Khushi,    The results of your recent tests were within normal limits.  Enclosed is a copy of the results.  It was a pleasure to see you at your last appointment.    If you have any questions or concerns, please call the clinic.      Sincerely,        Manjula Sargent NP      Results for orders placed or performed in visit on 02/24/17   Comprehensive metabolic panel   Result Value Ref Range    Sodium 140 133 - 144 mmol/L    Potassium 3.8 3.4 - 5.3 mmol/L    Chloride 104 94 - 109 mmol/L    Carbon Dioxide 27 20 - 32 mmol/L    Anion Gap 9 3 - 14 mmol/L    Glucose 109 (H) 70 - 99 mg/dL    Urea Nitrogen 21 7 - 30 mg/dL    Creatinine 0.66 0.52 - 1.04 mg/dL    GFR Estimate >90  Non  GFR Calc   >60 mL/min/1.7m2    GFR Estimate If Black >90   GFR Calc   >60 mL/min/1.7m2    Calcium 9.3 8.5 - 10.1 mg/dL    Bilirubin Total 0.5 0.2 - 1.3 mg/dL    Albumin 4.3 3.4 - 5.0 g/dL    Protein Total 7.2 6.8 - 8.8 g/dL    Alkaline Phosphatase 50 40 - 150 U/L    ALT 32 0 - 50 U/L    AST 20 0 - 45 U/L   TSH   Result Value Ref Range    TSH 0.75 0.40 - 4.00 mU/L   Vitamin B12   Result Value Ref Range    Vitamin B12 501 193 - 986 pg/mL       
Dr. Brenna Arias (psychiatrist)

## 2021-09-04 ENCOUNTER — HEALTH MAINTENANCE LETTER (OUTPATIENT)
Age: 56
End: 2021-09-04

## 2021-10-12 ENCOUNTER — TRANSFERRED RECORDS (OUTPATIENT)
Dept: HEALTH INFORMATION MANAGEMENT | Facility: CLINIC | Age: 56
End: 2021-10-12

## 2021-10-12 LAB
HPV ABSTRACT: NORMAL
PAP-ABSTRACT: NORMAL

## 2021-12-15 ENCOUNTER — TRANSFERRED RECORDS (OUTPATIENT)
Dept: HEALTH INFORMATION MANAGEMENT | Facility: CLINIC | Age: 56
End: 2021-12-15

## 2021-12-15 NOTE — LETTER
[facility]  [address] [place] [address]  [phone]                    November 9, 2022    Khushi Sawant  [address]  [place] [address]      Dear Khushi,    Here is a summary of your recent test results:    Mammogram was normal.  ADVISE: rechecking in 1 year.     In addition, here is a list of due or overdue Health Maintenance reminders.    Health Maintenance Due   Topic Date Due     ANNUAL REVIEW OF HM ORDERS  Never done     Discuss Advance Care Planning  Never done     HIV Screening  Never done     Diptheria Tetanus Pertussis (DTAP/TDAP/TD) Vaccine (1 - Tdap) 12/04/2007     Cholesterol Lab  12/08/2020     PAP Smear  12/15/2021     COVID-19 Vaccine (4 - Booster for Moderna series) 02/07/2022     Flu Vaccine (1) 09/01/2022     Yearly Preventive Visit  10/12/2022       Thank you very much for trusting [facility].     Healthy regards,          Dwayne Nieves M.D.

## 2022-04-16 ENCOUNTER — HEALTH MAINTENANCE LETTER (OUTPATIENT)
Age: 57
End: 2022-04-16

## 2022-10-17 ENCOUNTER — E-VISIT (OUTPATIENT)
Dept: URGENT CARE | Facility: CLINIC | Age: 57
End: 2022-10-17
Payer: COMMERCIAL

## 2022-10-17 ENCOUNTER — TELEPHONE (OUTPATIENT)
Dept: URGENT CARE | Facility: URGENT CARE | Age: 57
End: 2022-10-17

## 2022-10-17 DIAGNOSIS — N39.0 ACUTE UTI (URINARY TRACT INFECTION): Primary | ICD-10-CM

## 2022-10-17 PROCEDURE — 99421 OL DIG E/M SVC 5-10 MIN: CPT | Performed by: FAMILY MEDICINE

## 2022-10-17 RX ORDER — NITROFURANTOIN 25; 75 MG/1; MG/1
100 CAPSULE ORAL 2 TIMES DAILY
Qty: 10 CAPSULE | Refills: 0 | Status: SHIPPED | OUTPATIENT
Start: 2022-10-17 | End: 2022-10-22

## 2022-10-17 NOTE — TELEPHONE ENCOUNTER
Erinn Lozada MD  You 1 hour ago (1:43 PM)     KK  Patient should go to UC for UA and eval- thanks        Patient advised of MD recommendation to be seen in urgent care. JENNIFER Mcbride R.N.

## 2022-10-17 NOTE — PATIENT INSTRUCTIONS
Dear Khushi Sawant    After reviewing your responses, I've been able to diagnose you with a urinary tract infection, which is a common infection of the bladder with bacteria.  This is not a sexually transmitted infection, though urinating immediately after intercourse can help prevent infections.  Drinking lots of fluids is also helpful to clear your current infection and prevent the next one.      I have sent a prescription for antibiotics to your pharmacy to treat this infection.    It is important that you take all of your prescribed medication even if your symptoms are improving after a few doses.  Taking all of your medicine helps prevent the symptoms from returning.     If your symptoms worsen, you develop pain in your back or stomach, develop fevers, or are not improving in 5 days, please contact your primary care provider for an appointment or visit any of our convenient Walk-in or Urgent Care Centers to be seen, which can be found on our website here.    Thanks again for choosing us as your health care partner,    Erinn Lozada MD    Urinary Tract Infections in Women  Urinary tract infections (UTIs) are most often caused by bacteria. These bacteria enter the urinary tract. The bacteria may come from inside the body. Or they may travel from the skin outside the rectum or vagina into the urethra. Female anatomy makes it easy for bacteria from the bowel to enter a woman s urinary tract, which is the most common source of UTI. This means women develop UTIs more often than men. Pain in or around the urinary tract is a common UTI symptom. But the only way to know for sure if you have a UTI for the healthcare provider to test your urine. The two tests that may be done are the urinalysis and urine culture.     Types of UTIs    Cystitis. A bladder infection (cystitis) is the most common UTI in women. You may have urgent or frequent need to pee. You may also have pain, burning when you pee, and bloody  urine.    Urethritis. This is an inflamed urethra, which is the tube that carries urine from the bladder to outside the body. You may have lower stomach or back pain. You may also have urgent or frequent need to pee.    Pyelonephritis. This is a kidney infection. If not treated, it can be serious and damage your kidneys. In severe cases, you may need to stay in the hospital. You may have a fever and lower back pain.    Medicines to treat a UTI  Most UTIs are treated with antibiotics. These kill the bacteria. The length of time you need to take them depends on the type of infection. It may be as short as 3 days. If you have repeated UTIs, you may need a low-dose antibiotic for several months. Take antibiotics exactly as directed. Don t stop taking them until all of the medicine is gone. If you stop taking the antibiotic too soon, the infection may not go away. You may also develop a resistance to the antibiotic. This can make it much harder to treat.   Lifestyle changes to treat and prevent UTIs   The lifestyle changes below will help get rid of your UTI. They may also help prevent future UTIs.     Drink plenty of fluids. This includes water, juice, or other caffeine-free drinks. Fluids help flush bacteria out of your body.    Empty your bladder. Always empty your bladder when you feel the urge to pee. And always pee before going to sleep. Urine that stays in your bladder can lead to infection. Try to pee before and after sex as well.    Practice good personal hygiene. Wipe yourself from front to back after using the toilet. This helps keep bacteria from getting into the urethra.    Use condoms during sex. These help prevent UTIs caused by sexually transmitted bacteria. Also don't use spermicides during sex. These can increase the risk for UTIs. Choose other forms of birth control instead. For women who tend to get UTIs after sex, a low-dose of a preventive antibiotic may be used. Be sure to discuss this option with  your healthcare provider.    Follow up with your healthcare provider as directed. He or she may test to make sure the infection has cleared. If needed, more treatment may be started.  Giselle last reviewed this educational content on 7/1/2019 2000-2021 [employer]. All rights reserved. This information is not intended as a substitute for professional medical care. Always follow your healthcare professional's instructions.

## 2022-10-17 NOTE — TELEPHONE ENCOUNTER
Patient had E-visit with Dr. Lozada this morning for urinary symptoms.  She was prescribed Nitrofurantoin x5 days but she had just finished a 5 day course of Nitrofurantoin on 10/14.  She felt better within 24 hours of starting med but symptoms started returning about 24 hours after completing course.     Patient with history of C. Diff so is cautious about antibiotics but she would also like a UA/UC ordered.  No cloudiness or foul odor to urine, no flank pain, no fever. Does have urinary frequency and burning at the end of urination.      She tried to make an in clinic appointment but nothing was available so she did the virtual visit though did not realize she would not have an opportunity to request an order for UA/UC.  JENNIFER Mcbride R.N.

## 2022-10-18 ENCOUNTER — LAB REQUISITION (OUTPATIENT)
Dept: LAB | Facility: CLINIC | Age: 57
End: 2022-10-18

## 2022-10-18 ENCOUNTER — TRANSFERRED RECORDS (OUTPATIENT)
Dept: HEALTH INFORMATION MANAGEMENT | Facility: CLINIC | Age: 57
End: 2022-10-18

## 2022-10-18 DIAGNOSIS — R30.0 DYSURIA: ICD-10-CM

## 2022-10-18 PROCEDURE — 87086 URINE CULTURE/COLONY COUNT: CPT | Performed by: NURSE PRACTITIONER

## 2022-10-19 LAB — BACTERIA UR CULT: ABNORMAL

## 2022-10-22 ENCOUNTER — HEALTH MAINTENANCE LETTER (OUTPATIENT)
Age: 57
End: 2022-10-22

## 2022-10-25 ENCOUNTER — TRANSFERRED RECORDS (OUTPATIENT)
Dept: HEALTH INFORMATION MANAGEMENT | Facility: CLINIC | Age: 57
End: 2022-10-25

## 2022-10-31 ENCOUNTER — HOSPITAL ENCOUNTER (OUTPATIENT)
Dept: CT IMAGING | Facility: CLINIC | Age: 57
Discharge: HOME | End: 2022-10-31
Attending: PHYSICIAN ASSISTANT | Admitting: PHYSICIAN ASSISTANT
Payer: COMMERCIAL

## 2022-10-31 ENCOUNTER — OFFICE VISIT (OUTPATIENT)
Dept: PEDIATRICS | Facility: CLINIC | Age: 57
End: 2022-10-31
Attending: NURSE PRACTITIONER
Payer: COMMERCIAL

## 2022-10-31 ENCOUNTER — OFFICE VISIT (OUTPATIENT)
Dept: FAMILY MEDICINE | Facility: CLINIC | Age: 57
End: 2022-10-31
Payer: COMMERCIAL

## 2022-10-31 ENCOUNTER — NURSE TRIAGE (OUTPATIENT)
Dept: NURSING | Facility: CLINIC | Age: 57
End: 2022-10-31

## 2022-10-31 VITALS
BODY MASS INDEX: 27.82 KG/M2 | WEIGHT: 175 LBS | SYSTOLIC BLOOD PRESSURE: 144 MMHG | TEMPERATURE: 98.1 F | RESPIRATION RATE: 18 BRPM | HEART RATE: 79 BPM | DIASTOLIC BLOOD PRESSURE: 90 MMHG | OXYGEN SATURATION: 100 %

## 2022-10-31 VITALS
SYSTOLIC BLOOD PRESSURE: 156 MMHG | OXYGEN SATURATION: 99 % | WEIGHT: 175 LBS | DIASTOLIC BLOOD PRESSURE: 91 MMHG | TEMPERATURE: 98 F | HEART RATE: 68 BPM | BODY MASS INDEX: 27.47 KG/M2 | HEIGHT: 67 IN

## 2022-10-31 DIAGNOSIS — R10.2 SUPRAPUBIC PRESSURE: ICD-10-CM

## 2022-10-31 DIAGNOSIS — R10.9 LEFT FLANK PAIN: ICD-10-CM

## 2022-10-31 DIAGNOSIS — K63.5 SESSILE COLONIC POLYP: ICD-10-CM

## 2022-10-31 DIAGNOSIS — Z86.19 HISTORY OF CLOSTRIDIOIDES DIFFICILE COLITIS: ICD-10-CM

## 2022-10-31 DIAGNOSIS — N20.0 KIDNEY STONE: Primary | ICD-10-CM

## 2022-10-31 DIAGNOSIS — R30.0 DYSURIA: ICD-10-CM

## 2022-10-31 DIAGNOSIS — N28.89 CALIECTASIS: ICD-10-CM

## 2022-10-31 DIAGNOSIS — R39.15 URGENCY OF URINATION: Primary | ICD-10-CM

## 2022-10-31 DIAGNOSIS — Z90.711 S/P LAPAROSCOPIC SUPRACERVICAL HYSTERECTOMY: ICD-10-CM

## 2022-10-31 PROBLEM — F41.9 ANXIETY: Status: RESOLVED | Noted: 2018-07-17 | Resolved: 2022-10-31

## 2022-10-31 PROBLEM — A04.72 C. DIFFICILE DIARRHEA: Status: RESOLVED | Noted: 2017-01-09 | Resolved: 2022-10-31

## 2022-10-31 PROBLEM — R42 VERTIGO: Status: RESOLVED | Noted: 2017-01-09 | Resolved: 2022-10-31

## 2022-10-31 LAB
ALBUMIN SERPL BCG-MCNC: 4.7 G/DL (ref 3.5–5.2)
ALBUMIN UR-MCNC: NEGATIVE MG/DL
ALP SERPL-CCNC: 84 U/L (ref 35–104)
ALT SERPL W P-5'-P-CCNC: 42 U/L (ref 10–35)
ANION GAP SERPL CALCULATED.3IONS-SCNC: 12 MMOL/L (ref 7–15)
APPEARANCE UR: CLEAR
AST SERPL W P-5'-P-CCNC: 25 U/L (ref 10–35)
BACTERIA #/AREA URNS HPF: ABNORMAL /HPF
BASOPHILS # BLD AUTO: 0 10E3/UL (ref 0–0.2)
BASOPHILS NFR BLD AUTO: 0 %
BILIRUB SERPL-MCNC: 0.4 MG/DL
BILIRUB UR QL STRIP: NEGATIVE
BUN SERPL-MCNC: 16.5 MG/DL (ref 6–20)
CALCIUM SERPL-MCNC: 9.9 MG/DL (ref 8.6–10)
CHLORIDE SERPL-SCNC: 101 MMOL/L (ref 98–107)
CLUE CELLS: NORMAL
COLOR UR AUTO: YELLOW
CREAT SERPL-MCNC: 0.66 MG/DL (ref 0.51–0.95)
CRP SERPL-MCNC: <3 MG/L
DEPRECATED HCO3 PLAS-SCNC: 26 MMOL/L (ref 22–29)
EOSINOPHIL # BLD AUTO: 0.1 10E3/UL (ref 0–0.7)
EOSINOPHIL NFR BLD AUTO: 1 %
ERYTHROCYTE [DISTWIDTH] IN BLOOD BY AUTOMATED COUNT: 11.9 % (ref 10–15)
ERYTHROCYTE [SEDIMENTATION RATE] IN BLOOD BY WESTERGREN METHOD: 16 MM/HR (ref 0–30)
GFR SERPL CREATININE-BSD FRML MDRD: >90 ML/MIN/1.73M2
GLUCOSE SERPL-MCNC: 111 MG/DL (ref 70–99)
GLUCOSE UR STRIP-MCNC: NEGATIVE MG/DL
HCT VFR BLD AUTO: 47.3 % (ref 35–47)
HGB BLD-MCNC: 15.1 G/DL (ref 11.7–15.7)
HGB UR QL STRIP: ABNORMAL
IMM GRANULOCYTES # BLD: 0 10E3/UL
IMM GRANULOCYTES NFR BLD: 0 %
KETONES UR STRIP-MCNC: NEGATIVE MG/DL
LEUKOCYTE ESTERASE UR QL STRIP: NEGATIVE
LYMPHOCYTES # BLD AUTO: 1.8 10E3/UL (ref 0.8–5.3)
LYMPHOCYTES NFR BLD AUTO: 26 %
MCH RBC QN AUTO: 31.5 PG (ref 26.5–33)
MCHC RBC AUTO-ENTMCNC: 31.9 G/DL (ref 31.5–36.5)
MCV RBC AUTO: 99 FL (ref 78–100)
MONOCYTES # BLD AUTO: 0.4 10E3/UL (ref 0–1.3)
MONOCYTES NFR BLD AUTO: 6 %
NEUTROPHILS # BLD AUTO: 4.5 10E3/UL (ref 1.6–8.3)
NEUTROPHILS NFR BLD AUTO: 67 %
NITRATE UR QL: NEGATIVE
NRBC # BLD AUTO: 0 10E3/UL
NRBC BLD AUTO-RTO: 0 /100
PH UR STRIP: 5.5 [PH] (ref 5–7)
PLATELET # BLD AUTO: 208 10E3/UL (ref 150–450)
POTASSIUM SERPL-SCNC: 4.1 MMOL/L (ref 3.4–5.3)
PROT SERPL-MCNC: 7.2 G/DL (ref 6.4–8.3)
RBC # BLD AUTO: 4.8 10E6/UL (ref 3.8–5.2)
RBC #/AREA URNS AUTO: ABNORMAL /HPF
SODIUM SERPL-SCNC: 139 MMOL/L (ref 136–145)
SP GR UR STRIP: 1.01 (ref 1–1.03)
SQUAMOUS #/AREA URNS AUTO: ABNORMAL /LPF
TRICHOMONAS, WET PREP: NORMAL
UROBILINOGEN UR STRIP-ACNC: 0.2 E.U./DL
WBC # BLD AUTO: 6.8 10E3/UL (ref 4–11)
WBC #/AREA URNS AUTO: ABNORMAL /HPF
WBC'S/HIGH POWER FIELD, WET PREP: NORMAL
YEAST, WET PREP: NORMAL

## 2022-10-31 PROCEDURE — 250N000011 HC RX IP 250 OP 636: Performed by: PHYSICIAN ASSISTANT

## 2022-10-31 PROCEDURE — 80053 COMPREHEN METABOLIC PANEL: CPT | Performed by: PHYSICIAN ASSISTANT

## 2022-10-31 PROCEDURE — 87210 SMEAR WET MOUNT SALINE/INK: CPT | Performed by: NURSE PRACTITIONER

## 2022-10-31 PROCEDURE — 36415 COLL VENOUS BLD VENIPUNCTURE: CPT | Performed by: PHYSICIAN ASSISTANT

## 2022-10-31 PROCEDURE — 85025 COMPLETE CBC W/AUTO DIFF WBC: CPT | Performed by: PHYSICIAN ASSISTANT

## 2022-10-31 PROCEDURE — 74178 CT ABD&PLV WO CNTR FLWD CNTR: CPT

## 2022-10-31 PROCEDURE — 85652 RBC SED RATE AUTOMATED: CPT | Performed by: PHYSICIAN ASSISTANT

## 2022-10-31 PROCEDURE — 250N000009 HC RX 250: Performed by: PHYSICIAN ASSISTANT

## 2022-10-31 PROCEDURE — 87086 URINE CULTURE/COLONY COUNT: CPT | Performed by: NURSE PRACTITIONER

## 2022-10-31 PROCEDURE — 86140 C-REACTIVE PROTEIN: CPT | Performed by: PHYSICIAN ASSISTANT

## 2022-10-31 PROCEDURE — 81001 URINALYSIS AUTO W/SCOPE: CPT | Performed by: NURSE PRACTITIONER

## 2022-10-31 PROCEDURE — 99215 OFFICE O/P EST HI 40 MIN: CPT | Performed by: PHYSICIAN ASSISTANT

## 2022-10-31 PROCEDURE — 99207 REFERRAL TO ACUTE AND DIAGNOSTIC SERVICES: CPT | Performed by: NURSE PRACTITIONER

## 2022-10-31 RX ORDER — HYDROCODONE BITARTRATE AND ACETAMINOPHEN 5; 325 MG/1; MG/1
1 TABLET ORAL EVERY 6 HOURS PRN
Qty: 10 TABLET | Refills: 0 | Status: SHIPPED | OUTPATIENT
Start: 2022-10-31 | End: 2022-11-30

## 2022-10-31 RX ORDER — TAMSULOSIN HYDROCHLORIDE 0.4 MG/1
0.4 CAPSULE ORAL DAILY
Qty: 30 CAPSULE | Refills: 0 | Status: SHIPPED | OUTPATIENT
Start: 2022-10-31 | End: 2022-11-10

## 2022-10-31 RX ORDER — IOPAMIDOL 755 MG/ML
120 INJECTION, SOLUTION INTRAVASCULAR ONCE
Status: COMPLETED | OUTPATIENT
Start: 2022-10-31 | End: 2022-10-31

## 2022-10-31 RX ADMIN — SODIUM CHLORIDE 95 ML: 9 INJECTION, SOLUTION INTRAVENOUS at 17:15

## 2022-10-31 RX ADMIN — IOPAMIDOL 86 ML: 755 INJECTION, SOLUTION INTRAVENOUS at 17:15

## 2022-10-31 NOTE — RESULT ENCOUNTER NOTE
Results discussed directly with patient while patient was present. Any further details documented in the note.   Ellyn Blanco PA-C

## 2022-10-31 NOTE — PROGRESS NOTES
"  Assessment & Plan     Kidney stone  Caliectasis  Dysuria  Left flank pain  Suprapubic pressure  History of Clostridioides difficile colitis  Sessile colonic polyp 2015 -  normal 2018 [facility] - 5 year f/u recommended  S/P laparoscopic supracervical hysterectomy  Stat CT reveals nonobstructive stones, suspect right nonobstructive ureteral stone causing the majority of symptoms.  Aside from mildly elevated liver function test, labs essentially unremarkable.  No further antibiotics unless urine culture necessitates.  Tamsulosin to aid in passage of stone.  Straining urine recommended so that stone analysis can be performed.  Hydrocodone given for short-term acute/severe pain.  Hydration efforts encouraged.  Follow-up with urology.  Referral placed today.  All questions answered to the patient's satisfaction.  - tamsulosin (FLOMAX) 0.4 MG capsule  Dispense: 30 capsule; Refill: 0  - Adult Urology  Referral  - Stone analysis  - HYDROcodone-acetaminophen (NORCO) 5-325 MG tablet  Dispense: 10 tablet; Refill: 0  - Referral to Acute and Diagnostic Services (Day of diagnostic / First order acute)  - CT Abdomen Pelvis w/o & w Contrast  - CBC with platelets differential  - Comprehensive metabolic panel  - Erythrocyte sedimentation rate auto  - CRP inflammation      Review of prior external note(s) from - [facility]where information from [facility], OBGYN reviewed  56 minutes spent on the date of the encounter doing chart review, history and exam, documentation and further activities per the note       BMI:   Estimated body mass index is 27.82 kg/m  as calculated from the following:    Height as of an earlier encounter on 10/31/22: 1.689 m (5' 6.5\").    Weight as of this encounter: 79.4 kg (175 lb).   Weight management plan: Patient was referred to their PCP to discuss a diet and exercise plan.      Return in about 3 days (around 11/3/2022) for urology.    Ellyn Blanco PA-C  [facility] " LISETTE Rasmussen is a 57 year old, presenting for the following health issues:  Flank Pain (Left sided flank pain X 1 day, resolving now) and Urinary Problem (Urinary urgency, denies dysuria)      HPI     Abdominal/Flank Pain  Onset/Duration: X 1 day  Description:   Character: Cramping  Location: right lower quadrant left lower quadrant left flank  Radiation: Pelvic region  Intensity: 2/10  Progression of Symptoms:  improving  Accompanying Signs & Symptoms:  Fever/Chills: no   Gas/Bloating: YES  Nausea: YES  Vomiting: no   Diarrhea: no   Constipation: no   Dysuria or Hematuria: YES- Hematuria, blood found on pad  History:   Trauma: no   Previous similar pain: no   Previous tests done: YES- UA/Culture  Previous Abdominal Surgery: YES- Hysterectomy,  X 2  Precipitating factors:   Does the pain change with:     Food: YES- increased pain    Bowel Movement: YES- improved pain    Urination: no    Other factors:  no   Therapies tried and outcome: Nitrofurantoin, Ciprofloxacin, cranberry tabs and probiotic    When did you eat last: 13:00 Gatorade 12:00 Lunch-chicken pieces and rice and peas.    E-visit 10/17/2022 with lab only urine that showed Proteus with resistance.  Changed to ciprofloxacin x3 days -last dose 10/13/2022 per patient.    Benign supracervical hysterectomy     Colonoscopy 2015 sessile serrated adenoma with a 3-year follow-up recommended at [facility].   Pt reports normal colonoscopy 2018 with 5 year follow up [facility]      Review of Systems   Constitutional, HEENT, cardiovascular, pulmonary, GI, , musculoskeletal, neuro, skin, endocrine and psych systems are negative, except as otherwise noted.      Objective    BP (!) 144/90 (BP Location: Right arm, Patient Position: Chair, Cuff Size: Adult Regular)   Pulse 79   Temp 98.1  F (36.7  C) (Oral)   Resp 18   Wt 79.4 kg (175 lb)   SpO2 100%   BMI 27.82 kg/m    Body mass index is 27.82  kg/m .  Physical Exam   GENERAL: healthy, alert and no distress  EYES: Eyes grossly normal to inspection  RESP: lungs clear to auscultation - no rales, rhonchi or wheezes  CV: regular rate and rhythm, normal S1 S2, no S3 or S4, no murmur, click or rub, no peripheral edema and peripheral pulses strong  ABDOMEN: soft, suprapubic, right lower quadrant, left lower quadrant tenderness without guarding, no hepatosplenomegaly, no masses and bowel sounds normal.  No CVA tenderness  MS: no gross musculoskeletal defects noted, no edema  SKIN: no suspicious lesions or rashes  NEURO: Normal strength and tone, mentation intact and speech normal  PSYCH: mentation appears normal, affect normal/bright    Results for orders placed or performed in visit on 10/31/22 (from the past 24 hour(s))   CBC with platelets differential    Narrative    The following orders were created for panel order CBC with platelets differential.  Procedure                               Abnormality         Status                     ---------                               -----------         ------                     CBC with platelets and d...[[id number]]  Abnormal            Final result                 Please view results for these tests on the individual orders.   Comprehensive metabolic panel   Result Value Ref Range    Sodium 139 136 - 145 mmol/L    Potassium 4.1 3.4 - 5.3 mmol/L    Chloride 101 98 - 107 mmol/L    Carbon Dioxide (CO2) 26 22 - 29 mmol/L    Anion Gap 12 7 - 15 mmol/L    Urea Nitrogen 16.5 6.0 - 20.0 mg/dL    Creatinine 0.66 0.51 - 0.95 mg/dL    Calcium 9.9 8.6 - 10.0 mg/dL    Glucose 111 (H) 70 - 99 mg/dL    Alkaline Phosphatase 84 35 - 104 U/L    AST 25 10 - 35 U/L    ALT 42 (H) 10 - 35 U/L    Protein Total 7.2 6.4 - 8.3 g/dL    Albumin 4.7 3.5 - 5.2 g/dL    Bilirubin Total 0.4 <=1.2 mg/dL    GFR Estimate >90 >60 mL/min/1.73m2   Erythrocyte sedimentation rate auto   Result Value Ref Range    Erythrocyte Sedimentation Rate 16 0 - 30  mm/hr   CRP inflammation   Result Value Ref Range    CRP Inflammation <3.00 <5.00 mg/L   CBC with platelets and differential   Result Value Ref Range    WBC Count 6.8 4.0 - 11.0 10e3/uL    RBC Count 4.80 3.80 - 5.20 10e6/uL    Hemoglobin 15.1 11.7 - 15.7 g/dL    Hematocrit 47.3 (H) 35.0 - 47.0 %    MCV 99 78 - 100 fL    MCH 31.5 26.5 - 33.0 pg    MCHC 31.9 31.5 - 36.5 g/dL    RDW 11.9 10.0 - 15.0 %    Platelet Count 208 150 - 450 10e3/uL    % Neutrophils 67 %    % Lymphocytes 26 %    % Monocytes 6 %    % Eosinophils 1 %    % Basophils 0 %    % Immature Granulocytes 0 %    NRBCs per 100 WBC 0 <1 /100    Absolute Neutrophils 4.5 1.6 - 8.3 10e3/uL    Absolute Lymphocytes 1.8 0.8 - 5.3 10e3/uL    Absolute Monocytes 0.4 0.0 - 1.3 10e3/uL    Absolute Eosinophils 0.1 0.0 - 0.7 10e3/uL    Absolute Basophils 0.0 0.0 - 0.2 10e3/uL    Absolute Immature Granulocytes 0.0 <=0.4 10e3/uL    Absolute NRBCs 0.0 10e3/uL     Recent Results (from the past 744 hour(s))   CT Abdomen Pelvis w/o & w Contrast    Narrative    EXAM: CT ABDOMEN PELVIS W/O and W CONTRAST  LOCATION: [facility]  DATE/TIME: 10/31/2022 5:30 PM    INDICATION: Abdominal pain with hematuria. Recent urinary tract infection.  COMPARISON: None.  TECHNIQUE: CT scan of the abdomen and pelvis was performed before and after injection of IV contrast. Multiplanar reformats were obtained. Dose reduction techniques were used.  CONTRAST:  86mL Isovue 370     FINDINGS:   LOWER CHEST: Normal.    HEPATOBILIARY: Normal.    PANCREAS: Normal.    SPLEEN: Normal.    ADRENAL GLANDS: Normal.    KIDNEYS/BLADDER: Two nonobstructing right lower pole renal calculi, the largest of which is an infundibular calculus that measures 10 x 6 mm and causes mild caliectasis in the lower pole. Three nonobstructing left renal calculi, the largest of which is   at the midpole and measures 4 x 4 mm. Nonobstructing 4 x 2 mm distal right ureteral calculus (3/#147) near the uterovesical  junction. No left renal calculi. No intraluminal bladder calculi. No renal mass. No evidence of pyelonephritis. No evidence of   bladder mass.    BOWEL: No obstruction or inflammation. Normal appendix.    LYMPH NODES: Normal.    VASCULATURE: No abdominal aortic aneurysm. Retroaortic left renal vein. Patent portal, splenic, and superior mesenteric veins.    PELVIC ORGANS: Normal.    MUSCULOSKELETAL: No acute bony abnormalities.      Impression    IMPRESSION:     1.  Nonobstructing 4 x 2 mm distal right ureteral calculus near the ureterovesical junction.    2.  Multiple bilateral nonobstructing intrarenal calculi, measuring up to 10 x 6 mm on the right and 4 x 4 mm on the left.    3.  Mild right lower pole caliectasis related to the dominant right 10 x 6 mm infundibular calculus. Remainder of the collecting system is nondilated.     4.  No renal mass or evidence of upper tract urothelial malignancy.    5.  No pyelonephritis or other evidence of urinary tract infection.

## 2022-10-31 NOTE — TELEPHONE ENCOUNTER
Patient is calling to report UTI symptoms.    She has been dealing with recurrent UTIs.  She had an evisit and was prescribed macrobid which cleared up her symptoms.  Then about 2 weeks ago, she had her annual physical with her OB and her urine culture came back positive and resistant to macrobid.  She was prescribed cipro and last dose was taken on 10/13 - per pt.    Yesterday, she started having urgency, burning pain 5/10, and dribbling.  She also notes having pink urine.  She took an AZO test and tested positive for nitrites.  Denies having fevers.    Pt is concerned about antibiotics as she has a history of c.diff.    Disposition: See PCP within 24 hours. Care advice given.  Agreed to go to walk in clinic if no available appointments today.  Pt verbalized understanding.    Tari Orozco RN, BSN Nurse Triage Advisor 10/31/2022 6:59 AM       Reason for Disposition    Blood in urine (red, pink, or tea-colored)    Additional Information    Negative: Shock suspected (e.g., cold/pale/clammy skin, too weak to stand, low BP, rapid pulse)    Negative: Sounds like a life-threatening emergency to the triager    Negative: [1] Unable to urinate (or only a few drops) > 4 hours AND [2] bladder feels very full (e.g., palpable bladder or strong urge to urinate)    Negative: Vomiting    Negative: Patient sounds very sick or weak to the triager    Negative: [1] SEVERE pain with urination (e.g., excruciating) AND [2] not improved after 2 hours of pain medicine and Sitz bath    Negative: Fever > 100.4 F (38.0 C)    Negative: Side (flank) or lower back pain present    Negative: Diabetes mellitus or weak immune system (e.g., HIV positive, cancer chemo, splenectomy, organ transplant, chronic steroids)    Negative: Bedridden (e.g., nursing home patient, CVA, chronic illness, recovering from surgery)    Negative: Artificial heart valve or artificial joint    Negative: Unusual vaginal discharge (e.g., bad smelling, yellow, green, or  foamy-white)    Negative: Patient is worried they have a sexually transmitted infection (STI)    Negative: Possibility of pregnancy    Protocols used: URINATION PAIN - FEMALE-A-AH

## 2022-10-31 NOTE — PROGRESS NOTES
Assessment & Plan     Urgency of urination  Treated twice for UTI not improving; Symptoms are concerning for kidney stone etiology.   Called [facility] for referral and they graciously accept. Patient given address and number to location. She is going straight there. No food to be consumed. Follow up and plan of care based on [facility] findings.   Khushi verbalizes understanding of plan of care and is in agreement.   - UA Macro with Reflex to Micro and Culture - lab collect  - Wet prep - Clinic Collect  - Urine Microscopic  - Urine Culture Aerobic Bacterial - lab collect  - Referral to Acute and Diagnostic Services (Day of diagnostic / First order acute)  - Urine Culture Aerobic Bacterial - lab collect    Left flank pain    - Referral to Acute and Diagnostic Services (Day of diagnostic / First order acute)    Suprapubic pressure    - Referral to Acute and Diagnostic Services (Day of diagnostic / First order acute)    History of Clostridioides difficile colitis    - Referral to Acute and Diagnostic Services (Day of diagnostic / First order acute)      Return today (on 10/31/2022) for [facility].      ROSE MARIE [facility]   Valery is a 57 year old, presenting for the following health issues:  Urinary Problem      History of Present Illness       Reason for visit:  Reoccuring UTI    She eats 4 or more servings of fruits and vegetables daily.She consumes 0 sweetened beverage(s) daily.She exercises with enough effort to increase her heart rate 20 to 29 minutes per day.  She exercises with enough effort to increase her heart rate 4 days per week.   She is taking medications regularly.       Urgent Care - 10/09/2022 - 10/14/2022 - Macrobid  OB/GYN appt -10/18/2022 - Cipro 3 day  Symptoms started again yesterday one sided back pain.   Has had C-Diff in past - concerned about all the antibiotics    Triaged 10/31/2022 - 7:00am  Patient is calling to report UTI symptoms.     She has been  dealing with recurrent UTIs.  She had an evisit and was prescribed macrobid which cleared up her symptoms.  Then about 2 weeks ago, she had her annual physical with her OB and her urine culture came back positive and resistant to macrobid.  She was prescribed cipro and last dose was taken on 10/13 - per pt.     Yesterday, she started having urgency, burning pain 5/10, and dribbling.  She also notes having pink urine.  She took an AZO test and tested positive for nitrites.  Denies having fevers.     Pt is concerned about antibiotics as she has a history of c.diff.     Disposition: See PCP within 24 hours. Care advice given.  Agreed to go to walk in clinic if no available appointments today.  Pt verbalized understanding.     Tari Orozco, RN, BSN Nurse Triage Advisor 10/31/2022 6:59 AM         Reason for Disposition    Blood in urine (red, pink, or tea-colored)    Additional Information    Negative: Shock suspected (e.g., cold/pale/clammy skin, too weak to stand, low BP, rapid pulse)    Negative: Sounds like a life-threatening emergency to the triager    Negative: [1] Unable to urinate (or only a few drops) > 4 hours AND [2] bladder feels very full (e.g., palpable bladder or strong urge to urinate)    Negative: Vomiting    Negative: Patient sounds very sick or weak to the triager    Negative: [1] SEVERE pain with urination (e.g., excruciating) AND [2] not improved after 2 hours of pain medicine and Sitz bath    Negative: Fever > 100.4 F (38.0 C)    Negative: Side (flank) or lower back pain present    Negative: Diabetes mellitus or weak immune system (e.g., HIV positive, cancer chemo, splenectomy, organ transplant, chronic steroids)    Negative: Bedridden (e.g., nursing home patient, CVA, chronic illness, recovering from surgery)    Negative: Artificial heart valve or artificial joint    Negative: Unusual vaginal discharge (e.g., bad smelling, yellow, green, or foamy-white)    Negative: Patient is worried they have a  "sexually transmitted infection (STI)    Negative: Possibility of pregnancy    Protocols used: URINATION PAIN - FEMALE-A-AH        Review of Systems   Constitutional, HEENT, cardiovascular, pulmonary, GI, , musculoskeletal, neuro, skin, endocrine and psych systems are negative, except as otherwise noted in the HPI.      Objective    BP (!) 156/91 (BP Location: Left arm, Patient Position: Chair, Cuff Size: Adult Regular)   Pulse 68   Temp 98  F (36.7  C) (Tympanic)   Ht 1.689 m (5' 6.5\")   Wt 79.4 kg (175 lb)   SpO2 99%   BMI 27.82 kg/m    Body mass index is 27.82 kg/m .  Physical Exam   GENERAL: healthy, alert and no distress  RESP: lungs clear to auscultation - no rales, rhonchi or wheezes  CV: regular rate and rhythm, normal S1 S2, no S3 or S4, no murmur, click or rub, no peripheral edema and peripheral pulses strong  : normal   ABDOMEN: soft, nontender, no hepatosplenomegaly, no masses and bowel sounds normal  MS: no gross musculoskeletal defects noted, no edema  BACK: no CVA tenderness, no paralumbar tenderness          "

## 2022-11-01 NOTE — RESULT ENCOUNTER NOTE
Dear Valery,    Here is a summary of your recent test results:    -No signs of bacteria or yeast vaginal infections.    For additional lab test information, labtestsonline.org is an excellent reference.    In addition, here is a list of due or overdue Health Maintenance reminders:    ANNUAL REVIEW OF HM ORDERS Never done  Discuss Advance Care Planning Never done  Hepatitis B Vaccine(1 of 3 - 3-dose series) Never done  HIV Screening Never done  Diptheria Tetanus Pertussis (DTAP/TDAP/TD) Vaccine(1 - Tdap) due on 12/04/2007  Mammogram due on 12/04/2019  Cholesterol Lab due on 12/08/2020  PAP Smear due on 12/15/2021  COVID-19 Vaccine(4 - Booster for Moderna series) due on 02/07/2022  Flu Vaccine(1) due on 09/01/2022  Yearly Preventive Visit due on 10/12/2022    Please call us at [phone] (or use [employer]) to address the above recommendations if needed.    Thank you for choosing  [facility].  It was an honor and a privilege to participate in your care.       Healthy regards,    Camila Pederson, THIERRY  [facility]

## 2022-11-02 ENCOUNTER — MYC MEDICAL ADVICE (OUTPATIENT)
Dept: FAMILY MEDICINE | Facility: CLINIC | Age: 57
End: 2022-11-02

## 2022-11-02 LAB — BACTERIA UR CULT: NORMAL

## 2022-11-02 NOTE — RESULT ENCOUNTER NOTE
Dear Valery,    Here is a summary of your recent test results:    Urine culture is abnormal but it looks like a contaminated, non clean-catch sample.  There is no need for antibiotics at this point.  If new, worsening, or persistent symptoms occur then you should call or return for a recheck.    For additional lab test information, labtestsonline.org is an excellent reference.    In addition, here is a list of due or overdue Health Maintenance reminders:    ANNUAL REVIEW OF HM ORDERS Never done  Discuss Advance Care Planning Never done  Hepatitis B Vaccine(1 of 3 - 3-dose series) Never done  HIV Screening Never done  Diptheria Tetanus Pertussis (DTAP/TDAP/TD) Vaccine(1 - Tdap) due on 12/04/2007  Mammogram due on 12/04/2019  Cholesterol Lab due on 12/08/2020  PAP Smear due on 12/15/2021  COVID-19 Vaccine(4 - Booster for Moderna series) due on 02/07/2022  Flu Vaccine(1) due on 09/01/2022  Yearly Preventive Visit due on 10/12/2022    Please call us at [phone] (or use [employer]) to address the above recommendations if needed.    Thank you for choosing  [facility].  It was an honor and a privilege to participate in your care.       Healthy regards,    Camila Pederson, THIERRY  [facility]

## 2022-11-03 ENCOUNTER — VIRTUAL VISIT (OUTPATIENT)
Dept: UROLOGY | Facility: CLINIC | Age: 57
End: 2022-11-03
Payer: COMMERCIAL

## 2022-11-03 DIAGNOSIS — Z87.440 HISTORY OF UTI: ICD-10-CM

## 2022-11-03 DIAGNOSIS — R35.0 URINARY FREQUENCY: ICD-10-CM

## 2022-11-03 DIAGNOSIS — N20.1 CALCULUS OF URETER: Primary | ICD-10-CM

## 2022-11-03 DIAGNOSIS — N28.89 CALIECTASIS: ICD-10-CM

## 2022-11-03 DIAGNOSIS — N20.0 CALCULUS OF KIDNEY: ICD-10-CM

## 2022-11-03 DIAGNOSIS — N20.0 KIDNEY STONE: ICD-10-CM

## 2022-11-03 PROCEDURE — 99203 OFFICE O/P NEW LOW 30 MIN: CPT | Mod: 95 | Performed by: PHYSICIAN ASSISTANT

## 2022-11-03 ASSESSMENT — PAIN SCALES - GENERAL: PAINLEVEL: MILD PAIN (3)

## 2022-11-03 NOTE — PROGRESS NOTES
Assessment/Plan:    Assessment & Plan   Khushi was seen today for new patient.    Diagnoses and all orders for this visit:    Calculus of ureter  -     CT Abdomen Pelvis w/o Contrast - LOW DOSE; Future  -     Patient Stated Goal: Pass my stone    Calculus of kidney    Urinary frequency    History of UTI    Stone Management Plan  Stone Management 11/3/2022   Current CT date 10/31/2022   Right sided stones? Yes   R Number of ureteral stones 1   R GSD of ureteral stones 5   R Location of ureteral stone Distal   R Number of kidney stones  1   R GSD of kidney stones 10 - 15   R Hydronephrosis None   R Stone Event New event   Diagnosis date 10/31/2022   Initial location of primary symptomatic stone Distal   Initial GSD of primary symptomatic stone 5   R Current Plan MET   MET 1 week F/U   Left sided stones? Yes   L Number of ureteral stones No ureteral stones   L Number of kidney stones  2   L GSD of kidney stones 4 - 10   L Hydronephrosis None   L Stone Event No current event   L Current Plan Observe   Observe rationale Limited stone burden with good prognosis for spontaneous passage         PLAN    56 yo pleasant F first time stone former with history of UTI's. Recently treated proteus UTI and newly diagnosed nonobstructing right distal ureteral stone, mild urinary frequency. Bilateral renal stones with dominant right renal stone.     Given she is afebrile and minimally symptomatic, will proceed with close follow up with medical expulsive therapy. Risks and benefits were detailed of medical expulsive therapy including probability of stone passage, recurrent renal colic, and requirement of emergency medical and/or surgical care and further imaging. Patient verbalized understanding. Patient agrees with plan as discussed. She will return in 1 week with low dose CT scan. Discussed surgical intervention if stone fails to pass or symptoms of fever re-emerge.    For symptom control, she was prescribed vicodin from PCP. Over  the counter symptom control medications of ibuprofen, Dramamine and Tylenol were recommended.    Video call duration: 22 minutes  Provider off-site  30 minutes spent on the date of the encounter doing chart review, history and exam, documentation and further activities per the note    Camelia Leon PA-C  [facility]    Subjective:     HPI  Ms. Khushi Sawant is a 57 year old  female who is being evaluated via a billable video visit by [facility] following [facility] ER visit for urolithiasis.    She is a first time unidentified composition stone former. She has no identified modifiable stone risk factors. She has identified non-modifiable stone risks including:  extensive family history.    She was treated for proteus UTI through E-visit with PCP clinic 10/17/22. She was seen by her PCP clinic 10/31/22 for acute onset, severe, cramping right lower abdominal pain starting one day prior to visit. She noted radiation of pain to left flank and pelvic region.  Significant associated symptoms at presentation included:  nausea, hematuria and dysuria. Further workup with labs and imaging revealed a nonobstructing right ureteral stone and bilateral renal stones without signs of infection or renal injury. She was prescribed flomax and Norco. She completed ciprofloxacin 10/23/22. She reports history of recurrent UTI's, with last occcurrence ~ 3 years ago.    She is feeling ok, has improved but ongoing urinary frequency. Has not seen a stone pass. Pertinent negative current symptoms include:  fever, chills, right flank pain, left flank pain, nausea, vomiting, hematuria and dysuria.     CT scan from 10/31/22 is personally reviewed and demonstrates a nonobstructing < 5 mm right distal ureteral stone. Additional 11 mm right mid-lower pole infundibular renal stone. Thera are 2 left upper pole renal stones, largest 5 mm.    Significant labs from  presentation include mild hematuria, no pyuria, negative nitrite, few bacteria, non-pathogenic organisms on urine culture, normal WBC, normal C reactive protein, normal creatinine and normal potassium.    ROS   A 12 point comprehensive review of systems is negative except for HPI    No past medical history on file.    Past Surgical History:   Procedure Laterality Date      SECTION       COLONOSCOPY N/A 2015    Procedure: COLONOSCOPY w/ placement of hemoclip x2;  Surgeon: Nena Anaya MD;  Location: [facility];  Service:      HYSTERECTOMY         Current Outpatient Medications   Medication Sig Dispense Refill     HYDROcodone-acetaminophen (NORCO) 5-325 MG tablet Take 1 tablet by mouth every 6 hours as needed for severe pain 10 tablet 0     tamsulosin (FLOMAX) 0.4 MG capsule Take 1 capsule (0.4 mg) by mouth daily 30 capsule 0       Allergies   Allergen Reactions     Effexor [Venlafaxine] Anxiety       Social History     Socioeconomic History     Marital status:      Spouse name: Not on file     Number of children: Not on file     Years of education: Not on file     Highest education level: Not on file   Occupational History     Not on file   Tobacco Use     Smoking status: Never     Smokeless tobacco: Never   Vaping Use     Vaping Use: Never used   Substance and Sexual Activity     Alcohol use: Yes     Comment: couple glasses of wine a week     Drug use: No     Sexual activity: Yes     Partners: Male     Birth control/protection: Female Surgical   Other Topics Concern     Parent/sibling w/ CABG, MI or angioplasty before 65F 55M? No   Social History Narrative     Not on file     Social Determinants of Health     Financial Resource Strain: Not on file   Food Insecurity: Not on file   Transportation Needs: Not on file   Physical Activity: Not on file   Stress: Not on file   Social Connections: Not on file   Intimate Partner Violence: Not on file   Housing Stability: Not on file        Family History   Problem Relation Age of Onset     Family History Negative Mother      Family History Negative Father        Objective:     No vitals or physical exam obtained due to virtual visit    LABS  Most Recent 3 CBC's:Recent Labs   Lab Test 10/31/22  1647 01/12/17  1043   WBC 6.8 5.1   HGB 15.1 15.5   MCV 99 94    173     Most Recent 3 BMP's:Recent Labs   Lab Test 10/31/22  1647 02/24/17  0841    140   POTASSIUM 4.1 3.8   CHLORIDE 101 104   CO2 26 27   BUN 16.5 21   CR 0.66 0.66   ANIONGAP 12 9   VIKTORIYA 9.9 9.3   * 109*     7-Day Micro Results     Collected Updated Procedure Result Status      10/31/2022 1520 11/02/2022 1405 Urine Culture Aerobic Bacterial - lab collect [[id number]]   Urine, Midstream    Final result Component Value   Culture 10,000-50,000 CFU/mL Mixture of urogenital albino               10/31/2022 1501 10/31/2022 1521 Wet prep - Clinic Collect [[id number]]   Swab from Vagina    Final result Component Value   Trichomonas Absent   Yeast Absent   Clue Cells Absent   WBCs/high power field None                Most Recent Urinalysis:Recent Labs   Lab Test 10/31/22  1426   COLOR Yellow   APPEARANCE Clear   URINEGLC Negative   URINEBILI Negative   URINEKETONE Negative   SG 1.010   UBLD Small*   URINEPH 5.5   PROTEIN Negative   UROBILINOGEN 0.2   NITRITE Negative   LEUKEST Negative   RBCU 5-10*   WBCU None Seen     Most Recent ESR & CRP:Recent Labs   Lab Test 10/31/22  1647   SED 16   CRP <3.00     Acute Labs   Urine Culture    Culture   Date Value Ref Range Status   10/31/2022 10,000-50,000 CFU/mL Mixture of urogenital albino  Final   10/18/2022 >100,000 CFU/mL Proteus mirabilis (A)  Final

## 2022-11-03 NOTE — PROGRESS NOTES
Patient is roomed via telephone for a virtual visit.  Patient confirmed she is in the [facility] at the time of this appointment.  Patient understands that this visit is billable and agree to proceed with appointment.

## 2022-11-03 NOTE — PATIENT INSTRUCTIONS
Patient Stated Goal: Pass my stone  Symptom Control While Passing A Stone    The goal of [facility] is to let a smaller kidney stone (less than 4 to 5 mm) pass without intervention if possible. Giving your body a chance to clear the stone may take a few hours up to a few weeks.  Keeping you well-informed, safe and fairly comfortable is important.    Drink to thirst  Do not attempt to  flush out  your stone by drinking too much fluid. This does not work and may increase nausea. Drink enough to satisfy your body s thirst. Eating your normal diet is fine.   Medications (that may be suggested or prescribed)    Ibuprofen (Advil or Motrin) Available over the counter  o Take two (200mg) tablets every six hours until the stone passes.  o Prevents spasm of the ureter.    o Decreases pain.      Dramamine* (drowsy version, non-generic formulation) Available over the counter  o Take 50mg at bedtime  o Decreases spasms of the ureter  o Decreases nausea  o Decreases acute pain  o Decreases recurrence of pain for 24 hours  o Will help you sleep  *This medication will cause increase drowsiness, do not drive or operate machinery for 6 hours.      Narcotics (Percocet, Vicodin, Dilaudid) Take as prescribed for severe pain unrelieved by ibuprofen and Dramamine  o Narcotics have significant side effects and only  cover-up  pain. They have no effect on the cause of pain.  o Common side effects  - Confusion, disorientation and sedation - DO NOT DRIVE OR OPERATE MACHINERY WITHIN 24 HOURS  - Nausea - take Dramamine or Zofran or Haldol to help control  - Constipation  - Sleep disturbances      Ondansetron (Zofran) Take as prescribed  o Reserve for severe nausea  o May cause constipation, start over the counter Miralax if needed      Second Line Anti-Nausea Medication: Adding a different anti-nausea medication maybe helpful for persistent nausea.  The combined effect of different types of anti-nausea medications maybe more  effective than either medication by itself, even in higher doses.  o Compazine: Take as prescribed      Information about kidney stones    Crystals can form if chemicals are too concentrated in your urine. If the crystal grows over time, a stone may form. A stone usually isn t painful while it is still in the kidney.    As the stone begins to leave the kidney, you may experience episodes of flank pain as the kidney stone approaches the entrance to the ureter. Some people feel a vague ache in the side.    Kidney stones may fall into the ureter. Some stones are tiny and pass through without causing symptoms. The ureter is a small tube (approximately 1/8 of an inch wide). A kidney stone can get stuck and block the ureter. If this happens, urine backs up and flows back to the kidney. Back pressure on the kidney can cause:  o Severe flank pain radiating to the groin.  o Severe nausea and vomiting.  o The pain can occur in the lower back, side, groin or all three.      When the stone reaches the lower ureter, this can irritate the bladder and sensations of feeling the urge to urinate frequently and urgently may occur.      Once the stone passes out of your ureter and into your bladder, the symptoms of urgency and frequency will often disappear. Sometimes pain will come back for a short period and will not be as severe as before. The passage of the stone from your bladder and out of your body is usually not a problem. The urethra is at least twice as wide as the normal ureter, so the stone doesn t usually block it.    Strain all urine  If you pass the stone, save it. Place it in the container we have provided and bring it to the [facility] within a week of passing it. Your stone will then be sent for analysis which takes about a month.     Signs and symptoms you might experience    Nausea    Decreased appetite    Urinary frequency    Bloody urine     Chills    Fatigue    When to call [facility] or  go to the Emergency Room    Fever with a temperature greater than 100.1    Severe pain    Persistent nausea/vomiting    If the pain worsens or nausea/vomiting is uncontrolled with medications, STOP eating & drinking. You need to have an empty stomach for 8 hours prior to surgery. Call the [facility] immediately at [phone].           Follow-up    Low dose CT scan with doctor visit 1-2 weeks after initial clinic visit per doctor s instructions    Please cancel the CT scan visit if you pass a stone. Reschedule for a one month follow-up with doctor to discuss stone composition and future prevention.    Preventing future stones    Approximately a month after your stone is sent out for analysis, a prevention visit will occur with your provider, to discuss an individualized plan for prevention of new stones and to discuss managing stones that you may still have. Along with the analysis of the kidney stone, blood and urine tests may be indicated to develop this plan. Knowing the type of kidney stones you make, and why, allows the providers at the [facility] to recommend specific ways to prevent them.    Follow-up visits are an important part of monitoring and preventing future re-occurrences.    The [facility] is available for questions or concerns 24 hours a day at [phone]

## 2022-11-08 ENCOUNTER — LAB (OUTPATIENT)
Dept: LAB | Facility: CLINIC | Age: 57
End: 2022-11-08
Payer: COMMERCIAL

## 2022-11-08 ENCOUNTER — TELEPHONE (OUTPATIENT)
Dept: UROLOGY | Facility: CLINIC | Age: 57
End: 2022-11-08

## 2022-11-08 DIAGNOSIS — N20.1 CALCULUS OF URETER: ICD-10-CM

## 2022-11-08 DIAGNOSIS — N20.1 CALCULUS OF URETER: Primary | ICD-10-CM

## 2022-11-08 LAB
ALBUMIN UR-MCNC: NEGATIVE MG/DL
APPEARANCE UR: CLEAR
BACTERIA #/AREA URNS HPF: ABNORMAL /HPF
BILIRUB UR QL STRIP: NEGATIVE
COLOR UR AUTO: YELLOW
GLUCOSE UR STRIP-MCNC: NEGATIVE MG/DL
HGB UR QL STRIP: ABNORMAL
KETONES UR STRIP-MCNC: 15 MG/DL
LEUKOCYTE ESTERASE UR QL STRIP: ABNORMAL
NITRATE UR QL: NEGATIVE
PH UR STRIP: 6 [PH] (ref 5–7)
RBC #/AREA URNS AUTO: ABNORMAL /HPF
SP GR UR STRIP: 1.01 (ref 1–1.03)
SQUAMOUS #/AREA URNS AUTO: ABNORMAL /LPF
UROBILINOGEN UR STRIP-ACNC: 0.2 E.U./DL
WBC #/AREA URNS AUTO: ABNORMAL /HPF
WBC CLUMPS #/AREA URNS HPF: PRESENT /HPF

## 2022-11-08 PROCEDURE — 87086 URINE CULTURE/COLONY COUNT: CPT

## 2022-11-08 PROCEDURE — 81001 URINALYSIS AUTO W/SCOPE: CPT

## 2022-11-08 NOTE — TELEPHONE ENCOUNTER
Spoke with patient who will drop off a urine at [facility] lab to rule out infection.    Valery Cam RN

## 2022-11-08 NOTE — TELEPHONE ENCOUNTER
[facility] Call Center    Phone Message    May a detailed message be left on voicemail: yes     Reason for Call: Symptoms or Concerns     If patient has red-flag symptoms, warm transfer to triage line    Current symptom or concern: Patient thinks she may be passing a stone soon but would like to discuss symptoms to make sure everything is normal. No current pain but really uncomfortable burning, urgency, and a slightly elevated temp.     Symptoms have been present for:  3 day(s)    Has patient previously been seen for this? Yes    By Camelia Leon    Date: 11/3/22    Are there any new or worsening symptoms? Yes: Increasing burning and urgency      Action Taken: Message routed to:  [facility]    Travel Screening: Not Applicable

## 2022-11-08 NOTE — RESULT ENCOUNTER NOTE
Please sent letter.     Dear Valery,    Here is a summary of your recent test results:    Mammogram was normal.  ADVISE: rechecking in 1 year.    In addition, here is a list of due or overdue Health Maintenance reminders:    ANNUAL REVIEW OF HM ORDERS Never done  Discuss Advance Care Planning Never done  Hepatitis B Vaccine(1 of 3 - 3-dose series) Never done  HIV Screening Never done  Diptheria Tetanus Pertussis (DTAP/TDAP/TD) Vaccine(1 - Tdap) due on 12/04/2007  Cholesterol Lab due on 12/08/2020  PAP Smear due on 12/15/2021  COVID-19 Vaccine(4 - Booster for Moderna series) due on 02/07/2022  Flu Vaccine(1) due on 09/01/2022  Yearly Preventive Visit due on 10/12/2022    Please call us at [phone] (or use [facility]) to address the above recommendations or have any questions.    Thank you for choosing [facility].  It was an honor and a privilege to participate in your care today.     Healthy regards,    THIERRY Servin

## 2022-11-09 ENCOUNTER — ANCILLARY PROCEDURE (OUTPATIENT)
Dept: CT IMAGING | Facility: CLINIC | Age: 57
End: 2022-11-09
Attending: PHYSICIAN ASSISTANT
Payer: COMMERCIAL

## 2022-11-09 DIAGNOSIS — N20.1 CALCULUS OF URETER: ICD-10-CM

## 2022-11-09 LAB — BACTERIA UR CULT: NO GROWTH

## 2022-11-09 PROCEDURE — 74176 CT ABD & PELVIS W/O CONTRAST: CPT

## 2022-11-10 ENCOUNTER — VIRTUAL VISIT (OUTPATIENT)
Dept: UROLOGY | Facility: CLINIC | Age: 57
End: 2022-11-10
Payer: COMMERCIAL

## 2022-11-10 DIAGNOSIS — N20.1 CALCULUS OF URETER: Primary | ICD-10-CM

## 2022-11-10 DIAGNOSIS — N20.0 CALCULUS OF KIDNEY: ICD-10-CM

## 2022-11-10 PROCEDURE — 99213 OFFICE O/P EST LOW 20 MIN: CPT | Mod: 95 | Performed by: UROLOGY

## 2022-11-10 RX ORDER — KETOROLAC TROMETHAMINE 30 MG/ML
15 INJECTION, SOLUTION INTRAMUSCULAR; INTRAVENOUS
Status: CANCELLED | OUTPATIENT
Start: 2022-11-10

## 2022-11-10 RX ORDER — GABAPENTIN 100 MG/1
300 CAPSULE ORAL
Status: CANCELLED | OUTPATIENT
Start: 2022-11-10

## 2022-11-10 RX ORDER — ACETAMINOPHEN 500 MG
1000 TABLET ORAL
Status: CANCELLED | OUTPATIENT
Start: 2022-11-10

## 2022-11-10 ASSESSMENT — PAIN SCALES - GENERAL: PAINLEVEL: MILD PAIN (2)

## 2022-11-10 NOTE — TELEPHONE ENCOUNTER
Melophone message sent. Patient states she is feeling better.     Emi Soriano RN [place] T[facility]

## 2022-11-10 NOTE — PROGRESS NOTES
Patient is roomed via telephone for a virtual visit.  Patient confirmed she is in the [facility] at the time of this appointment.  Patient understands that this visit is billable and agree to proceed with appointment.

## 2022-11-10 NOTE — PATIENT INSTRUCTIONS
Patient Stated Goal: Know what to expect after surgery  Ureteroscopy    Ureteroscopy is a procedure which is done for clearance of stones from the ureter, kidney or both. There are no incisions involved. The procedure involves your surgeon placing a small scope into your urethra. This is the opening where urine leaves your body.  The surgeon watches as they carefully guide the scope to the stone(s).  Modern flexible ureteroscopes can be used to reach virtually any location within the urinary tract.     The size, shape and location of the stone determines how best to treat the stone(s).  Whenever possible, stones are removed in one piece.  Larger stones need to be broken using a laser before removing in smaller pieces.  The goal is to remove all stones and stone fragments from that side of the body in a single treatment.  Complete stone clearance is an important step to prevent future kidney stone episodes.    Surgery:    Same day outpatient procedure    30-60 minutes    Procedure done in hospital surgical suite    General anesthesia (you will be asleep during the procedure)     Antibiotic prior to surgery to prevent infection    Physician will visit with you and respond to any questions or concerns and consent will be signed prior to going to the operating room    Risks:    Infection - Preoperative antibiotics should prevent new infections but it is possible that unanticipated bacteria may be introduced at time of surgery or that the stones were actually chronically infected before surgery      Injury - The ureter may be injured during this procedure.  This is most likely to happen if the ureter was very inflamed before surgery or if a stone is very tightly impacted.  The surgeon will not aggressively treat a stone if this creates a risk of injury.        Inaccessible Stones -A single procedure is effective in 95% of cases, but if your ureter is very narrow or your kidney stone is very impacted, a stent will be  placed and the procedure stopped.  In 1-2 weeks after the ureter has relaxed, the patient will be brought back to surgery and the procedure can be safely performed.      Incomplete stone clearance -Occasionally stone or stone fragments may not be completely cleared.  These may pass on their own, which may cause discomfort.  Our goal is to remove all possible stones and fragments.    Stent:      An internal soft tube will be placed between the kidney and the bladder while in surgery (after the stone is cleared). The stent will keep the kidney draining.    What should I expect?     It is common for a stent to cause some irritation and discomfort.   You may have:      The need to urinate suddenly     The need to urinate often     Pain during urination     A dull backache, which may get worse during urination     Blood stained urine (like fruit punch) and occasional small clots    It s important to remember the stent is necessary and only temporary. To feel more comfortable:      Drink more than you normally would but you do not have to constantly  flush your kidneys     Limiting your activity may decrease irritation or bleeding    Ibuprofen - 2 tablets every 6-8 hours     Use pain medications as directed.    When is the stent removed?    Most stents are removed within 5 days to 2 weeks after a procedure.     How is the stent removed?     Your stent will be removed in the [facility] with a small telescope and a grasping tool.  It usually takes less than 1 minute to remove the stent.    What should I expect after the stent is removed?     You should feel normal by the next day    Some patients find:    An increase in back pain about an hour after the stent is removed as the kidney fills up with urine before it starts to empty.  It can be as uncomfortable as your initial stone episode.  Taking pain medications before stent removal may be helpful, but you would need someone else to drive you to and from your  appointment.    Bladder symptoms usually disappear by the next morning.    Small amounts of blood in the urine may be seen occasionally for up to a week.    Diet:      After surgery, there are no dietary restrictions - Drink to thirst, there is no need to increase intake of fluids, as this may increase nausea symptoms. Try to eat smaller, more frequent snacks, instead of large meals.    Activity:    Many people return to work within 1-2 days. Fatigue is normal for a couple of weeks following surgery. With increased activity you may experience more discomfort and you may notice more blood in your urine.      Post-Operative Symptom Control    While you recover from your procedure, you can take steps to ease your recovery.    Medications that prevent further episodes of severe pain and help stones pass: Take these as prescribed on a regular basis even if you are NOT in pain      Ibuprofen (Advil or Motrin) - Is available over the counter Take 2 (200mg) tablets every 6 hours until the stone passes.  o prevents spasm of the ureter.    o Decreases pain      Dramamine - (drowsy version, non-generic formulation) Is available over the counter and decreases spasm of the ureter.  Take 50mg at bedtime every night until the stone passes. In addition, take every 6 hours as needed.  Dramamine:  o Decreases nausea  o Decreases acute pain  o Decreases recurrence of pain for next 24 hours  o Will help you sleep        *This medication will cause increased drowsiness, do not drive or operate machinery for 6 hours      Flomax- Studies show that Flomax decreases irritation from stents.   o Take every day with food until stone passes even if you do not have pain  o Flomax does not relieve pain.        *This medication may cause nasal congestion or light-headedness      Detrol ( Tolterodine) - After surgery Detrol may decrease stent irritation and pelvic pain  o Take as prescribed     *This medication may cause dry mouth, constipation or  blurry vision. Stop medication if unable to urinate.    Medication that are taken as needed to manage break through symptoms: Take these ONLY as required and hopefully not at all      Narcotics (Percocet, Vicodin, Dilaudid)- take as prescribed for severe pain unrelieved by ibuprofen and dramamine  o Take as prescribed for severe pain  o Narcotics have significant side effects and only  cover-up  pain. They have no effect on cause of pain.  o Common side effects:  - Confusion, disorientation and sedation - DO NOT DRIVE OR OPERATE MACHINERY WITHIN 24 HOURS  - Nausea - take Dramamine or Zofran  or Haldol to help control  - Constipation  - Sleep disturbances      Ondansetron (Zofran)-  o Take as prescribed  o Reserve for severe nausea  o May cause constipation, start over the counter Miralax if needed to treat this    Haldol-  o Take as prescribed  o Reserve for severe nausea    Warning Signs/Symptoms - Please call the [facility] 24 hours a day at [phone] IMMEDIATELY if you experience any of these:    Fever greater than 100.1     Chills    Pain NOT CONTROLLED by pain medications    Heavy bleeding or large clots in urine (small clots can be normal)    Persistent nausea and/or vomiting    Post-Operative Follow up:    The stone(s) will be sent from surgery to a lab for composition analysis.  These results are usually available before a one month post-operative visit.  If you had laser treatment to break up your stone, you will usually be scheduled for a low dose CT scan prior to your one month appointment.  This scan allows your surgeon to confirm that all stone fragments were cleared at time of surgery and that there have been no complications.  These results along with possible labs and urine studies will help us develop an individualized plan to prevent new stones from forming and keep existing stones from enlarging.  This visit is usually scheduled about 1 month after your original surgery.    The  [facility] can respond to your questions or concerns 24 hours a day at [phone].

## 2022-11-11 ENCOUNTER — MYC MEDICAL ADVICE (OUTPATIENT)
Dept: UROLOGY | Facility: CLINIC | Age: 57
End: 2022-11-11

## 2022-11-11 NOTE — TELEPHONE ENCOUNTER
Spoke with patient regarding her surgery questions.  She would like to proceed with the ureteroscopy.  Valery Cam RN

## 2022-11-29 RX ORDER — LACTOBACILLUS RHAMNOSUS GG 10B CELL
1 CAPSULE ORAL 2 TIMES DAILY
COMMUNITY

## 2022-11-29 RX ORDER — PHENAZOPYRIDINE HYDROCHLORIDE 95 MG/1
190 TABLET ORAL 3 TIMES DAILY
COMMUNITY
End: 2022-12-07

## 2022-11-29 ASSESSMENT — ENCOUNTER SYMPTOMS
CONSTIPATION: 0
ARTHRALGIAS: 0
FREQUENCY: 0
HEADACHES: 0
BREAST MASS: 0
NERVOUS/ANXIOUS: 0
COUGH: 0
SHORTNESS OF BREATH: 0
CHILLS: 0
HEARTBURN: 0
EYE PAIN: 0
DIARRHEA: 0
HEMATURIA: 0
MYALGIAS: 0
ABDOMINAL PAIN: 0
HEMATOCHEZIA: 0
DYSURIA: 0
SORE THROAT: 0
NAUSEA: 0
PARESTHESIAS: 0
DIZZINESS: 0
FEVER: 0
WEAKNESS: 0
JOINT SWELLING: 0
PALPITATIONS: 0

## 2022-11-30 ENCOUNTER — OFFICE VISIT (OUTPATIENT)
Dept: FAMILY MEDICINE | Facility: CLINIC | Age: 57
End: 2022-11-30
Payer: COMMERCIAL

## 2022-11-30 VITALS
TEMPERATURE: 97 F | HEIGHT: 67 IN | OXYGEN SATURATION: 98 % | BODY MASS INDEX: 26.37 KG/M2 | SYSTOLIC BLOOD PRESSURE: 136 MMHG | HEART RATE: 92 BPM | DIASTOLIC BLOOD PRESSURE: 88 MMHG | WEIGHT: 168 LBS

## 2022-11-30 DIAGNOSIS — Z01.818 PREOP GENERAL PHYSICAL EXAM: Primary | ICD-10-CM

## 2022-11-30 DIAGNOSIS — Z23 NEED FOR TDAP VACCINATION: ICD-10-CM

## 2022-11-30 DIAGNOSIS — N28.89 CALIECTASIS: ICD-10-CM

## 2022-11-30 DIAGNOSIS — N20.0 KIDNEY STONE: ICD-10-CM

## 2022-11-30 LAB
ANION GAP SERPL CALCULATED.3IONS-SCNC: 13 MMOL/L (ref 7–15)
BUN SERPL-MCNC: 17.6 MG/DL (ref 6–20)
CALCIUM SERPL-MCNC: 9.7 MG/DL (ref 8.6–10)
CHLORIDE SERPL-SCNC: 103 MMOL/L (ref 98–107)
CREAT SERPL-MCNC: 0.61 MG/DL (ref 0.51–0.95)
DEPRECATED HCO3 PLAS-SCNC: 23 MMOL/L (ref 22–29)
ERYTHROCYTE [DISTWIDTH] IN BLOOD BY AUTOMATED COUNT: 12.3 % (ref 10–15)
GFR SERPL CREATININE-BSD FRML MDRD: >90 ML/MIN/1.73M2
GLUCOSE SERPL-MCNC: 108 MG/DL (ref 70–99)
HCT VFR BLD AUTO: 44 % (ref 35–47)
HGB BLD-MCNC: 14.7 G/DL (ref 11.7–15.7)
MCH RBC QN AUTO: 31.1 PG (ref 26.5–33)
MCHC RBC AUTO-ENTMCNC: 33.4 G/DL (ref 31.5–36.5)
MCV RBC AUTO: 93 FL (ref 78–100)
PLATELET # BLD AUTO: 189 10E3/UL (ref 150–450)
POTASSIUM SERPL-SCNC: 4.2 MMOL/L (ref 3.4–5.3)
RBC # BLD AUTO: 4.73 10E6/UL (ref 3.8–5.2)
SODIUM SERPL-SCNC: 139 MMOL/L (ref 136–145)
WBC # BLD AUTO: 4.9 10E3/UL (ref 4–11)

## 2022-11-30 PROCEDURE — 93000 ELECTROCARDIOGRAM COMPLETE: CPT | Performed by: PHYSICIAN ASSISTANT

## 2022-11-30 PROCEDURE — 90715 TDAP VACCINE 7 YRS/> IM: CPT | Performed by: PHYSICIAN ASSISTANT

## 2022-11-30 PROCEDURE — 36415 COLL VENOUS BLD VENIPUNCTURE: CPT | Performed by: PHYSICIAN ASSISTANT

## 2022-11-30 PROCEDURE — 85027 COMPLETE CBC AUTOMATED: CPT | Performed by: PHYSICIAN ASSISTANT

## 2022-11-30 PROCEDURE — 90471 IMMUNIZATION ADMIN: CPT | Performed by: PHYSICIAN ASSISTANT

## 2022-11-30 PROCEDURE — 80048 BASIC METABOLIC PNL TOTAL CA: CPT | Performed by: PHYSICIAN ASSISTANT

## 2022-11-30 PROCEDURE — 99214 OFFICE O/P EST MOD 30 MIN: CPT | Mod: 25 | Performed by: PHYSICIAN ASSISTANT

## 2022-11-30 NOTE — H&P (VIEW-ONLY)
[facility]  [address] [place] [id number]  Phone: [phone]  Primary Provider: Camila Pederson  Pre-op Performing Provider: ELLYN BLANCO      PREOPERATIVE EVALUATION:  Today's date: 11/30/2022    Khushi Sawant is a 57 year old female who presents for a preoperative evaluation.    Surgical Information:  Surgery/Procedure: cystourethroscopy with laser lithotripsy calculus removal  Surgery Location: [facility]  Surgeon: Dr Mckeon  Surgery Date: 12/2/22  Time of Surgery: 7am  Where patient plans to recover: At home with family  Fax number for surgical facility: Note does not need to be faxed, will be available electronically in Epic.    Type of Anesthesia Anticipated: General    Assessment & Plan     The proposed surgical procedure is considered INTERMEDIATE risk.    Preop general physical exam  Caliectasis  Kidney stone  Cleared for surgical procedure without further diagnostics  - Stone analysis  - EKG 12-lead complete w/read - Clinics  - Basic metabolic panel  (Ca, Cl, CO2, Creat, Gluc, K, Na, BUN)  - CBC with platelets    Need for Tdap vaccination  Vaccinated today  - TDAP VACCINE (Adacel, Boostrix)      Risks and Recommendations:  The patient has the following additional risks and recommendations for perioperative complications:   - No identified additional risk factors other than previously addressed    Medication Instructions:    For 7 days prior to procedure: Hold all vitamins/supplements.  Hold all NSAID medications (ibuprofen/motrin/aleve) and aspirin.  Tylenol products OK.      RECOMMENDATION:  APPROVAL GIVEN to proceed with proposed procedure, without further diagnostic evaluation.      Ellyn Blanco MBA, MS, PA-C  [facility]        Subjective     HPI related to upcoming procedure:     Recent CT scan was personally reviewed and demonstrates progression of stone to extreme distal right ureter (possibly in  bladder) with no hydronephrosis. I suspect that the right distal stone will pass if it has not passd already.   Advised to have the large right renal stone cleared on a scheduled basis in the not too distant future. Possibly associated with proteus infection. Most recent voided urine no growth.    Preop Questions 11/25/2022   1. Have you ever had a heart attack or stroke? No   2. Have you ever had surgery on your heart or blood vessels, such as a stent placement, a coronary artery bypass, or surgery on an artery in your head, neck, heart, or legs? No   3. Do you have chest pain with activity? No   4. Do you have a history of  heart failure? No   5. Do you currently have a cold, bronchitis or symptoms of other infection? No   6. Do you have a cough, shortness of breath, or wheezing? No   7. Do you or anyone in your family have previous history of blood clots? No   8. Do you or does anyone in your family have a serious bleeding problem such as prolonged bleeding following surgeries or cuts? No   9. Have you ever had problems with anemia or been told to take iron pills? No   10. Have you had any abnormal blood loss such as black, tarry or bloody stools, or abnormal vaginal bleeding? No   11. Have you ever had a blood transfusion? No   12. Are you willing to have a blood transfusion if it is medically needed before, during, or after your surgery? Yes   13. Have you or any of your relatives ever had problems with anesthesia? No   14. Do you have sleep apnea, excessive snoring or daytime drowsiness? No   15. Do you have any artifical heart valves or other implanted medical devices like a pacemaker, defibrillator, or continuous glucose monitor? No   16. Do you have artificial joints? No   17. Are you allergic to latex? No   18. Is there any chance that you may be pregnant? No       Health Care Directive:  Patient does not have a Health Care Directive or Living Will: Discussed advance care planning with patient; information  given to patient to review.    Preoperative Review of :   reviewed - controlled substances reflected in medication list.      Status of Chronic Conditions:  See problem list for active medical problems.  Problems all longstanding and stable, except as noted/documented.  See ROS for pertinent symptoms related to these conditions.      Review of Systems  CONSTITUTIONAL: NEGATIVE for fever, chills, change in weight  INTEGUMENTARY/SKIN: NEGATIVE for worrisome rashes, moles or lesions  EYES: NEGATIVE for vision changes or irritation  ENT/MOUTH: NEGATIVE for ear, mouth and throat problems  RESP: NEGATIVE for significant cough or SOB  CV: NEGATIVE for chest pain, palpitations or peripheral edema  GI: NEGATIVE for nausea, abdominal pain, heartburn, or change in bowel habits  : NEGATIVE for frequency, dysuria, or hematuria  MUSCULOSKELETAL: NEGATIVE for significant arthralgias or myalgia  NEURO: NEGATIVE for weakness, dizziness or paresthesias  ENDOCRINE: NEGATIVE for temperature intolerance, skin/hair changes  HEME: NEGATIVE for bleeding problems  PSYCHIATRIC: NEGATIVE for changes in mood or affect    Patient Active Problem List    Diagnosis Date Noted     Sessile colonic polyp 2015 -  normal 2018 [facility] - 5 year f/u recommended 10/31/2022     Priority: Medium     S/P laparoscopic supracervical hysterectomy 10/31/2022     Priority: Medium     History of Clostridioides difficile colitis 10/31/2022     Priority: Medium     Kidney stone 10/31/2022     Priority: Medium     Caliectasis 10/31/2022     Priority: Medium      History reviewed. No pertinent past medical history.  Past Surgical History:   Procedure Laterality Date      SECTION       COLONOSCOPY N/A 2015    Procedure: COLONOSCOPY w/ placement of hemoclip x2;  Surgeon: Nena Anaya MD;  Location: [facility];  Service:      HYSTERECTOMY      for menorrhagia     Current Outpatient Medications   Medication Sig Dispense Refill      "lactobacillus rhamnosus (GG) (CULTURELL) capsule Take 1 capsule by mouth 2 times daily       phenazopyridine (AZO URINARY PAIN RELIEF) 95 MG tablet Take 190 mg by mouth 3 times daily         Allergies   Allergen Reactions     Effexor [Venlafaxine] Anxiety        Social History     Tobacco Use     Smoking status: Never     Smokeless tobacco: Never   Substance Use Topics     Alcohol use: Yes     Comment: couple glasses of wine a week     Family History   Problem Relation Age of Onset     Family History Negative Mother      Family History Negative Father      History   Drug Use No         Objective     /88   Pulse 92   Temp 97  F (36.1  C)   Ht 1.689 m (5' 6.5\")   Wt 76.2 kg (168 lb)   SpO2 98%   BMI 26.71 kg/m      Physical Exam    GENERAL APPEARANCE: healthy, alert and no distress     EYES: EOMI, PERRL     HENT: ear canals and TM's normal and nose and mouth without ulcers or lesions     NECK: no adenopathy, no asymmetry, masses, or scars and thyroid normal to palpation     RESP: lungs clear to auscultation - no rales, rhonchi or wheezes     CV: regular rates and rhythm, normal S1 S2, no S3 or S4 and no murmur, click or rub     ABDOMEN:  soft, nontender, no HSM or masses and bowel sounds normal     MS: extremities normal- no gross deformities noted, no evidence of inflammation in joints, FROM in all extremities.     SKIN: no suspicious lesions or rashes     NEURO: Normal strength and tone, sensory exam grossly normal, mentation intact and speech normal     PSYCH: mentation appears normal. and affect normal/bright     LYMPHATICS: No cervical adenopathy    Recent Labs   Lab Test 10/31/22  1647   HGB 15.1         POTASSIUM 4.1   CR 0.66        Diagnostics:  Results for orders placed or performed in visit on 11/30/22   Basic metabolic panel  (Ca, Cl, CO2, Creat, Gluc, K, Na, BUN)     Status: Abnormal   Result Value Ref Range    Sodium 139 136 - 145 mmol/L    Potassium 4.2 3.4 - 5.3 mmol/L    " Chloride 103 98 - 107 mmol/L    Carbon Dioxide (CO2) 23 22 - 29 mmol/L    Anion Gap 13 7 - 15 mmol/L    Urea Nitrogen 17.6 6.0 - 20.0 mg/dL    Creatinine 0.61 0.51 - 0.95 mg/dL    Calcium 9.7 8.6 - 10.0 mg/dL    Glucose 108 (H) 70 - 99 mg/dL    GFR Estimate >90 >60 mL/min/1.73m2   CBC with platelets     Status: Normal   Result Value Ref Range    WBC Count 4.9 4.0 - 11.0 10e3/uL    RBC Count 4.73 3.80 - 5.20 10e6/uL    Hemoglobin 14.7 11.7 - 15.7 g/dL    Hematocrit 44.0 35.0 - 47.0 %    MCV 93 78 - 100 fL    MCH 31.1 26.5 - 33.0 pg    MCHC 33.4 31.5 - 36.5 g/dL    RDW 12.3 10.0 - 15.0 %    Platelet Count 189 150 - 450 10e3/uL     EKG: appears normal, NSR, normal axis, normal intervals, no acute ST/T changes c/w ischemia, no LVH by voltage criteria, there are no prior tracings available    Revised Cardiac Risk Index (RCRI):  The patient has the following serious cardiovascular risks for perioperative complications:   - No serious cardiac risks = 0 points     RCRI Interpretation: 0 points: Class I (very low risk - 0.4% complication rate)           Signed Electronically by: Ellyn Blanco PA-C  Copy of this evaluation report is provided to requesting physician.

## 2022-11-30 NOTE — PROGRESS NOTES
[facility]  [address] [place] [id number]  Phone: [phone]  Primary Provider: Camila Pederson  Pre-op Performing Provider: ELLYN BLANCO      PREOPERATIVE EVALUATION:  Today's date: 11/30/2022    Khushi Sawant is a 57 year old female who presents for a preoperative evaluation.    Surgical Information:  Surgery/Procedure: cystourethroscopy with laser lithotripsy calculus removal  Surgery Location: [facility]  Surgeon: Dr Mckeon  Surgery Date: 12/2/22  Time of Surgery: 7am  Where patient plans to recover: At home with family  Fax number for surgical facility: Note does not need to be faxed, will be available electronically in Epic.    Type of Anesthesia Anticipated: General    Assessment & Plan     The proposed surgical procedure is considered INTERMEDIATE risk.    Preop general physical exam  Caliectasis  Kidney stone  Cleared for surgical procedure without further diagnostics  - Stone analysis  - EKG 12-lead complete w/read - Clinics  - Basic metabolic panel  (Ca, Cl, CO2, Creat, Gluc, K, Na, BUN)  - CBC with platelets    Need for Tdap vaccination  Vaccinated today  - TDAP VACCINE (Adacel, Boostrix)      Risks and Recommendations:  The patient has the following additional risks and recommendations for perioperative complications:   - No identified additional risk factors other than previously addressed    Medication Instructions:    For 7 days prior to procedure: Hold all vitamins/supplements.  Hold all NSAID medications (ibuprofen/motrin/aleve) and aspirin.  Tylenol products OK.      RECOMMENDATION:  APPROVAL GIVEN to proceed with proposed procedure, without further diagnostic evaluation.      Ellyn Blanco MBA, MS, PA-C  [facility]        Subjective     HPI related to upcoming procedure:     Recent CT scan was personally reviewed and demonstrates progression of stone to extreme distal right ureter (possibly in  bladder) with no hydronephrosis. I suspect that the right distal stone will pass if it has not passd already.   Advised to have the large right renal stone cleared on a scheduled basis in the not too distant future. Possibly associated with proteus infection. Most recent voided urine no growth.    Preop Questions 11/25/2022   1. Have you ever had a heart attack or stroke? No   2. Have you ever had surgery on your heart or blood vessels, such as a stent placement, a coronary artery bypass, or surgery on an artery in your head, neck, heart, or legs? No   3. Do you have chest pain with activity? No   4. Do you have a history of  heart failure? No   5. Do you currently have a cold, bronchitis or symptoms of other infection? No   6. Do you have a cough, shortness of breath, or wheezing? No   7. Do you or anyone in your family have previous history of blood clots? No   8. Do you or does anyone in your family have a serious bleeding problem such as prolonged bleeding following surgeries or cuts? No   9. Have you ever had problems with anemia or been told to take iron pills? No   10. Have you had any abnormal blood loss such as black, tarry or bloody stools, or abnormal vaginal bleeding? No   11. Have you ever had a blood transfusion? No   12. Are you willing to have a blood transfusion if it is medically needed before, during, or after your surgery? Yes   13. Have you or any of your relatives ever had problems with anesthesia? No   14. Do you have sleep apnea, excessive snoring or daytime drowsiness? No   15. Do you have any artifical heart valves or other implanted medical devices like a pacemaker, defibrillator, or continuous glucose monitor? No   16. Do you have artificial joints? No   17. Are you allergic to latex? No   18. Is there any chance that you may be pregnant? No       Health Care Directive:  Patient does not have a Health Care Directive or Living Will: Discussed advance care planning with patient; information  given to patient to review.    Preoperative Review of :   reviewed - controlled substances reflected in medication list.      Status of Chronic Conditions:  See problem list for active medical problems.  Problems all longstanding and stable, except as noted/documented.  See ROS for pertinent symptoms related to these conditions.      Review of Systems  CONSTITUTIONAL: NEGATIVE for fever, chills, change in weight  INTEGUMENTARY/SKIN: NEGATIVE for worrisome rashes, moles or lesions  EYES: NEGATIVE for vision changes or irritation  ENT/MOUTH: NEGATIVE for ear, mouth and throat problems  RESP: NEGATIVE for significant cough or SOB  CV: NEGATIVE for chest pain, palpitations or peripheral edema  GI: NEGATIVE for nausea, abdominal pain, heartburn, or change in bowel habits  : NEGATIVE for frequency, dysuria, or hematuria  MUSCULOSKELETAL: NEGATIVE for significant arthralgias or myalgia  NEURO: NEGATIVE for weakness, dizziness or paresthesias  ENDOCRINE: NEGATIVE for temperature intolerance, skin/hair changes  HEME: NEGATIVE for bleeding problems  PSYCHIATRIC: NEGATIVE for changes in mood or affect    Patient Active Problem List    Diagnosis Date Noted     Sessile colonic polyp 2015 -  normal 2018 [facility] - 5 year f/u recommended 10/31/2022     Priority: Medium     S/P laparoscopic supracervical hysterectomy 10/31/2022     Priority: Medium     History of Clostridioides difficile colitis 10/31/2022     Priority: Medium     Kidney stone 10/31/2022     Priority: Medium     Caliectasis 10/31/2022     Priority: Medium      History reviewed. No pertinent past medical history.  Past Surgical History:   Procedure Laterality Date      SECTION       COLONOSCOPY N/A 2015    Procedure: COLONOSCOPY w/ placement of hemoclip x2;  Surgeon: Nena Anaya MD;  Location: [facility];  Service:      HYSTERECTOMY      for menorrhagia     Current Outpatient Medications   Medication Sig Dispense Refill      "lactobacillus rhamnosus (GG) (CULTURELL) capsule Take 1 capsule by mouth 2 times daily       phenazopyridine (AZO URINARY PAIN RELIEF) 95 MG tablet Take 190 mg by mouth 3 times daily         Allergies   Allergen Reactions     Effexor [Venlafaxine] Anxiety        Social History     Tobacco Use     Smoking status: Never     Smokeless tobacco: Never   Substance Use Topics     Alcohol use: Yes     Comment: couple glasses of wine a week     Family History   Problem Relation Age of Onset     Family History Negative Mother      Family History Negative Father      History   Drug Use No         Objective     /88   Pulse 92   Temp 97  F (36.1  C)   Ht 1.689 m (5' 6.5\")   Wt 76.2 kg (168 lb)   SpO2 98%   BMI 26.71 kg/m      Physical Exam    GENERAL APPEARANCE: healthy, alert and no distress     EYES: EOMI, PERRL     HENT: ear canals and TM's normal and nose and mouth without ulcers or lesions     NECK: no adenopathy, no asymmetry, masses, or scars and thyroid normal to palpation     RESP: lungs clear to auscultation - no rales, rhonchi or wheezes     CV: regular rates and rhythm, normal S1 S2, no S3 or S4 and no murmur, click or rub     ABDOMEN:  soft, nontender, no HSM or masses and bowel sounds normal     MS: extremities normal- no gross deformities noted, no evidence of inflammation in joints, FROM in all extremities.     SKIN: no suspicious lesions or rashes     NEURO: Normal strength and tone, sensory exam grossly normal, mentation intact and speech normal     PSYCH: mentation appears normal. and affect normal/bright     LYMPHATICS: No cervical adenopathy    Recent Labs   Lab Test 10/31/22  1647   HGB 15.1         POTASSIUM 4.1   CR 0.66        Diagnostics:  Results for orders placed or performed in visit on 11/30/22   Basic metabolic panel  (Ca, Cl, CO2, Creat, Gluc, K, Na, BUN)     Status: Abnormal   Result Value Ref Range    Sodium 139 136 - 145 mmol/L    Potassium 4.2 3.4 - 5.3 mmol/L    " Chloride 103 98 - 107 mmol/L    Carbon Dioxide (CO2) 23 22 - 29 mmol/L    Anion Gap 13 7 - 15 mmol/L    Urea Nitrogen 17.6 6.0 - 20.0 mg/dL    Creatinine 0.61 0.51 - 0.95 mg/dL    Calcium 9.7 8.6 - 10.0 mg/dL    Glucose 108 (H) 70 - 99 mg/dL    GFR Estimate >90 >60 mL/min/1.73m2   CBC with platelets     Status: Normal   Result Value Ref Range    WBC Count 4.9 4.0 - 11.0 10e3/uL    RBC Count 4.73 3.80 - 5.20 10e6/uL    Hemoglobin 14.7 11.7 - 15.7 g/dL    Hematocrit 44.0 35.0 - 47.0 %    MCV 93 78 - 100 fL    MCH 31.1 26.5 - 33.0 pg    MCHC 33.4 31.5 - 36.5 g/dL    RDW 12.3 10.0 - 15.0 %    Platelet Count 189 150 - 450 10e3/uL     EKG: appears normal, NSR, normal axis, normal intervals, no acute ST/T changes c/w ischemia, no LVH by voltage criteria, there are no prior tracings available    Revised Cardiac Risk Index (RCRI):  The patient has the following serious cardiovascular risks for perioperative complications:   - No serious cardiac risks = 0 points     RCRI Interpretation: 0 points: Class I (very low risk - 0.4% complication rate)           Signed Electronically by: Ellyn Blanco PA-C  Copy of this evaluation report is provided to requesting physician.

## 2022-11-30 NOTE — PATIENT INSTRUCTIONS
Medication Instructions:    For 7 days prior to procedure: Hold all vitamins/supplements.  Hold all NSAID medications (ibuprofen/motrin/aleve) and aspirin.  Tylenol products OK.    Preparing for Your Surgery  Getting started  A nurse will call you to review your health history and instructions. They will give you an arrival time based on your scheduled surgery time. Please be ready to share:  Your doctor s clinic name and phone number  Your medical, surgical, and anesthesia history  A list of allergies and sensitivities  A list of medicines, including herbal treatments and over-the-counter drugs  Whether the patient has a legal guardian (ask how to send us the papers in advance)  Please tell us if you re pregnant--or if there s any chance you might be pregnant. Some surgeries may injure a fetus (unborn baby), so they require a pregnancy test. Surgeries that are safe for a fetus don t always need a test, and you can choose whether to have one.   If you have a child who s having surgery, please ask for a copy of Preparing for Your Child s Surgery.    Preparing for surgery  Within 10 to 30 days of surgery: Have a pre-op exam (sometimes called an H&P, or History and Physical). This can be done at a clinic or pre-operative center.  If you re having a , you may not need this exam. Talk to your care team.  At your pre-op exam, talk to your care team about all medicines you take. If you need to stop any medicines before surgery, ask when to start taking them again.  We do this for your safety. Many medicines can make you bleed too much during surgery. Some change how well surgery (anesthesia) drugs work.  Call your insurance company to let them know you re having surgery. (If you don t have insurance, call [phone].)  Call your clinic if there s any change in your health. This includes signs of a cold or flu (sore throat, runny nose, cough, rash, fever). It also includes a scrape or scratch near the surgery  site.  If you have questions on the day of surgery, call your hospital or surgery center.  COVID testing  You may need to be tested for COVID-19 before having surgery. If so, we will give you instructions (or click here).  Eating and drinking guidelines  For your safety: Unless your surgeon tells you otherwise, follow the guidelines below.  Eat and drink as usual until 8 hours before you arrive for surgery. After that, no food or milk.  Drink clear liquids until 2 hours before you arrive. These are liquids you can see through, like water, Gatorade, and Propel Water. They also include plain black coffee and tea (no cream or milk), candy, and breath mints. You can spit out gum when you arrive.  If you drink alcohol: Stop drinking it the night before surgery.  If your care team tells you to take medicine on the morning of surgery, it s okay to take it with a sip of water.  Preventing infection  Shower or bathe the night before and morning of your surgery. Follow the instructions your clinic gave you. (If no instructions, use regular soap.)  Don t shave or clip hair near your surgery site. We ll remove the hair if needed.  Don t smoke or vape the morning of surgery. You may chew nicotine gum up to 2 hours before surgery. A nicotine patch is okay.  Note: Some surgeries require you to completely quit smoking and nicotine. Check with your surgeon.  Your care team will make every effort to keep you safe from infection. We will:  Clean our hands often with soap and water (or an alcohol-based hand rub).  Clean the skin at your surgery site with a special soap that kills germs.  Give you a special gown to keep you warm. (Cold raises the risk of infection.)  Wear special hair covers, masks, gowns and gloves during surgery.  Give antibiotic medicine, if prescribed. Not all surgeries need antibiotics.  What to bring on the day of surgery  Photo ID and insurance card  Copy of your health care directive, if you have one  Glasses  and hearing aids (bring cases)  You can t wear contacts during surgery  Inhaler and eye drops, if you use them (tell us about these when you arrive)  CPAP machine or breathing device, if you use them  A few personal items, if spending the night  If you have . . .  A pacemaker, ICD (cardiac defibrillator) or other implant: Bring the ID card.  An implanted stimulator: Bring the remote control.  A legal guardian: Bring a copy of the certified (court-stamped) guardianship papers.  Please remove any jewelry, including body piercings. Leave jewelry and other valuables at home.  If you re going home the day of surgery  You must have a responsible adult drive you home. They should stay with you overnight as well.  If you don t have someone to stay with you, and you aren t safe to go home alone, we may keep you overnight. Insurance often won t pay for this.  After surgery  If it s hard to control your pain or you need more pain medicine, please call your surgeon s office.  Questions?   If you have any questions for your care team, list them here:   ____________________________________________________________________________________________________________________________________________________________________________________________________________________________________________________________________  For informational purposes only. Not to replace the advice of your health care provider. Copyright   2003, 2019 [facility]. All rights reserved. Clinically reviewed by Asuncion Denise MD. [place] [id number] - REV 10/22.

## 2022-12-01 ENCOUNTER — ANESTHESIA EVENT (OUTPATIENT)
Dept: SURGERY | Facility: AMBULATORY SURGERY CENTER | Age: 57
End: 2022-12-01
Payer: COMMERCIAL

## 2022-12-02 ENCOUNTER — ANESTHESIA (OUTPATIENT)
Dept: SURGERY | Facility: AMBULATORY SURGERY CENTER | Age: 57
End: 2022-12-02
Payer: COMMERCIAL

## 2022-12-02 ENCOUNTER — HOSPITAL ENCOUNTER (OUTPATIENT)
Facility: AMBULATORY SURGERY CENTER | Age: 57
Discharge: HOME | End: 2022-12-02
Attending: UROLOGY
Payer: COMMERCIAL

## 2022-12-02 VITALS
TEMPERATURE: 98 F | HEART RATE: 75 BPM | DIASTOLIC BLOOD PRESSURE: 88 MMHG | OXYGEN SATURATION: 100 % | RESPIRATION RATE: 16 BRPM | SYSTOLIC BLOOD PRESSURE: 158 MMHG

## 2022-12-02 DIAGNOSIS — N20.0 CALCULUS OF KIDNEY: Primary | ICD-10-CM

## 2022-12-02 PROCEDURE — 82365 CALCULUS SPECTROSCOPY: CPT | Performed by: UROLOGY

## 2022-12-02 PROCEDURE — 99000 SPECIMEN HANDLING OFFICE-LAB: CPT | Performed by: UROLOGY

## 2022-12-02 PROCEDURE — 52356 CYSTO/URETERO W/LITHOTRIPSY: CPT | Mod: RT | Performed by: UROLOGY

## 2022-12-02 DEVICE — URETERAL STENT
Type: IMPLANTABLE DEVICE | Site: URETER | Status: FUNCTIONAL
Brand: PERCUFLEX™ PLUS

## 2022-12-02 RX ORDER — LIDOCAINE 40 MG/G
CREAM TOPICAL
Status: DISCONTINUED | OUTPATIENT
Start: 2022-12-02 | End: 2022-12-03 | Stop reason: HOSPADM

## 2022-12-02 RX ORDER — DEXAMETHASONE SODIUM PHOSPHATE 10 MG/ML
INJECTION, SOLUTION INTRAMUSCULAR; INTRAVENOUS PRN
Status: DISCONTINUED | OUTPATIENT
Start: 2022-12-02 | End: 2022-12-02

## 2022-12-02 RX ORDER — OXYCODONE HYDROCHLORIDE 5 MG/1
5 TABLET ORAL EVERY 6 HOURS PRN
Qty: 12 TABLET | Refills: 0 | Status: SHIPPED | OUTPATIENT
Start: 2022-12-02 | End: 2022-12-06

## 2022-12-02 RX ORDER — CEFAZOLIN SODIUM 2 G/100ML
2 INJECTION, SOLUTION INTRAVENOUS SEE ADMIN INSTRUCTIONS
Status: DISCONTINUED | OUTPATIENT
Start: 2022-12-02 | End: 2022-12-03 | Stop reason: HOSPADM

## 2022-12-02 RX ORDER — FENTANYL CITRATE 0.05 MG/ML
25 INJECTION, SOLUTION INTRAMUSCULAR; INTRAVENOUS
Status: DISCONTINUED | OUTPATIENT
Start: 2022-12-02 | End: 2022-12-03 | Stop reason: HOSPADM

## 2022-12-02 RX ORDER — ACETAMINOPHEN 500 MG
1000 TABLET ORAL
Status: DISCONTINUED | OUTPATIENT
Start: 2022-12-02 | End: 2022-12-02

## 2022-12-02 RX ORDER — GABAPENTIN 300 MG/1
300 CAPSULE ORAL
Status: COMPLETED | OUTPATIENT
Start: 2022-12-02 | End: 2022-12-02

## 2022-12-02 RX ORDER — FENTANYL CITRATE 50 UG/ML
INJECTION, SOLUTION INTRAMUSCULAR; INTRAVENOUS PRN
Status: DISCONTINUED | OUTPATIENT
Start: 2022-12-02 | End: 2022-12-02

## 2022-12-02 RX ORDER — MEPERIDINE HYDROCHLORIDE 25 MG/ML
12.5 INJECTION INTRAMUSCULAR; INTRAVENOUS; SUBCUTANEOUS
Status: DISCONTINUED | OUTPATIENT
Start: 2022-12-02 | End: 2022-12-03 | Stop reason: HOSPADM

## 2022-12-02 RX ORDER — ONDANSETRON 2 MG/ML
INJECTION INTRAMUSCULAR; INTRAVENOUS PRN
Status: DISCONTINUED | OUTPATIENT
Start: 2022-12-02 | End: 2022-12-02

## 2022-12-02 RX ORDER — HYDROMORPHONE HCL IN WATER/PF 6 MG/30 ML
0.4 PATIENT CONTROLLED ANALGESIA SYRINGE INTRAVENOUS EVERY 5 MIN PRN
Status: DISCONTINUED | OUTPATIENT
Start: 2022-12-02 | End: 2022-12-03 | Stop reason: HOSPADM

## 2022-12-02 RX ORDER — HYDROMORPHONE HCL IN WATER/PF 6 MG/30 ML
0.2 PATIENT CONTROLLED ANALGESIA SYRINGE INTRAVENOUS EVERY 5 MIN PRN
Status: DISCONTINUED | OUTPATIENT
Start: 2022-12-02 | End: 2022-12-03 | Stop reason: HOSPADM

## 2022-12-02 RX ORDER — ONDANSETRON 4 MG/1
4 TABLET, ORALLY DISINTEGRATING ORAL EVERY 30 MIN PRN
Status: DISCONTINUED | OUTPATIENT
Start: 2022-12-02 | End: 2022-12-03 | Stop reason: HOSPADM

## 2022-12-02 RX ORDER — PROPOFOL 10 MG/ML
INJECTION, EMULSION INTRAVENOUS PRN
Status: DISCONTINUED | OUTPATIENT
Start: 2022-12-02 | End: 2022-12-02

## 2022-12-02 RX ORDER — FENTANYL CITRATE 0.05 MG/ML
50 INJECTION, SOLUTION INTRAMUSCULAR; INTRAVENOUS EVERY 5 MIN PRN
Status: DISCONTINUED | OUTPATIENT
Start: 2022-12-02 | End: 2022-12-03 | Stop reason: HOSPADM

## 2022-12-02 RX ORDER — SODIUM CHLORIDE, SODIUM LACTATE, POTASSIUM CHLORIDE, CALCIUM CHLORIDE 600; 310; 30; 20 MG/100ML; MG/100ML; MG/100ML; MG/100ML
INJECTION, SOLUTION INTRAVENOUS CONTINUOUS
Status: DISCONTINUED | OUTPATIENT
Start: 2022-12-02 | End: 2022-12-03 | Stop reason: HOSPADM

## 2022-12-02 RX ORDER — ACETAMINOPHEN 325 MG/1
975 TABLET ORAL ONCE
Status: DISCONTINUED | OUTPATIENT
Start: 2022-12-02 | End: 2022-12-03 | Stop reason: HOSPADM

## 2022-12-02 RX ORDER — ACETAMINOPHEN 500 MG
500-1000 TABLET ORAL EVERY 6 HOURS PRN
COMMUNITY
End: 2022-12-07

## 2022-12-02 RX ORDER — KETOROLAC TROMETHAMINE 15 MG/ML
15 INJECTION, SOLUTION INTRAMUSCULAR; INTRAVENOUS
Status: DISCONTINUED | OUTPATIENT
Start: 2022-12-02 | End: 2022-12-03 | Stop reason: HOSPADM

## 2022-12-02 RX ORDER — LIDOCAINE HYDROCHLORIDE 10 MG/ML
INJECTION, SOLUTION INFILTRATION; PERINEURAL PRN
Status: DISCONTINUED | OUTPATIENT
Start: 2022-12-02 | End: 2022-12-02

## 2022-12-02 RX ORDER — CEFAZOLIN SODIUM 2 G/100ML
2 INJECTION, SOLUTION INTRAVENOUS
Status: COMPLETED | OUTPATIENT
Start: 2022-12-02 | End: 2022-12-02

## 2022-12-02 RX ORDER — FENTANYL CITRATE 0.05 MG/ML
25 INJECTION, SOLUTION INTRAMUSCULAR; INTRAVENOUS EVERY 5 MIN PRN
Status: DISCONTINUED | OUTPATIENT
Start: 2022-12-02 | End: 2022-12-03 | Stop reason: HOSPADM

## 2022-12-02 RX ORDER — GLYCOPYRROLATE 0.2 MG/ML
INJECTION, SOLUTION INTRAMUSCULAR; INTRAVENOUS PRN
Status: DISCONTINUED | OUTPATIENT
Start: 2022-12-02 | End: 2022-12-02

## 2022-12-02 RX ORDER — ONDANSETRON 2 MG/ML
4 INJECTION INTRAMUSCULAR; INTRAVENOUS EVERY 30 MIN PRN
Status: DISCONTINUED | OUTPATIENT
Start: 2022-12-02 | End: 2022-12-03 | Stop reason: HOSPADM

## 2022-12-02 RX ADMIN — FENTANYL CITRATE 100 MCG: 50 INJECTION, SOLUTION INTRAMUSCULAR; INTRAVENOUS at 09:14

## 2022-12-02 RX ADMIN — SODIUM CHLORIDE, SODIUM LACTATE, POTASSIUM CHLORIDE, CALCIUM CHLORIDE: 600; 310; 30; 20 INJECTION, SOLUTION INTRAVENOUS at 07:52

## 2022-12-02 RX ADMIN — ONDANSETRON 4 MG: 2 INJECTION INTRAMUSCULAR; INTRAVENOUS at 09:43

## 2022-12-02 RX ADMIN — GLYCOPYRROLATE 0.2 MG: 0.2 INJECTION, SOLUTION INTRAMUSCULAR; INTRAVENOUS at 09:26

## 2022-12-02 RX ADMIN — DEXAMETHASONE SODIUM PHOSPHATE 10 MG: 10 INJECTION, SOLUTION INTRAMUSCULAR; INTRAVENOUS at 09:14

## 2022-12-02 RX ADMIN — KETOROLAC TROMETHAMINE 15 MG: 15 INJECTION, SOLUTION INTRAMUSCULAR; INTRAVENOUS at 07:55

## 2022-12-02 RX ADMIN — LIDOCAINE HYDROCHLORIDE 3 ML: 10 INJECTION, SOLUTION INFILTRATION; PERINEURAL at 09:14

## 2022-12-02 RX ADMIN — PROPOFOL 180 MG: 10 INJECTION, EMULSION INTRAVENOUS at 09:14

## 2022-12-02 RX ADMIN — GABAPENTIN 300 MG: 300 CAPSULE ORAL at 07:36

## 2022-12-02 RX ADMIN — CEFAZOLIN SODIUM 2 G: 2 INJECTION, SOLUTION INTRAVENOUS at 09:13

## 2022-12-02 NOTE — DISCHARGE INSTRUCTIONS
If you have any questions or concerns regarding your procedure, please contact Dr. Paz, his office number is [phone].      You received an IV medication today called Toradol. You received this medication at 0755. This is a NSAID. Therefore, do not take any NSAIDS (Ibuprofen products, Advil, Motrin, etc) until 1:55 pm.       Next dose of Tylenol due at 1130am.     Going Home With a Ureteral Stent    What is it?  A stent is a soft, plastic tube that helps urine (pee) drain into the bladder.  During the surgery, it is placed in the ureter the tube that connects the kidney to the bladder.  A thin curl at each end of the stent keeps one end in your kidney and the other in your bladder.  The stent can not be seen from outside of the body.    Why Do I Have It?  Some sort of blockage is not letting pee drain into your bladder.  This could be from a stone, certain surgeries or kidney infection.    What Should I Expect?   Stents often cause some discomfort.  You may have:    The need to pee suddenly    Pain when you pee    A dull backache, which may get worse when you pee  Blood in your pee (color of fruit punch) and some clots, which may increase with physical activity.     What Can I Do To Feel Better?    Drink a little more fluids than usual.  You can eat your normal diet.    Enjoy a warm bath.  Decrease your activity.  Some people find bending or twisting movement cause discomfort or increased blood in the urine.  Even so, you will not harm yourself.                                                           [facility] [place]                                                        Stent Removal With String    -Take Tylenol or Ibuprofen and drink fluids 1 hour prior to removing your stent at home.    -Remove the stent in the morning on the specific day as directed by your doctor.  Gently pull on the string in the shower while urinating until the stent is completely removed.    -Call [facility] if you have  "questions or concerns [phone]    -What To Expect After Your Stent Is Removed    -After stent removal, urine may be bloody and possibly have some bloody clots.    -You may experience an \"achy\" pain due to urethral spasms.  This generally only lasts a few hours, but should resolve over the next 2-3 days    "

## 2022-12-02 NOTE — RESULT ENCOUNTER NOTE
Valery  I have reviewed your recent test results:    -Normal red blood cell (hgb) levels, normal white blood cell count and normal platelet levels.  -Kidney function is normal (Cr, GFR), Sodium is normal, Potassium is normal, Calcium is normal, Glucose is normal for a nonfasting blood test.    Good luck with your procedure!     For additional lab test information, [email or web address] is an excellent reference.     If you have any questions please do not hesitate to contact our office via phone ([phone]) or [facility] [employer].    Healthy regards,     Ellyn Blanco MBA, MS, PA-C  [facility]

## 2022-12-02 NOTE — ANESTHESIA PROCEDURE NOTES
Airway       Patient location during procedure: OR  Staff -        Anesthesiologist:  Rogelio Escobar MD       CRNA: Shelley Bender APRN CRNA       Performed By: CRNA  Consent for Airway        Urgency: elective  Indications and Patient Condition       Indications for airway management: jay-procedural       Induction type:intravenous       Mask difficulty assessment: 0 - not attempted    Final Airway Details       Final airway type: supraglottic airway    Supraglottic Airway Details        Type: LMA       Brand: Ambu AuraGain       LMA size: 4    Post intubation assessment        Placement verified by: capnometry and chest rise        Number of attempts at approach: 1       Secured with: silk tape       Ease of procedure: easy       Dentition: Intact and Unchanged

## 2022-12-02 NOTE — ANESTHESIA CARE TRANSFER NOTE
Patient: Khushi Sawant    Procedure: Procedure(s):  CYSTOURETEROSCOPY, WITH LASER LITHOTRIPSY, CALCULUS REMOVAL AND URETERAL STENT INSERTION       Diagnosis: Calculus of kidney [N20.0]  Diagnosis Additional Information: No value filed.    Anesthesia Type:   General     Note:    Oropharynx: oropharynx clear of all foreign objects and spontaneously breathing  Level of Consciousness: awake and drowsy  Oxygen Supplementation: face mask  Level of Supplemental Oxygen (L/min / FiO2): 4  Independent Airway: airway patency satisfactory and stable  Dentition: dentition unchanged  Vital Signs Stable: post-procedure vital signs reviewed and stable  Report to RN Given: handoff report given  Patient transferred to: PACU    Handoff Report: Identifed the Patient, Identified the Reponsible Provider, Reviewed the pertinent medical history, Discussed the surgical course, Reviewed Intra-OP anesthesia mangement and issues during anesthesia, Set expectations for post-procedure period and Allowed opportunity for questions and acknowledgement of understanding      Vitals:  Vitals Value Taken Time   /81 12/02/22 0956   Temp 36.4  C (97.5  F) 12/02/22 0955   Pulse 76 12/02/22 0957   Resp 16 12/02/22 0955   SpO2 97 % 12/02/22 0957   Vitals shown include unvalidated device data.    Electronically Signed By: ROSE MARIE Gibbs CRNA  December 2, 2022  9:59 AM

## 2022-12-02 NOTE — ANESTHESIA PREPROCEDURE EVALUATION
Anesthesia Pre-Procedure Evaluation    Patient: Khushi Sawant   MRN: [id number] : 1965        Procedure : Procedure(s):  CYSTOURETEROSCOPY, WITH LASER LITHOTRIPSY, CALCULUS REMOVAL AND URETERAL STENT INSERTION          History reviewed. No pertinent past medical history.   Past Surgical History:   Procedure Laterality Date      SECTION       COLONOSCOPY N/A 2015    Procedure: COLONOSCOPY w/ placement of hemoclip x2;  Surgeon: Nena Anaya MD;  Location: [facility];  Service:      HYSTERECTOMY      for menorrhagia      Allergies   Allergen Reactions     Effexor [Venlafaxine] Anxiety      Social History     Tobacco Use     Smoking status: Never     Smokeless tobacco: Never   Substance Use Topics     Alcohol use: Yes     Comment: couple glasses of wine a week      Wt Readings from Last 1 Encounters:   22 76.2 kg (168 lb)        Anesthesia Evaluation            ROS/MED HX  ENT/Pulmonary:  - neg pulmonary ROS     Neurologic:  - neg neurologic ROS     Cardiovascular:  - neg cardiovascular ROS     METS/Exercise Tolerance:     Hematologic:  - neg hematologic  ROS     Musculoskeletal:  - neg musculoskeletal ROS     GI/Hepatic:  - neg GI/hepatic ROS     Renal/Genitourinary:  - neg Renal ROS     Endo:  - neg endo ROS     Psychiatric/Substance Use:  - neg psychiatric ROS     Infectious Disease:  - neg infectious disease ROS     Malignancy:  - neg malignancy ROS     Other:  - neg other ROS          Physical Exam    Airway  airway exam normal      Mallampati: II       Respiratory Devices and Support         Dental  no notable dental history         Cardiovascular   cardiovascular exam normal       Rhythm and rate: regular and normal     Pulmonary   pulmonary exam normal        breath sounds clear to auscultation           OUTSIDE LABS:  CBC:   Lab Results   Component Value Date    WBC 4.9 2022    WBC 6.8 10/31/2022    HGB 14.7 2022    HGB 15.1 10/31/2022    HCT 44.0  11/30/2022    HCT 47.3 (H) 10/31/2022     11/30/2022     10/31/2022     BMP:   Lab Results   Component Value Date     11/30/2022     10/31/2022    POTASSIUM 4.2 11/30/2022    POTASSIUM 4.1 10/31/2022    CHLORIDE 103 11/30/2022    CHLORIDE 101 10/31/2022    CO2 23 11/30/2022    CO2 26 10/31/2022    BUN 17.6 11/30/2022    BUN 16.5 10/31/2022    CR 0.61 11/30/2022    CR 0.66 10/31/2022     (H) 11/30/2022     (H) 10/31/2022     COAGS: No results found for: PTT, INR, FIBR  POC:   Lab Results   Component Value Date    HCG Negative 06/03/2008     HEPATIC:   Lab Results   Component Value Date    ALBUMIN 4.7 10/31/2022    PROTTOTAL 7.2 10/31/2022    ALT 42 (H) 10/31/2022    AST 25 10/31/2022    ALKPHOS 84 10/31/2022    BILITOTAL 0.4 10/31/2022     OTHER:   Lab Results   Component Value Date    VIKTORIYA 9.7 11/30/2022    TSH 0.75 02/24/2017    CRP <3.00 10/31/2022    SED 16 10/31/2022       Anesthesia Plan    ASA Status:  1      Anesthesia Type: General.     - Airway: LMA   Induction: Intravenous, Propofol.   Maintenance: TIVA.        Consents    Anesthesia Plan(s) and associated risks, benefits, and realistic alternatives discussed. Questions answered and patient/representative(s) expressed understanding.    - Discussed:     - Discussed with:  Patient, Spouse      - Extended Intubation/Ventilatory Support Discussed: No.      - Patient is DNR/DNI Status: No    Use of blood products discussed: No .     Postoperative Care    Pain management: IV analgesics, Oral pain medications.   PONV prophylaxis: Ondansetron (or other 5HT-3), Dexamethasone or Solumedrol     Comments:    Other Comments: GALMA  Antiemetics - zofran 4mg/decadron 8mg (4mg if patient is diabetic)  Toradol at the end of the case if okay with the surgeon            Rogelio Escobar MD

## 2022-12-02 NOTE — ANESTHESIA POSTPROCEDURE EVALUATION
Patient: Khushi Sawant    Procedure: Procedure(s):  CYSTOURETEROSCOPY, WITH LASER LITHOTRIPSY, CALCULUS REMOVAL AND URETERAL STENT INSERTION       Anesthesia Type:  General    Note:  Disposition: Outpatient   Postop Pain Control: Uneventful            Sign Out: Well controlled pain   PONV: No   Neuro/Psych: Uneventful            Sign Out: Acceptable/Baseline neuro status   Airway/Respiratory: Uneventful            Sign Out: Acceptable/Baseline resp. status   CV/Hemodynamics: Uneventful            Sign Out: Acceptable CV status; No obvious hypovolemia; No obvious fluid overload   Other NRE:    DID A NON-ROUTINE EVENT OCCUR?            Last vitals:  Vitals Value Taken Time   /77 12/02/22 1001   Temp 97.5  F (36.4  C) 12/02/22 0955   Pulse 75 12/02/22 1008   Resp 16 12/02/22 1000   SpO2 98 % 12/02/22 1008   Vitals shown include unvalidated device data.    Electronically Signed By: Rogelio Escobar MD  December 2, 2022  1:41 PM

## 2022-12-02 NOTE — OP NOTE
[facility]  [facility] Operative Note    Khushi Sawant   December 2, 2022 12/2/2022   Camila Pederson     Procedure Performed  Procedure(s):  CYSTOURETEROSCOPY, WITH LASER LITHOTRIPSY, CALCULUS REMOVAL AND URETERAL STENT INSERTION  1. Cystoscopy  2. Retrograde Pyelography - Right  3. Ureteroscopic Laser Lithotripsy - Right Ureter Proximal  4. Ureteral Stent Insertion - Right    Pre-operative Diagnosis  Calculus of kidney [N20.0]    Post-operative Diagnosis  1. Stone Right Ureter Proximal      Surgeon(s) and Role:     * Garfield Paz MD - Primary    Anesthesia Type  Not documented     Procedural Summary    Estimation of stone clearance: <2 mm residual  Subjective stone composition: calcium  Renal papillae assessment: plaques mild  Unanticipated event/findings: none  Post-operative plan: remove own stent in 6 days with extraction string return to clinic in 1 month without CT scan    Narrative    Successful clearance of large proximal ureteral stone which had fallen dropped from the kidney since last imaging.    Procedural Details    Patient is brought to the surgical suite where anesthesia is induced. she is prepped and draped in standard fashion in combined Trendelenberg and lithotomy position.    Cystoscopy: Rigid cystoscopy is performed. Introitus is normal. Bladder is normal..     Retrograde Pyelography:  imaging fails to demonstrate radio-opaque renal stone consistent with pre-operative imaging. Right retrograde pyelography demonstrates radio-opaque proximal ureteric stone with obstruction.     Ureteral Access: Right ureteral access is initiated with Bentson wire. Ureteral stone is moderately impacted.  Guide wire access to the kidney is assured. 8F dilator is inserted with minimal resistance. 10F dilator is inserted with minimal resistance.      Rigid Ureteroscopy: Rigid ureteroscope is inserted in right ureter. Distal ureteric stone is cnfirmed to have passed.       Ureteral Access: 14F 36 cm ureteral access sheath is inserted with minimal resistance.      Flexible Ureteroscopy: Flexible ureteroscope is passed to right proximal ureteric stone.   Stone is mildly impacted. Stone retropulses to kidney. Stone is translocated to dependent renal upper pole calyx. Laser lithotripsy is performed in the kidney. All stone fragments are removed from kidney.     Ureteral Stent Insertion: Right 7F 26 cm stent is inserted with good coil in kidney and bladder under fluoroscopic guidance. Stent extraction string left dangling from urethra.      The patient was then taken to the recovery room in good condition.     History reviewed. No pertinent past medical history.     Patient Active Problem List   Diagnosis     Sessile colonic polyp 2015 -  normal 2018 [facility] - 5 year f/u recommended     S/P laparoscopic supracervical hysterectomy     History of Clostridioides difficile colitis     Kidney stone     Caliectasis        Estimated Blood Loss  .* No values recorded between 12/2/2022  9:25 AM and 12/2/2022  9:54 AM *     ID Type Source Tests Collected by Time Destination   A :  Calculus/Stone Kidney, Right STONE ANALYSIS Garfield Paz MD 12/2/2022  9:41 AM

## 2022-12-04 LAB
APPEARANCE STONE: NORMAL
COMPN STONE: NORMAL
SPECIMEN WT: 5 MG

## 2022-12-06 ENCOUNTER — TELEPHONE (OUTPATIENT)
Dept: FAMILY MEDICINE | Facility: CLINIC | Age: 57
End: 2022-12-06

## 2022-12-06 ENCOUNTER — OFFICE VISIT (OUTPATIENT)
Dept: FAMILY MEDICINE | Facility: CLINIC | Age: 57
End: 2022-12-06
Payer: COMMERCIAL

## 2022-12-06 VITALS
RESPIRATION RATE: 96 BRPM | HEART RATE: 90 BPM | BODY MASS INDEX: 26.1 KG/M2 | HEIGHT: 67 IN | TEMPERATURE: 96.4 F | SYSTOLIC BLOOD PRESSURE: 136 MMHG | WEIGHT: 166.3 LBS | DIASTOLIC BLOOD PRESSURE: 78 MMHG

## 2022-12-06 DIAGNOSIS — N20.0 KIDNEY STONE: ICD-10-CM

## 2022-12-06 DIAGNOSIS — Z86.19 HISTORY OF CLOSTRIDIOIDES DIFFICILE COLITIS: ICD-10-CM

## 2022-12-06 DIAGNOSIS — R73.09 ELEVATED GLUCOSE: ICD-10-CM

## 2022-12-06 DIAGNOSIS — N95.2 ATROPHIC VAGINITIS: ICD-10-CM

## 2022-12-06 DIAGNOSIS — Z13.29 SCREENING FOR THYROID DISORDER: ICD-10-CM

## 2022-12-06 DIAGNOSIS — Z13.220 SCREENING FOR HYPERLIPIDEMIA: ICD-10-CM

## 2022-12-06 DIAGNOSIS — Z00.00 ROUTINE GENERAL MEDICAL EXAMINATION AT A HEALTH CARE FACILITY: Primary | ICD-10-CM

## 2022-12-06 DIAGNOSIS — N94.10 DYSPAREUNIA, FEMALE: ICD-10-CM

## 2022-12-06 DIAGNOSIS — K63.5 SESSILE COLONIC POLYP: ICD-10-CM

## 2022-12-06 DIAGNOSIS — R74.8 ELEVATED LIVER ENZYMES: ICD-10-CM

## 2022-12-06 LAB
ALBUMIN SERPL BCG-MCNC: 4.7 G/DL (ref 3.5–5.2)
ALP SERPL-CCNC: 101 U/L (ref 35–104)
ALT SERPL W P-5'-P-CCNC: 105 U/L (ref 10–35)
ANION GAP SERPL CALCULATED.3IONS-SCNC: 11 MMOL/L (ref 7–15)
AST SERPL W P-5'-P-CCNC: 62 U/L (ref 10–35)
BILIRUB SERPL-MCNC: 0.5 MG/DL
BUN SERPL-MCNC: 10.5 MG/DL (ref 6–20)
CALCIUM SERPL-MCNC: 10 MG/DL (ref 8.6–10)
CHLORIDE SERPL-SCNC: 103 MMOL/L (ref 98–107)
CHOLEST SERPL-MCNC: 215 MG/DL
CREAT SERPL-MCNC: 0.66 MG/DL (ref 0.51–0.95)
DEPRECATED HCO3 PLAS-SCNC: 26 MMOL/L (ref 22–29)
GFR SERPL CREATININE-BSD FRML MDRD: >90 ML/MIN/1.73M2
GLUCOSE SERPL-MCNC: 102 MG/DL (ref 70–99)
HBA1C MFR BLD: 5.6 % (ref 0–5.6)
HDLC SERPL-MCNC: 61 MG/DL
LDLC SERPL CALC-MCNC: 123 MG/DL
NONHDLC SERPL-MCNC: 154 MG/DL
POTASSIUM SERPL-SCNC: 4.1 MMOL/L (ref 3.4–5.3)
PROT SERPL-MCNC: 7.3 G/DL (ref 6.4–8.3)
SODIUM SERPL-SCNC: 140 MMOL/L (ref 136–145)
TRIGL SERPL-MCNC: 155 MG/DL
TSH SERPL DL<=0.005 MIU/L-ACNC: 0.49 UIU/ML (ref 0.3–4.2)

## 2022-12-06 PROCEDURE — 80053 COMPREHEN METABOLIC PANEL: CPT | Performed by: NURSE PRACTITIONER

## 2022-12-06 PROCEDURE — 80061 LIPID PANEL: CPT | Performed by: NURSE PRACTITIONER

## 2022-12-06 PROCEDURE — 84443 ASSAY THYROID STIM HORMONE: CPT | Performed by: NURSE PRACTITIONER

## 2022-12-06 PROCEDURE — 36415 COLL VENOUS BLD VENIPUNCTURE: CPT | Performed by: NURSE PRACTITIONER

## 2022-12-06 PROCEDURE — 99213 OFFICE O/P EST LOW 20 MIN: CPT | Mod: 25 | Performed by: NURSE PRACTITIONER

## 2022-12-06 PROCEDURE — 83036 HEMOGLOBIN GLYCOSYLATED A1C: CPT | Performed by: NURSE PRACTITIONER

## 2022-12-06 PROCEDURE — 99396 PREV VISIT EST AGE 40-64: CPT | Performed by: NURSE PRACTITIONER

## 2022-12-06 ASSESSMENT — ENCOUNTER SYMPTOMS
SHORTNESS OF BREATH: 0
CONSTIPATION: 0
HEADACHES: 0
BREAST MASS: 0
COUGH: 0
DIZZINESS: 0
PARESTHESIAS: 0
PALPITATIONS: 0
DYSURIA: 0
JOINT SWELLING: 0
HEMATOCHEZIA: 0
ARTHRALGIAS: 0
ABDOMINAL PAIN: 0
EYE PAIN: 0
CHILLS: 0
WEAKNESS: 0
MYALGIAS: 0
FREQUENCY: 0
HEMATURIA: 0
SORE THROAT: 0
NERVOUS/ANXIOUS: 0
HEARTBURN: 0
DIARRHEA: 0
NAUSEA: 0
FEVER: 0

## 2022-12-06 NOTE — TELEPHONE ENCOUNTER
Reason for Call:  Form, our goal is to have forms completed with 72 hours, however, some forms may require a visit or additional information.    Type of letter, form or note:  RX Change    Who is the form from?: [facility] (if other please explain)    Where did the form come from: form was faxed in    What clinic location was the form placed at?: [facility]    Where the form was placed: Camila Pederson Box/Folder    What number is listed as a contact on the form?: [phone]       Additional Information: Premarin Vag     Call taken on 12/6/2022 at 1:40 PM by Daniella Mayorga

## 2022-12-06 NOTE — PROGRESS NOTES
Assessment & Plan     Routine general medical examination at a health care facility  Well woman exam completed today.    Fasting labs today.    Will notify of lab results.    History of Clostridioides difficile colitis  Mindful of antx.     Kidney stone  Kidney stone prevention discussed.   Follow up with Urology   - Comprehensive metabolic panel (BMP + Alb, Alk Phos, ALT, AST, Total. Bili, TP)  - Comprehensive metabolic panel (BMP + Alb, Alk Phos, ALT, AST, Total. Bili, TP)    Sessile colonic polyp 2015 -  normal 2018 [facility] - 5 year f/u recommended  Need records from [place].    Elevated glucose    - Comprehensive metabolic panel (BMP + Alb, Alk Phos, ALT, AST, Total. Bili, TP)  - Hemoglobin A1c  - Comprehensive metabolic panel (BMP + Alb, Alk Phos, ALT, AST, Total. Bili, TP)  - Hemoglobin A1c    Atrophic vaginitis  Known.   Topical vaginal estrogen.  Yearly exam with us or OBGYN yealry breast exam and mammo.  - estradiol (ESTRACE) 0.1 MG/GM vaginal cream  Dispense: 42.5 g; Refill: 11    Dyspareunia, female    - estradiol (ESTRACE) 0.1 MG/GM vaginal cream  Dispense: 42.5 g; Refill: 11    Screening for thyroid disorder    - TSH with free T4 reflex  - TSH with free T4 reflex    Screening for hyperlipidemia    - Lipid panel reflex to direct LDL Non-fasting  - Lipid panel reflex to direct LDL Non-fasting    Elevated liver enzymes    - Hepatic panel (Albumin, ALT, AST, Bili, Alk Phos, TP)      Return in about 1 year (around 12/6/2023) for wellness exam with labs.      ROSE MARIE [facility]   Valery is a 57 year old, presenting for the following health issues:  Establish Care  Establish Care and Labs    Healthy Habits:     Getting at least 3 servings of Calcium per day:  NO    Bi-annual eye exam:  Yes    Dental care twice a year:  Yes    Sleep apnea or symptoms of sleep apnea:  None    Diet:  Regular (no restrictions)    Frequency of exercise:  4-5 days/week    Duration of  "exercise:  30-45 minutes    Taking medications regularly:  Yes    Medication side effects:  Not applicable    PHQ-2 Total Score: 0    Additional concerns today:  Yes       Review of Systems   Constitutional: Negative for chills and fever.   HENT: Negative for congestion, ear pain, hearing loss and sore throat.    Eyes: Negative for pain and visual disturbance.   Respiratory: Negative for cough and shortness of breath.    Cardiovascular: Negative for chest pain, palpitations and peripheral edema.   Gastrointestinal: Negative for abdominal pain, constipation, diarrhea, heartburn, hematochezia and nausea.   Breasts:  Negative for tenderness, breast mass and discharge.   Genitourinary: Negative for dysuria, frequency, genital sores, hematuria, pelvic pain, urgency, vaginal bleeding and vaginal discharge.   Musculoskeletal: Negative for arthralgias, joint swelling and myalgias.   Skin: Negative for rash.   Neurological: Negative for dizziness, weakness, headaches and paresthesias.   Psychiatric/Behavioral: Negative for mood changes. The patient is not nervous/anxious.        Recently diagnosed with Kidney stones. Recent surgery.    CYSTOURETEROSCOPY, WITH LASER LITHOTRIPSY, CALCULUS REMOVAL AND URETERAL STENT INSERTION (Right: Ureter)       Has stent in place; will take it out at home.   Dyspareunia since menopause.  [facility] pap and breast/mammo  Colonoscopy every 5 follows with [facility]      Objective    /78   Pulse 90   Temp (!) 96.4  F (35.8  C) (Tympanic)   Resp (!) 96   Ht 1.689 m (5' 6.5\")   Wt 75.4 kg (166 lb 4.8 oz)   HC 16 cm (6.3\")   BMI 26.44 kg/m    Body mass index is 26.44 kg/m .  Physical Exam   GENERAL: healthy, alert and no distress  EYES: Eyes grossly normal to inspection, PERRL and conjunctivae and sclerae normal  HENT: ear canals and TM's normal, nose and mouth without ulcers or lesions  NECK: no adenopathy, no asymmetry, masses, or scars and thyroid normal to palpation  RESP: lungs " clear to auscultation - no rales, rhonchi or wheezes  BREAST: Declines  CV: regular rate and rhythm, normal S1 S2, no S3 or S4, no murmur, click or rub, no peripheral edema and peripheral pulses strong  ABDOMEN: soft, nontender, no hepatosplenomegaly, no masses and bowel sounds normal   (female): Declines  MS: no gross musculoskeletal defects noted, no edema  SKIN: no suspicious lesions or rashes  NEURO: Normal strength and tone, mentation intact and speech normal  PSYCH: mentation appears normal, affect normal/bright    Results for orders placed or performed in visit on 12/06/22   Lipid panel reflex to direct LDL Non-fasting     Status: Abnormal   Result Value Ref Range    Cholesterol 215 (H) <200 mg/dL    Triglycerides 155 (H) <150 mg/dL    Direct Measure HDL 61 >=50 mg/dL    LDL Cholesterol Calculated 123 (H) <=100 mg/dL    Non HDL Cholesterol 154 (H) <130 mg/dL    Narrative    Cholesterol  Desirable:  <200 mg/dL    Triglycerides  Normal:  Less than 150 mg/dL  Borderline High:  150-199 mg/dL  High:  200-499 mg/dL  Very High:  Greater than or equal to 500 mg/dL    Direct Measure HDL  Female:  Greater than or equal to 50 mg/dL   Male:  Greater than or equal to 40 mg/dL    LDL Cholesterol  Desirable:  <100mg/dL  Above Desirable:  100-129 mg/dL   Borderline High:  130-159 mg/dL   High:  160-189 mg/dL   Very High:  >= 190 mg/dL    Non HDL Cholesterol  Desirable:  130 mg/dL  Above Desirable:  130-159 mg/dL  Borderline High:  160-189 mg/dL  High:  190-219 mg/dL  Very High:  Greater than or equal to 220 mg/dL   TSH with free T4 reflex     Status: Normal   Result Value Ref Range    TSH 0.49 0.30 - 4.20 uIU/mL   Comprehensive metabolic panel (BMP + Alb, Alk Phos, ALT, AST, Total. Bili, TP)     Status: Abnormal   Result Value Ref Range    Sodium 140 136 - 145 mmol/L    Potassium 4.1 3.4 - 5.3 mmol/L    Chloride 103 98 - 107 mmol/L    Carbon Dioxide (CO2) 26 22 - 29 mmol/L    Anion Gap 11 7 - 15 mmol/L    Urea Nitrogen  10.5 6.0 - 20.0 mg/dL    Creatinine 0.66 0.51 - 0.95 mg/dL    Calcium 10.0 8.6 - 10.0 mg/dL    Glucose 102 (H) 70 - 99 mg/dL    Alkaline Phosphatase 101 35 - 104 U/L    AST 62 (H) 10 - 35 U/L     (H) 10 - 35 U/L    Protein Total 7.3 6.4 - 8.3 g/dL    Albumin 4.7 3.5 - 5.2 g/dL    Bilirubin Total 0.5 <=1.2 mg/dL    GFR Estimate >90 >60 mL/min/1.73m2   Hemoglobin A1c     Status: Normal   Result Value Ref Range    Hemoglobin A1C 5.6 0.0 - 5.6 %

## 2022-12-06 NOTE — PATIENT INSTRUCTIONS
Vaginal moisturizers are intended for use routinely, typically two or three days per week, not just during sexual activity. These products are typically bioadhesives. Many moisturizer products are available in pharmacies and online (eg, Replens, Vagisil Moisturizer, Feminease, Moist Again, K-Y Liquibeads, Hyalo GYN).     Lubricants are used only at the time of sexual activity. Lubricants may be water-based (eg, Astroglide, Slippery Stuff, K-Y Jelly), silicone-based (eg, Pjur, ID Millennium), or oil-based (Elegance Women's Lubricant); oil-based lubricants cause breakdown of latex condoms. Use of vaginal moisturizers and lubricants alone is effective treatment for vaginal dryness or dyspareunia in some patients.    Preventive Health Recommendations  Female Ages 50 - 64    Yearly exam: See your health care provider every year in order to  o Review health changes.   o Discuss preventive care.    o Review your medicines if your doctor has prescribed any.      Get a Pap test every three years (unless you have an abnormal result and your provider advises testing more often).    If you get Pap tests with HPV test, you only need to test every 5 years, unless you have an abnormal result.     You do not need a Pap test if your uterus was removed (hysterectomy) and you have not had cancer.    You should be tested each year for STDs (sexually transmitted diseases) if you're at risk.     Have a mammogram every 1 to 2 years.    Have a colonoscopy at age 50, or have a yearly FIT test (stool test). These exams screen for colon cancer.      Have a cholesterol test every 5 years, or more often if advised.    Have a diabetes test (fasting glucose) every three years. If you are at risk for diabetes, you should have this test more often.     If you are at risk for osteoporosis (brittle bone disease), think about having a bone density scan (DEXA).    Shots: Get a flu shot each year. Get a tetanus shot every 10 years.    Nutrition:     Eat  at least 5 servings of fruits and vegetables each day.    Eat whole-grain bread, whole-wheat pasta and brown rice instead of white grains and rice.    Get adequate Calcium and Vitamin D.     Lifestyle    Exercise at least 150 minutes a week (30 minutes a day, 5 days a week). This will help you control your weight and prevent disease.    Limit alcohol to one drink per day.    No smoking.     Wear sunscreen to prevent skin cancer.     See your dentist every six months for an exam and cleaning.    See your eye doctor every 1 to 2 years.

## 2022-12-06 NOTE — TELEPHONE ENCOUNTER
Reason for Call:  Form, our goal is to have forms completed with 72 hours, however, some forms may require a visit or additional information.    Type of letter, form or note:  Prior Auth    Who is the form from?: [employer] (if other please explain)    Where did the form come from: form was faxed in    What clinic location was the form placed at?: [facility]    Where the form was placed: Camila Pederson Box/Folder    What number is listed as a contact on the form?: [phone]           Call taken on 12/6/2022 at 5:15 PM by Daniella Mayorga

## 2022-12-07 ENCOUNTER — MYC MEDICAL ADVICE (OUTPATIENT)
Dept: FAMILY MEDICINE | Facility: CLINIC | Age: 57
End: 2022-12-07

## 2022-12-07 RX ORDER — ESTRADIOL 0.1 MG/G
CREAM VAGINAL
Qty: 42.5 G | Refills: 11 | Status: SHIPPED | OUTPATIENT
Start: 2022-12-07 | End: 2023-06-12

## 2022-12-09 NOTE — RESULT ENCOUNTER NOTE
"Dear Valery,    Here is a summary of your recent test results:    Sorry for the delayed response.  Very mildly elevated liver enzymes and yes this could be multifactorial with recent surgery and Tylenol.  I am not concerned.  Encourage minimal alcohol usage and decrease Tylenol if able.    We can always recheck this in a few months.  I will place that order you can just come in nonfasting with a set up an appointment for just a lab draw.  You can make this appointment through [facility].    LDL(bad) cholesterol and your triglycerides are slightly elevated but you have excellent \"good cholesterol\" HDL.  A diet high in fat and simple carbohydrates, genetics and being overweight can contribute to this. ADVISE: exercising 150 minutes of aerobic exercise per week (30 minutes for 5 days per week or 50 minutes for 3 days per week are options), eating a low carbohydrate diet an be helpful.  We will recheck this yearly.    Please call us at [phone] (or use [facility]) to address the above recommendations if needed.    Thank you for choosing  [facility].  It was an honor and a privilege to participate in your care.       Healthy regards,    Camila Pederson, THIERRY  [facility]"

## 2022-12-10 ENCOUNTER — HEALTH MAINTENANCE LETTER (OUTPATIENT)
Age: 57
End: 2022-12-10

## 2022-12-29 ENCOUNTER — MEDICAL CORRESPONDENCE (OUTPATIENT)
Dept: HEALTH INFORMATION MANAGEMENT | Facility: CLINIC | Age: 57
End: 2022-12-29

## 2022-12-29 ENCOUNTER — VIRTUAL VISIT (OUTPATIENT)
Dept: UROLOGY | Facility: CLINIC | Age: 57
End: 2022-12-29
Payer: COMMERCIAL

## 2022-12-29 ENCOUNTER — TELEPHONE (OUTPATIENT)
Dept: UROLOGY | Facility: CLINIC | Age: 57
End: 2022-12-29

## 2022-12-29 DIAGNOSIS — N20.0 CALCULUS OF KIDNEY: Primary | ICD-10-CM

## 2022-12-29 PROCEDURE — 99213 OFFICE O/P EST LOW 20 MIN: CPT | Mod: 95 | Performed by: UROLOGY

## 2022-12-29 ASSESSMENT — PAIN SCALES - GENERAL: PAINLEVEL: NO PAIN (0)

## 2022-12-29 NOTE — PATIENT INSTRUCTIONS
Patient Stated Goal: Prevent further stones  Steps for collecting a 24 hour urine specimen    Please follow the directions carefully. All urine voided for a 24-hour period needs to be collected into the jug.  DO NOT change any of your  normal  daily habits when doing this test. Continue to follow your regular diet, intake of fluids, and usual activity level. Pick the most convenient day with your schedule, perhaps on a weekend or a day off.    Start your Diet Log the day before collection and continue on the day of urine collection.  You MUST bring Diet Log with you on follow up visit to discuss results.  One 24hr Urine Collection   Two 24hr Urine Collections  (do not collect on consecutive days)    PLEASE COMPLETE THE 2nd JUG WITHIN 1-2 WEEKS FROM THE 1st JUG    STEP 1  Empty your bladder completely into the toilet. This will be your start time. Write your full legal name, start date and time on the jug label.  Collection start and stop times need to match exactly!  For example:  6 am to 6 am.    STEP 2  The next time you urinate, empty your bladder directly into the jug or collection hat and pour urine into the jug.  Screw the lid back onto the jug.  Do not spill!    STEP 3  Place the jug in the refrigerator or a cooler with ice during the collection period.  Failure to keep it cool could cause inaccurate test results. DO NOT Freeze.    STEP 4  Continue collecting all urine into the jug for the rest of the day, for the full 24 hours.  DO NOT stop early or go over 24 hours!    STEP 5  Exactly 24 hours from start of collection, write your full legal name, stop date and time on the jug label.   Collection start and stop times need to match exactly!  For example:  6 am to 6 am.  Failure to label correctly will result in recollection of urine specimen.    STEP 6  Return each jug within 24 hours after final urination.     STEP 7  Drop off jug locations:     STEP 8  Please call [facility] after return of your final jug to  schedule your follow-up visit. [phone]

## 2022-12-29 NOTE — PROGRESS NOTES
Patient is roomed via telephone for a virtual visit.  Patient confirmed she is in the [facility] at the time of this appointment.  Patient understand that this visit is billable and agree to proceed with appointment.

## 2022-12-29 NOTE — PROGRESS NOTES
Assessment/Plan:    Assessment & Plan   Khushi was seen today for post op follow up.    Diagnoses and all orders for this visit:    Calculus of kidney  -     Renal Function Panel ([facility])  -     CALCIUM, IONIZED, SERUM  -     Stone formation, timed urine; Standing  -     Patient Stated Goal: Prevent further stones  -     Parathyroid Hormone Intact; Future        Stone Management Plan  Stone Management 11/3/2022 11/10/2022 12/29/2022   Urinary Tract Infection - No suspicion of infection No suspicion of infection   Renal Colic - Well controlled symptoms Asymptomatic at this time   Renal Failure - No suspicion of renal failure No suspicion of renal failure   Current CT date 10/31/2022 11/9/2022 -   Right sided stones? Yes Yes -   R Number of ureteral stones 1 1 -   R GSD of ureteral stones 5 4 -   R Location of ureteral stone Distal Distal -   R Number of kidney stones  1 1 -   R GSD of kidney stones 10 - 15 4 - 10 -   R Hydronephrosis None None -   R Stone Event New event Established event Resolved event   Diagnosis date 10/31/2022 - -   Initial location of primary symptomatic stone Distal - -   Initial GSD of primary symptomatic stone 5 - -   Resolved date - - 12/29/2022   R Post-op status - - No imaging   R MET status - Progression -   R Current Plan MET Clear -   MET 1 week F/U - -   Clear rationale - Associated treated infection -   Left sided stones? Yes Yes -   L Number of ureteral stones No ureteral stones No ureteral stones -   L Number of kidney stones  2 Renal stones unchanged from last exam -   L GSD of kidney stones 4 - 10 - -   L Hydronephrosis None - -   L Stone Event No current event No current event No current event   L Current Plan Observe - -   Observe rationale Limited stone burden with good prognosis for spontaneous passage - -             PLAN    Video call duration: 10 minutes  Distant site (provider site): Clinic  15 minutes spent on the date of the encounter doing chart review, history and  exam, documentation and further activities per the note    MARIA DEL CARMEN PAZ MD  [facility]    HPI  Ms. Khushi Sawant is a 57 year old  female who is being evaluated via a billable video visit by [facility] for late postoperative follow-up.     She returns status post Right ureteroscopic laser lithotripsy for renal and ureteral stone. She has had no unanticipated post-operative events.     She is asymptomatic at present. She denies symptoms of fever, chills, flank pain, nausea, vomiting, urinary frequency and dysuria.    Imaging was not performed today because confident stone clearance..     Stone composition was 70 % calcium oxalate and 30 % calcium phosphate (apatite).     She is at risk for ongoing active stone disease and will initiate stone risk evaluation. Serum stone risk chemistries including parathyroid hormone and ionized calcium will be obtained before next visit. Two 24 hour urine collections and dietary journal will be obtained at earliest covenience..    ROS   Review of systems is negative except for HPI.    No past medical history on file.    Past Surgical History:   Procedure Laterality Date      SECTION       COLONOSCOPY N/A 2015    Procedure: COLONOSCOPY w/ placement of hemoclip x2;  Surgeon: Nena Anaya MD;  Location: [facility];  Service:      COMBINED CYSTOSCOPY, INSERT STENT URETER(S) Right 2022    Procedure: CYSTOURETEROSCOPY, WITH LASER LITHOTRIPSY, CALCULUS REMOVAL AND URETERAL STENT INSERTION;  Surgeon: Maria Del Carmen Paz MD;  Location: [facility] OR     HYSTERECTOMY      for menorrhagia       Current Outpatient Medications   Medication Sig Dispense Refill     estradiol (ESTRACE) 0.1 MG/GM vaginal cream 1 gram vaginally at bedtime daily for two weeks and then change to twice weekly. 42.5 g 11     lactobacillus rhamnosus (GG) (CULTURELL) capsule Take 1 capsule by mouth 2  times daily         Allergies   Allergen Reactions     Effexor [Venlafaxine] Anxiety       Social History     Socioeconomic History     Marital status:      Spouse name: Not on file     Number of children: Not on file     Years of education: Not on file     Highest education level: Not on file   Occupational History     Not on file   Tobacco Use     Smoking status: Never     Smokeless tobacco: Never   Vaping Use     Vaping Use: Never used   Substance and Sexual Activity     Alcohol use: Yes     Comment: couple glasses of wine a week     Drug use: No     Sexual activity: Yes     Partners: Male     Birth control/protection: Female Surgical   Other Topics Concern     Parent/sibling w/ CABG, MI or angioplasty before 65F 55M? No   Social History Narrative     Not on file     Social Determinants of Health     Financial Resource Strain: Not on file   Food Insecurity: Not on file   Transportation Needs: Not on file   Physical Activity: Not on file   Stress: Not on file   Social Connections: Not on file   Intimate Partner Violence: Not on file   Housing Stability: Not on file       Family History   Problem Relation Age of Onset     Family History Negative Mother      Family History Negative Father        Objective:     Appears AAO x 3  No vitals obtained due to virtual visit    Labs   Most Recent 3 CBC's:Recent Labs   Lab Test 11/30/22  0907 10/31/22  1647 01/12/17  1043   WBC 4.9 6.8 5.1   HGB 14.7 15.1 15.5   MCV 93 99 94    208 173     Most Recent 3 BMP's:Recent Labs   Lab Test 12/06/22  0958 11/30/22  0907 10/31/22  1647    139 139   POTASSIUM 4.1 4.2 4.1   CHLORIDE 103 103 101   CO2 26 23 26   BUN 10.5 17.6 16.5   CR 0.66 0.61 0.66   ANIONGAP 11 13 12   VIKTORIYA 10.0 9.7 9.9   * 108* 111*     Most Recent Urinalysis:Recent Labs   Lab Test 11/08/22  0919   COLOR Yellow   APPEARANCE Clear   URINEGLC Negative   URINEBILI Negative   URINEKETONE 15*   SG 1.010   UBLD Moderate*   URINEPH 6.0   PROTEIN  Negative   UROBILINOGEN 0.2   NITRITE Negative   LEUKEST Trace*   RBCU 0-2   WBCU 5-10*     Acute Labs   Urine Culture    Culture   Date Value Ref Range Status   11/08/2022 No Growth  Final   10/31/2022 10,000-50,000 CFU/mL Mixture of urogenital albino  Final   10/18/2022 >100,000 CFU/mL Proteus mirabilis (A)  Final

## 2023-01-16 ENCOUNTER — MYC MEDICAL ADVICE (OUTPATIENT)
Dept: FAMILY MEDICINE | Facility: CLINIC | Age: 58
End: 2023-01-16
Payer: COMMERCIAL

## 2023-01-19 NOTE — TELEPHONE ENCOUNTER
[employer] message sent to patient.     Shae DIEGO RN   [facility] Triage

## 2023-01-27 ENCOUNTER — LAB (OUTPATIENT)
Dept: LAB | Facility: CLINIC | Age: 58
End: 2023-01-27
Payer: COMMERCIAL

## 2023-01-27 ENCOUNTER — TRANSFERRED RECORDS (OUTPATIENT)
Dept: HEALTH INFORMATION MANAGEMENT | Facility: CLINIC | Age: 58
End: 2023-01-27

## 2023-01-27 DIAGNOSIS — N20.0 KIDNEY STONE: Primary | ICD-10-CM

## 2023-01-27 DIAGNOSIS — N20.0 CALCULUS OF KIDNEY: ICD-10-CM

## 2023-01-27 DIAGNOSIS — R74.8 ELEVATED LIVER ENZYMES: ICD-10-CM

## 2023-01-27 LAB
ALBUMIN SERPL BCG-MCNC: 4.8 G/DL (ref 3.5–5.2)
ALP SERPL-CCNC: 79 U/L (ref 35–104)
ALT SERPL W P-5'-P-CCNC: 54 U/L (ref 10–35)
ANION GAP SERPL CALCULATED.3IONS-SCNC: 13 MMOL/L (ref 7–15)
AST SERPL W P-5'-P-CCNC: 37 U/L (ref 10–35)
BILIRUB DIRECT SERPL-MCNC: <0.2 MG/DL (ref 0–0.3)
BILIRUB SERPL-MCNC: 0.5 MG/DL
BUN SERPL-MCNC: 16.2 MG/DL (ref 6–20)
CA-I BLD-MCNC: 5 MG/DL (ref 4.4–5.2)
CALCIUM SERPL-MCNC: 9.5 MG/DL (ref 8.6–10)
CHLORIDE SERPL-SCNC: 105 MMOL/L (ref 98–107)
CREAT SERPL-MCNC: 0.7 MG/DL (ref 0.51–0.95)
DEPRECATED HCO3 PLAS-SCNC: 24 MMOL/L (ref 22–29)
GFR SERPL CREATININE-BSD FRML MDRD: >90 ML/MIN/1.73M2
GLUCOSE SERPL-MCNC: 104 MG/DL (ref 70–99)
PHOSPHATE SERPL-MCNC: 3.7 MG/DL (ref 2.5–4.5)
POTASSIUM SERPL-SCNC: 4.3 MMOL/L (ref 3.4–5.3)
PROT SERPL-MCNC: 7.4 G/DL (ref 6.4–8.3)
PTH-INTACT SERPL-MCNC: 39 PG/ML (ref 15–65)
SODIUM SERPL-SCNC: 142 MMOL/L (ref 136–145)

## 2023-01-27 PROCEDURE — 36415 COLL VENOUS BLD VENIPUNCTURE: CPT

## 2023-01-27 PROCEDURE — 84100 ASSAY OF PHOSPHORUS: CPT

## 2023-01-27 PROCEDURE — 82330 ASSAY OF CALCIUM: CPT

## 2023-01-27 PROCEDURE — 83970 ASSAY OF PARATHORMONE: CPT

## 2023-01-27 PROCEDURE — 80053 COMPREHEN METABOLIC PANEL: CPT

## 2023-01-27 PROCEDURE — 82248 BILIRUBIN DIRECT: CPT

## 2023-01-30 NOTE — RESULT ENCOUNTER NOTE
Dear Valery,    Here is a summary of your recent test results:    -Liver and gallbladder tests (ALT,AST, Alk phos,bilirubin) have improved.     For additional lab test information, labtestsonline.[email or web address] is an excellent reference.    In addition, here is a list of due or overdue Health Maintenance reminders:    PHQ-2 (once per calendar year) due on 01/01/2023    Please call us at [phone] (or use [facility]) to address the above recommendations if needed.    Thank you for choosing [facility].  It was an honor and a privilege to participate in your care.       Healthy regards,    Camila Pederson, CLAUDIOP  [facility]

## 2023-02-16 ENCOUNTER — VIRTUAL VISIT (OUTPATIENT)
Dept: UROLOGY | Facility: CLINIC | Age: 58
End: 2023-02-16
Payer: COMMERCIAL

## 2023-02-16 DIAGNOSIS — N20.0 CALCULUS OF KIDNEY: Primary | ICD-10-CM

## 2023-02-16 PROCEDURE — 99214 OFFICE O/P EST MOD 30 MIN: CPT | Mod: VID | Performed by: UROLOGY

## 2023-02-16 RX ORDER — ACETAMINOPHEN 500 MG
1000 TABLET ORAL
Status: CANCELLED | OUTPATIENT
Start: 2023-02-16

## 2023-02-16 RX ORDER — GABAPENTIN 100 MG/1
300 CAPSULE ORAL
Status: CANCELLED | OUTPATIENT
Start: 2023-02-16

## 2023-02-16 RX ORDER — KETOROLAC TROMETHAMINE 30 MG/ML
15 INJECTION, SOLUTION INTRAMUSCULAR; INTRAVENOUS
Status: CANCELLED | OUTPATIENT
Start: 2023-02-16

## 2023-02-16 NOTE — H&P (VIEW-ONLY)
Assessment/Plan:    Assessment & Plan   Khushi was seen today for prevention.    Diagnoses and all orders for this visit:    Calculus of kidney  -     Patient Stated Goal: Know what to expect after surgery  -     Patient Stated Goal: Prevent further stones  -     Case Request: CYSTOURETEROSCOPY, WITH LASER LITHOTRIPSY, CALCULUS REMOVAL AND URETERAL STENT INSERTION; Standing  -     Case Request: CYSTOURETEROSCOPY, WITH LASER LITHOTRIPSY, CALCULUS REMOVAL AND URETERAL STENT INSERTION    Other orders  -     Forced Air Warming Device; Standing  -     Notify Provider - Anticoagulants and Antiplatelets; Standing  -     Glucose monitor nursing POCT; Standing  -     NPO per Anesthesia Guidelines for Procedure/Surgery Except for: Meds; Standing  -     Apply Pneumatic Compression Device (PCD); Standing  -     Pneumatic Compression Device (PCD) (Equipment); Standing  -     ceFAZolin (ANCEF) 2 g in sodium chloride 0.9 % 100 mL intermittent infusion  -     ceFAZolin (ANCEF) 2 g in sodium chloride 0.9 % 100 mL intermittent infusion  -     gabapentin (NEURONTIN) capsule 300 mg  -     ketorolac (TORADOL) injection 15 mg  -     acetaminophen (TYLENOL) tablet 1,000 mg  -     XR Surgery MEENA Fluoro Less Than 5 Min; Standing  -     XR Surgery MEENA Fluoro Less Than 5 Min        Stone Management Plan  Stone Management 11/10/2022 12/29/2022 2/16/2023   Urinary Tract Infection No suspicion of infection No suspicion of infection No suspicion of infection   Renal Colic Well controlled symptoms Asymptomatic at this time Asymptomatic at this time   Renal Failure No suspicion of renal failure No suspicion of renal failure No suspicion of renal failure   Current CT date 11/9/2022 - 11/9/2022   Right sided stones? Yes - No   R Number of ureteral stones 1 - -   R GSD of ureteral stones 4 - -   R Location of ureteral stone Distal - -   R Number of kidney stones  1 - -   R GSD of kidney stones 4 - 10 - -   R Hydronephrosis None - -   R Stone  Event Established event Resolved event No current event   Diagnosis date - - -   Initial location of primary symptomatic stone - - -   Initial GSD of primary symptomatic stone - - -   Resolved date - 12/29/2022 -   R Post-op status - No imaging -   R MET status Progression - -   R Current Plan Clear - -   MET - - -   Clear rationale Associated treated infection - -   Left sided stones? Yes - Yes   L Number of ureteral stones No ureteral stones - No ureteral stones   L Number of kidney stones  Renal stones unchanged from last exam - 2   L GSD of kidney stones - - 4 - 10   L Hydronephrosis - - None   L Stone Event No current event No current event New event   Diagnosis date - - 11/9/2022   Initial location of primary symptomatic stone - - Renal   Initial GSD of primary symptomatic stone - - 5   L Current Plan - - Clear   Clear rationale - - Patient preference   Observe rationale - - -             PLAN    Video call duration: 8 minutes  Patient location in [place]: Home  Distant site (provider site): Remote  13 minutes spent on the date of the encounter doing chart review, history and exam, documentation and further activities per the note    MARIA DEL CARMEN MOSHER MD  [facility]    HPI  Ms. Khushi Sawant is a 57 year old  female who is being evaluated via a billable video visit by [facility] for follow up of her stone disease.     She has been doing well in the interim. No current stone symptoms. She has completed risk evaluation.    Risk evaluation demonstrates excellent urine volume which she reports she has focused on since her right sided stone issues. No metabolic risks identified.    She would like to go forward and clear the left renal stones. Will set up urteeroscopic procedure at her early convenience. She is familiar with process from previous.    ROS   Review of systems is negative except for HPI.    No past medical history on  file.    Past Surgical History:   Procedure Laterality Date      SECTION       COLONOSCOPY N/A 2015    Procedure: COLONOSCOPY w/ placement of hemoclip x2;  Surgeon: Nena Anaya MD;  Location: [facility];  Service:      COMBINED CYSTOSCOPY, INSERT STENT URETER(S) Right 2022    Procedure: CYSTOURETEROSCOPY, WITH LASER LITHOTRIPSY, CALCULUS REMOVAL AND URETERAL STENT INSERTION;  Surgeon: Garfield Paz MD;  Location: [facility] OR     HYSTERECTOMY      for menorrhagia       Current Outpatient Medications   Medication Sig Dispense Refill     estradiol (ESTRACE) 0.1 MG/GM vaginal cream 1 gram vaginally at bedtime daily for two weeks and then change to twice weekly. 42.5 g 11     lactobacillus rhamnosus (GG) (CULTURELL) capsule Take 1 capsule by mouth 2 times daily         Allergies   Allergen Reactions     Effexor [Venlafaxine] Anxiety       Social History     Socioeconomic History     Marital status:      Spouse name: Not on file     Number of children: Not on file     Years of education: Not on file     Highest education level: Not on file   Occupational History     Not on file   Tobacco Use     Smoking status: Never     Smokeless tobacco: Never   Vaping Use     Vaping Use: Never used   Substance and Sexual Activity     Alcohol use: Yes     Comment: couple glasses of wine a week     Drug use: No     Sexual activity: Yes     Partners: Male     Birth control/protection: Female Surgical   Other Topics Concern     Parent/sibling w/ CABG, MI or angioplasty before 65F 55M? No   Social History Narrative     Not on file     Social Determinants of Health     Financial Resource Strain: Not on file   Food Insecurity: Not on file   Transportation Needs: Not on file   Physical Activity: Not on file   Stress: Not on file   Social Connections: Not on file   Intimate Partner Violence: Not on file   Housing Stability: Not on file       Family History   Problem Relation Age of Onset      Family History Negative Mother      Family History Negative Father        Objective:     Appears AAO x 3  No vitals obtained due to virtual visit    Labs   Most Recent 3 CBC's:Recent Labs   Lab Test 11/30/22  0907 10/31/22  1647 01/12/17  1043   WBC 4.9 6.8 5.1   HGB 14.7 15.1 15.5   MCV 93 99 94    208 173     Most Recent 3 BMP's:Recent Labs   Lab Test 01/27/23  0936 12/06/22  0958 11/30/22  0907    140 139   POTASSIUM 4.3 4.1 4.2   CHLORIDE 105 103 103   CO2 24 26 23   BUN 16.2 10.5 17.6   CR 0.70 0.66 0.61   ANIONGAP 13 11 13   VIKTORIYA 9.5 10.0 9.7   * 102* 108*     Most Recent Urinalysis:Recent Labs   Lab Test 11/08/22  0919   COLOR Yellow   APPEARANCE Clear   URINEGLC Negative   URINEBILI Negative   URINEKETONE 15*   SG 1.010   UBLD Moderate*   URINEPH 6.0   PROTEIN Negative   UROBILINOGEN 0.2   NITRITE Negative   LEUKEST Trace*   RBCU 0-2   WBCU 5-10*     Acute Labs   Urine Culture    Culture   Date Value Ref Range Status   11/08/2022 No Growth  Final   10/31/2022 10,000-50,000 CFU/mL Mixture of urogenital albino  Final   10/18/2022 >100,000 CFU/mL Proteus mirabilis (A)  Final

## 2023-02-16 NOTE — PROGRESS NOTES
Patient is roomed via telephone for a telehealth visit.  Patient confirmed she is in the [facility] at the time of this appointment.  Patient understand that this visit is billable and agree to proceed with appointment.

## 2023-02-16 NOTE — PATIENT INSTRUCTIONS
Patient Stated Goal: Know what to expect after surgery  Patient Stated Goal: Prevent further stones  INCREASING FLUIDS TO DECREASE RISK    Low Urinary Volume:     Kidney stones form because there is not enough water to dissolve the concentrated chemicals and minerals in urine.     Studies have found that people who make kidney stones should drink enough fluids so that they produce at least 2 liters (2 quarts) of urine per day.     Studies have shown that virtually every fluid you might drink decreases the risk of stone formation. Acceptable fluids include milk, coffee, diet and regular sodas, alcoholic beverages, fruit juices and even plain old water.    Increasing fluid intake will increase urine volume and decrease the chance of kidney stone formation.    Fluids:    Anything liquid that fits in a glass counts.     One way to gauge if your body has enough fluids is to check the color of your urine. If the urine is dark and yellow you do not have enough fluids. Urine will be pale yellow to clear if your body has enough fluids.    What we need to survive:    Most fluid we drink is lost through evaporation, more if active in hot humid environments    Body wastes can be cleared in a small amount of urine    All fluid that is consumed in excess of necessary losses  dilutes the urine    Ways to Increase Fluids Daily:    Drink more fluids throughout the day and into the evening. (You may need to awake from sleep to urinate which is a good indication of volume status)    Keep your refrigerator stocked with beverages you like    Drink a variety of fluids - mix it up    Add another beverage to your regular beverage at every meal    Keep a beverage at your desk (work area) and finish it before you leave for breaks, meetings, lunch and end of day    Use larger volume glasses    Whenever you pass a water fountain - stop and drink    Drink a beverage with snacks, if it is a salty snack, double the beverage    Take medication  with a full glass of water/fluid    Keep a bottle of water in your car and drink every 20 miles    Eat high water content fruits (melons, oranges, grapes, etc.)    Flavor water with a squeeze of lemon or lime or Crystal Light     If you do something repetitive throughout the day, link it with having something to drink    When you give your child/children something to drink, you have something to drink also    [facility] can respond to your questions or concerns 24 hours a day at [phone].      Ureteroscopy    Ureteroscopy is a procedure which is done for clearance of stones from the ureter, kidney or both. There are no incisions involved. The procedure involves your surgeon placing a small scope into your urethra. This is the opening where urine leaves your body.  The surgeon watches as they carefully guide the scope to the stone(s).  Modern flexible ureteroscopes can be used to reach virtually any location within the urinary tract.     The size, shape and location of the stone determines how best to treat the stone(s).  Whenever possible, stones are removed in one piece.  Larger stones need to be broken using a laser before removing in smaller pieces.  The goal is to remove all stones and stone fragments from that side of the body in a single treatment.  Complete stone clearance is an important step to prevent future kidney stone episodes.    Surgery:    Same day outpatient procedure    30-60 minutes    Procedure done in hospital surgical suite    General anesthesia (you will be asleep during the procedure)     Antibiotic prior to surgery to prevent infection    Physician will visit with you and respond to any questions or concerns and consent will be signed prior to going to the operating room    Risks:    Infection - Preoperative antibiotics should prevent new infections but it is possible that unanticipated bacteria may be introduced at time of surgery or that the stones were actually  chronically infected before surgery      Injury - The ureter may be injured during this procedure.  This is most likely to happen if the ureter was very inflamed before surgery or if a stone is very tightly impacted.  The surgeon will not aggressively treat a stone if this creates a risk of injury.        Inaccessible Stones -A single procedure is effective in 95% of cases, but if your ureter is very narrow or your kidney stone is very impacted, a stent will be placed and the procedure stopped.  In 1-2 weeks after the ureter has relaxed, the patient will be brought back to surgery and the procedure can be safely performed.      Incomplete stone clearance -Occasionally stone or stone fragments may not be completely cleared.  These may pass on their own, which may cause discomfort.  Our goal is to remove all possible stones and fragments.    Stent:      An internal soft tube will be placed between the kidney and the bladder while in surgery (after the stone is cleared). The stent will keep the kidney draining.    What should I expect?     It is common for a stent to cause some irritation and discomfort.   You may have:      The need to urinate suddenly     The need to urinate often     Pain during urination     A dull backache, which may get worse during urination     Blood stained urine (like fruit punch) and occasional small clots    It s important to remember the stent is necessary and only temporary. To feel more comfortable:      Drink more than you normally would but you do not have to constantly  flush your kidneys     Limiting your activity may decrease irritation or bleeding    Ibuprofen - 2 tablets every 6-8 hours     Use pain medications as directed.    When is the stent removed?    Most stents are removed within 5 days to 2 weeks after a procedure.     How is the stent removed?     Your stent will be removed in [facility] with a small telescope and a grasping tool.  It usually takes less than  1 minute to remove the stent.    What should I expect after the stent is removed?     You should feel normal by the next day    Some patients find:    An increase in back pain about an hour after the stent is removed as the kidney fills up with urine before it starts to empty.  It can be as uncomfortable as your initial stone episode.  Taking pain medications before stent removal may be helpful, but you would need someone else to drive you to and from your appointment.    Bladder symptoms usually disappear by the next morning.    Small amounts of blood in the urine may be seen occasionally for up to a week.    Diet:      After surgery, there are no dietary restrictions - Drink to thirst, there is no need to increase intake of fluids, as this may increase nausea symptoms. Try to eat smaller, more frequent snacks, instead of large meals.    Activity:    Many people return to work within 1-2 days. Fatigue is normal for a couple of weeks following surgery. With increased activity you may experience more discomfort and you may notice more blood in your urine.      Post-Operative Symptom Control    While you recover from your procedure, you can take steps to ease your recovery.    Medications that prevent further episodes of severe pain and help stones pass: Take these as prescribed on a regular basis even if you are NOT in pain      Ibuprofen (Advil or Motrin) - Is available over the counter Take 2 (200mg) tablets every 6 hours until the stone passes.  o prevents spasm of the ureter.    o Decreases pain      Dramamine - (drowsy version, non-generic formulation) Is available over the counter and decreases spasm of the ureter.  Take 50mg at bedtime every night until the stone passes. In addition, take every 6 hours as needed.  Dramamine:  o Decreases nausea  o Decreases acute pain  o Decreases recurrence of pain for next 24 hours  o Will help you sleep        *This medication will cause increased drowsiness, do not drive  or operate machinery for 6 hours      Flomax- Studies show that Flomax decreases irritation from stents.   o Take every day with food until stone passes even if you do not have pain  o Flomax does not relieve pain.        *This medication may cause nasal congestion or light-headedness      Detrol ( Tolterodine) - After surgery Detrol may decrease stent irritation and pelvic pain  o Take as prescribed     *This medication may cause dry mouth, constipation or blurry vision. Stop medication if unable to urinate.    Medication that are taken as needed to manage break through symptoms: Take these ONLY as required and hopefully not at all      Narcotics (Percocet, Vicodin, Dilaudid)- take as prescribed for severe pain unrelieved by ibuprofen and dramamine  o Take as prescribed for severe pain  o Narcotics have significant side effects and only  cover-up  pain. They have no effect on cause of pain.  o Common side effects:  - Confusion, disorientation and sedation - DO NOT DRIVE OR OPERATE MACHINERY WITHIN 24 HOURS  - Nausea - take Dramamine or Zofran  or Haldol to help control  - Constipation  - Sleep disturbances      Ondansetron (Zofran)-  o Take as prescribed  o Reserve for severe nausea  o May cause constipation, start over the counter Miralax if needed to treat this    Haldol-  o Take as prescribed  o Reserve for severe nausea    Warning Signs/Symptoms - Please call the [facility] 24 hours a day at [phone] IMMEDIATELY if you experience any of these:    Fever greater than 100.1     Chills    Pain NOT CONTROLLED by pain medications    Heavy bleeding or large clots in urine (small clots can be normal)    Persistent nausea and/or vomiting    Post-Operative Follow up:    The stone(s) will be sent from surgery to a lab for composition analysis.  These results are usually available before a one month post-operative visit.  If you had laser treatment to break up your stone, you will usually be scheduled for a  low dose CT scan prior to your one month appointment.  This scan allows your surgeon to confirm that all stone fragments were cleared at time of surgery and that there have been no complications.  These results along with possible labs and urine studies will help us develop an individualized plan to prevent new stones from forming and keep existing stones from enlarging.  This visit is usually scheduled about 1 month after your original surgery.    [facility] can respond to your questions or concerns 24 hours a day at [phone].

## 2023-02-16 NOTE — PROGRESS NOTES
Assessment/Plan:    Assessment & Plan   Khushi was seen today for prevention.    Diagnoses and all orders for this visit:    Calculus of kidney  -     Patient Stated Goal: Know what to expect after surgery  -     Patient Stated Goal: Prevent further stones  -     Case Request: CYSTOURETEROSCOPY, WITH LASER LITHOTRIPSY, CALCULUS REMOVAL AND URETERAL STENT INSERTION; Standing  -     Case Request: CYSTOURETEROSCOPY, WITH LASER LITHOTRIPSY, CALCULUS REMOVAL AND URETERAL STENT INSERTION    Other orders  -     Forced Air Warming Device; Standing  -     Notify Provider - Anticoagulants and Antiplatelets; Standing  -     Glucose monitor nursing POCT; Standing  -     NPO per Anesthesia Guidelines for Procedure/Surgery Except for: Meds; Standing  -     Apply Pneumatic Compression Device (PCD); Standing  -     Pneumatic Compression Device (PCD) (Equipment); Standing  -     ceFAZolin (ANCEF) 2 g in sodium chloride 0.9 % 100 mL intermittent infusion  -     ceFAZolin (ANCEF) 2 g in sodium chloride 0.9 % 100 mL intermittent infusion  -     gabapentin (NEURONTIN) capsule 300 mg  -     ketorolac (TORADOL) injection 15 mg  -     acetaminophen (TYLENOL) tablet 1,000 mg  -     XR Surgery MEENA Fluoro Less Than 5 Min; Standing  -     XR Surgery MEENA Fluoro Less Than 5 Min        Stone Management Plan  Stone Management 11/10/2022 12/29/2022 2/16/2023   Urinary Tract Infection No suspicion of infection No suspicion of infection No suspicion of infection   Renal Colic Well controlled symptoms Asymptomatic at this time Asymptomatic at this time   Renal Failure No suspicion of renal failure No suspicion of renal failure No suspicion of renal failure   Current CT date 11/9/2022 - 11/9/2022   Right sided stones? Yes - No   R Number of ureteral stones 1 - -   R GSD of ureteral stones 4 - -   R Location of ureteral stone Distal - -   R Number of kidney stones  1 - -   R GSD of kidney stones 4 - 10 - -   R Hydronephrosis None - -   R Stone  Event Established event Resolved event No current event   Diagnosis date - - -   Initial location of primary symptomatic stone - - -   Initial GSD of primary symptomatic stone - - -   Resolved date - 12/29/2022 -   R Post-op status - No imaging -   R MET status Progression - -   R Current Plan Clear - -   MET - - -   Clear rationale Associated treated infection - -   Left sided stones? Yes - Yes   L Number of ureteral stones No ureteral stones - No ureteral stones   L Number of kidney stones  Renal stones unchanged from last exam - 2   L GSD of kidney stones - - 4 - 10   L Hydronephrosis - - None   L Stone Event No current event No current event New event   Diagnosis date - - 11/9/2022   Initial location of primary symptomatic stone - - Renal   Initial GSD of primary symptomatic stone - - 5   L Current Plan - - Clear   Clear rationale - - Patient preference   Observe rationale - - -             PLAN    Video call duration: 8 minutes  Patient location in [place]: Home  Distant site (provider site): Remote  13 minutes spent on the date of the encounter doing chart review, history and exam, documentation and further activities per the note    MARIA DEL CARMEN MOSHER MD  [facility]    HPI  Ms. Khushi Sawant is a 57 year old  female who is being evaluated via a billable video visit by [facility] [place] for follow up of her stone disease.     She has been doing well in the interim. No current stone symptoms. She has completed risk evaluation.    Risk evaluation demonstrates excellent urine volume which she reports she has focused on since her right sided stone issues. No metabolic risks identified.    She would like to go forward and clear the left renal stones. Will set up urteeroscopic procedure at her early convenience. She is familiar with process from previous.    ROS   Review of systems is negative except for HPI.    No past medical history on  file.    Past Surgical History:   Procedure Laterality Date      SECTION       COLONOSCOPY N/A 2015    Procedure: COLONOSCOPY w/ placement of hemoclip x2;  Surgeon: Nena Anaya MD;  Location: [facility];  Service:      COMBINED CYSTOSCOPY, INSERT STENT URETER(S) Right 2022    Procedure: CYSTOURETEROSCOPY, WITH LASER LITHOTRIPSY, CALCULUS REMOVAL AND URETERAL STENT INSERTION;  Surgeon: Garfield Paz MD;  [facility] OR     HYSTERECTOMY      for menorrhagia       Current Outpatient Medications   Medication Sig Dispense Refill     estradiol (ESTRACE) 0.1 MG/GM vaginal cream 1 gram vaginally at bedtime daily for two weeks and then change to twice weekly. 42.5 g 11     lactobacillus rhamnosus (GG) (CULTURELL) capsule Take 1 capsule by mouth 2 times daily         Allergies   Allergen Reactions     Effexor [Venlafaxine] Anxiety       Social History     Socioeconomic History     Marital status:      Spouse name: Not on file     Number of children: Not on file     Years of education: Not on file     Highest education level: Not on file   Occupational History     Not on file   Tobacco Use     Smoking status: Never     Smokeless tobacco: Never   Vaping Use     Vaping Use: Never used   Substance and Sexual Activity     Alcohol use: Yes     Comment: couple glasses of wine a week     Drug use: No     Sexual activity: Yes     Partners: Male     Birth control/protection: Female Surgical   Other Topics Concern     Parent/sibling w/ CABG, MI or angioplasty before 65F 55M? No   Social History Narrative     Not on file     Social Determinants of Health     Financial Resource Strain: Not on file   Food Insecurity: Not on file   Transportation Needs: Not on file   Physical Activity: Not on file   Stress: Not on file   Social Connections: Not on file   Intimate Partner Violence: Not on file   Housing Stability: Not on file       Family History   Problem Relation Age of Onset      Family History Negative Mother      Family History Negative Father        Objective:     Appears AAO x 3  No vitals obtained due to virtual visit    Labs   Most Recent 3 CBC's:Recent Labs   Lab Test 11/30/22  0907 10/31/22  1647 01/12/17  1043   WBC 4.9 6.8 5.1   HGB 14.7 15.1 15.5   MCV 93 99 94    208 173     Most Recent 3 BMP's:Recent Labs   Lab Test 01/27/23  0936 12/06/22  0958 11/30/22  0907    140 139   POTASSIUM 4.3 4.1 4.2   CHLORIDE 105 103 103   CO2 24 26 23   BUN 16.2 10.5 17.6   CR 0.70 0.66 0.61   ANIONGAP 13 11 13   VIKTORIYA 9.5 10.0 9.7   * 102* 108*     Most Recent Urinalysis:Recent Labs   Lab Test 11/08/22  0919   COLOR Yellow   APPEARANCE Clear   URINEGLC Negative   URINEBILI Negative   URINEKETONE 15*   SG 1.010   UBLD Moderate*   URINEPH 6.0   PROTEIN Negative   UROBILINOGEN 0.2   NITRITE Negative   LEUKEST Trace*   RBCU 0-2   WBCU 5-10*     Acute Labs   Urine Culture    Culture   Date Value Ref Range Status   11/08/2022 No Growth  Final   10/31/2022 10,000-50,000 CFU/mL Mixture of urogenital albino  Final   10/18/2022 >100,000 CFU/mL Proteus mirabilis (A)  Final

## 2023-02-17 ENCOUNTER — TELEPHONE (OUTPATIENT)
Dept: UROLOGY | Facility: CLINIC | Age: 58
End: 2023-02-17
Payer: COMMERCIAL

## 2023-02-17 NOTE — TELEPHONE ENCOUNTER
Spoke with: Patient       Date of surgery: Friday Feb 24 2023       Location: [facility]      Informed patient they will need a adult : YES      Pre op with provider: Dr Paz will do DOS       H&P Scheduled in [facility]        Pre procedure covid : Patient knows to do a home covis test 1-2 days prior take photo of Neg test and bring in with her DOS       Additional imaging: NA        Surgery Packet : Patient given info over phone    Additional comments:

## 2023-02-18 PROBLEM — N20.0 KIDNEY STONE: Status: ACTIVE | Noted: 2022-10-31

## 2023-02-27 ENCOUNTER — ANESTHESIA EVENT (OUTPATIENT)
Dept: SURGERY | Facility: AMBULATORY SURGERY CENTER | Age: 58
End: 2023-02-27
Payer: COMMERCIAL

## 2023-02-27 RX ORDER — ACETAMINOPHEN 325 MG/1
975 TABLET ORAL ONCE
Status: CANCELLED | OUTPATIENT
Start: 2023-02-27 | End: 2023-02-27

## 2023-02-28 ENCOUNTER — TELEPHONE (OUTPATIENT)
Dept: UROLOGY | Facility: CLINIC | Age: 58
End: 2023-02-28

## 2023-02-28 ENCOUNTER — HOSPITAL ENCOUNTER (OUTPATIENT)
Facility: AMBULATORY SURGERY CENTER | Age: 58
Discharge: HOME | End: 2023-02-28
Attending: UROLOGY
Payer: COMMERCIAL

## 2023-02-28 ENCOUNTER — ANESTHESIA (OUTPATIENT)
Dept: SURGERY | Facility: AMBULATORY SURGERY CENTER | Age: 58
End: 2023-02-28
Payer: COMMERCIAL

## 2023-02-28 VITALS
HEIGHT: 67 IN | BODY MASS INDEX: 26.06 KG/M2 | OXYGEN SATURATION: 100 % | RESPIRATION RATE: 16 BRPM | HEART RATE: 60 BPM | TEMPERATURE: 97.4 F | SYSTOLIC BLOOD PRESSURE: 148 MMHG | DIASTOLIC BLOOD PRESSURE: 76 MMHG | WEIGHT: 166 LBS

## 2023-02-28 PROCEDURE — 52332 CYSTOSCOPY AND TREATMENT: CPT | Mod: LT | Performed by: UROLOGY

## 2023-02-28 PROCEDURE — 52352 CYSTOURETERO W/STONE REMOVE: CPT | Mod: LT | Performed by: UROLOGY

## 2023-02-28 DEVICE — URETERAL STENT
Type: IMPLANTABLE DEVICE | Site: URETER | Status: FUNCTIONAL
Brand: PERCUFLEX™ PLUS

## 2023-02-28 RX ORDER — CEFAZOLIN SODIUM 2 G/100ML
2 INJECTION, SOLUTION INTRAVENOUS SEE ADMIN INSTRUCTIONS
Status: DISCONTINUED | OUTPATIENT
Start: 2023-02-28 | End: 2023-03-01 | Stop reason: HOSPADM

## 2023-02-28 RX ORDER — GABAPENTIN 300 MG/1
300 CAPSULE ORAL
Status: COMPLETED | OUTPATIENT
Start: 2023-02-28 | End: 2023-02-28

## 2023-02-28 RX ORDER — ONDANSETRON 2 MG/ML
4 INJECTION INTRAMUSCULAR; INTRAVENOUS EVERY 30 MIN PRN
Status: DISCONTINUED | OUTPATIENT
Start: 2023-02-28 | End: 2023-03-01 | Stop reason: HOSPADM

## 2023-02-28 RX ORDER — FENTANYL CITRATE 0.05 MG/ML
25 INJECTION, SOLUTION INTRAMUSCULAR; INTRAVENOUS
Status: DISCONTINUED | OUTPATIENT
Start: 2023-02-28 | End: 2023-03-01 | Stop reason: HOSPADM

## 2023-02-28 RX ORDER — ACETAMINOPHEN 500 MG
1000 TABLET ORAL
Status: COMPLETED | OUTPATIENT
Start: 2023-02-28 | End: 2023-02-28

## 2023-02-28 RX ORDER — ACETAMINOPHEN 325 MG/1
975 TABLET ORAL ONCE
Status: DISCONTINUED | OUTPATIENT
Start: 2023-02-28 | End: 2023-03-01 | Stop reason: HOSPADM

## 2023-02-28 RX ORDER — HYDROMORPHONE HCL IN WATER/PF 6 MG/30 ML
0.2 PATIENT CONTROLLED ANALGESIA SYRINGE INTRAVENOUS EVERY 5 MIN PRN
Status: DISCONTINUED | OUTPATIENT
Start: 2023-02-28 | End: 2023-03-01 | Stop reason: HOSPADM

## 2023-02-28 RX ORDER — PROPOFOL 10 MG/ML
INJECTION, EMULSION INTRAVENOUS PRN
Status: DISCONTINUED | OUTPATIENT
Start: 2023-02-28 | End: 2023-02-28

## 2023-02-28 RX ORDER — FENTANYL CITRATE 0.05 MG/ML
50 INJECTION, SOLUTION INTRAMUSCULAR; INTRAVENOUS EVERY 5 MIN PRN
Status: DISCONTINUED | OUTPATIENT
Start: 2023-02-28 | End: 2023-03-01 | Stop reason: HOSPADM

## 2023-02-28 RX ORDER — GLYCOPYRROLATE 0.2 MG/ML
INJECTION, SOLUTION INTRAMUSCULAR; INTRAVENOUS PRN
Status: DISCONTINUED | OUTPATIENT
Start: 2023-02-28 | End: 2023-02-28

## 2023-02-28 RX ORDER — FENTANYL CITRATE 50 UG/ML
INJECTION, SOLUTION INTRAMUSCULAR; INTRAVENOUS PRN
Status: DISCONTINUED | OUTPATIENT
Start: 2023-02-28 | End: 2023-02-28

## 2023-02-28 RX ORDER — CEFAZOLIN SODIUM 2 G/100ML
2 INJECTION, SOLUTION INTRAVENOUS
Status: COMPLETED | OUTPATIENT
Start: 2023-02-28 | End: 2023-02-28

## 2023-02-28 RX ORDER — PROPOFOL 10 MG/ML
INJECTION, EMULSION INTRAVENOUS CONTINUOUS PRN
Status: DISCONTINUED | OUTPATIENT
Start: 2023-02-28 | End: 2023-02-28

## 2023-02-28 RX ORDER — OXYCODONE HYDROCHLORIDE 10 MG/1
10 TABLET ORAL
Status: DISCONTINUED | OUTPATIENT
Start: 2023-02-28 | End: 2023-03-01 | Stop reason: HOSPADM

## 2023-02-28 RX ORDER — SODIUM CHLORIDE, SODIUM LACTATE, POTASSIUM CHLORIDE, CALCIUM CHLORIDE 600; 310; 30; 20 MG/100ML; MG/100ML; MG/100ML; MG/100ML
INJECTION, SOLUTION INTRAVENOUS CONTINUOUS
Status: DISCONTINUED | OUTPATIENT
Start: 2023-02-28 | End: 2023-03-01 | Stop reason: HOSPADM

## 2023-02-28 RX ORDER — KETOROLAC TROMETHAMINE 15 MG/ML
15 INJECTION, SOLUTION INTRAMUSCULAR; INTRAVENOUS
Status: DISCONTINUED | OUTPATIENT
Start: 2023-02-28 | End: 2023-03-01 | Stop reason: HOSPADM

## 2023-02-28 RX ORDER — ONDANSETRON 4 MG/1
4 TABLET, ORALLY DISINTEGRATING ORAL EVERY 30 MIN PRN
Status: DISCONTINUED | OUTPATIENT
Start: 2023-02-28 | End: 2023-03-01 | Stop reason: HOSPADM

## 2023-02-28 RX ORDER — HYDROMORPHONE HCL IN WATER/PF 6 MG/30 ML
0.4 PATIENT CONTROLLED ANALGESIA SYRINGE INTRAVENOUS EVERY 5 MIN PRN
Status: DISCONTINUED | OUTPATIENT
Start: 2023-02-28 | End: 2023-03-01 | Stop reason: HOSPADM

## 2023-02-28 RX ORDER — OXYCODONE HYDROCHLORIDE 5 MG/1
5 TABLET ORAL
Status: DISCONTINUED | OUTPATIENT
Start: 2023-02-28 | End: 2023-03-01 | Stop reason: HOSPADM

## 2023-02-28 RX ORDER — ONDANSETRON 2 MG/ML
INJECTION INTRAMUSCULAR; INTRAVENOUS PRN
Status: DISCONTINUED | OUTPATIENT
Start: 2023-02-28 | End: 2023-02-28

## 2023-02-28 RX ORDER — LIDOCAINE 40 MG/G
CREAM TOPICAL
Status: DISCONTINUED | OUTPATIENT
Start: 2023-02-28 | End: 2023-03-01 | Stop reason: HOSPADM

## 2023-02-28 RX ORDER — FENTANYL CITRATE 0.05 MG/ML
25 INJECTION, SOLUTION INTRAMUSCULAR; INTRAVENOUS EVERY 5 MIN PRN
Status: DISCONTINUED | OUTPATIENT
Start: 2023-02-28 | End: 2023-03-01 | Stop reason: HOSPADM

## 2023-02-28 RX ADMIN — KETOROLAC TROMETHAMINE 15 MG: 15 INJECTION, SOLUTION INTRAMUSCULAR; INTRAVENOUS at 07:12

## 2023-02-28 RX ADMIN — SODIUM CHLORIDE, SODIUM LACTATE, POTASSIUM CHLORIDE, CALCIUM CHLORIDE: 600; 310; 30; 20 INJECTION, SOLUTION INTRAVENOUS at 07:09

## 2023-02-28 RX ADMIN — PROPOFOL 200 MG: 10 INJECTION, EMULSION INTRAVENOUS at 08:13

## 2023-02-28 RX ADMIN — CEFAZOLIN SODIUM 2 G: 2 INJECTION, SOLUTION INTRAVENOUS at 08:06

## 2023-02-28 RX ADMIN — GLYCOPYRROLATE 0.2 MG: 0.2 INJECTION, SOLUTION INTRAMUSCULAR; INTRAVENOUS at 08:13

## 2023-02-28 RX ADMIN — FENTANYL CITRATE 50 MCG: 50 INJECTION, SOLUTION INTRAMUSCULAR; INTRAVENOUS at 08:11

## 2023-02-28 RX ADMIN — GABAPENTIN 300 MG: 300 CAPSULE ORAL at 06:53

## 2023-02-28 RX ADMIN — ONDANSETRON 4 MG: 2 INJECTION INTRAMUSCULAR; INTRAVENOUS at 08:26

## 2023-02-28 RX ADMIN — Medication 1000 MG: at 06:53

## 2023-02-28 RX ADMIN — SODIUM CHLORIDE, SODIUM LACTATE, POTASSIUM CHLORIDE, CALCIUM CHLORIDE: 600; 310; 30; 20 INJECTION, SOLUTION INTRAVENOUS at 08:05

## 2023-02-28 RX ADMIN — FENTANYL CITRATE 50 MCG: 50 INJECTION, SOLUTION INTRAMUSCULAR; INTRAVENOUS at 08:38

## 2023-02-28 RX ADMIN — PROPOFOL 180 MCG/KG/MIN: 10 INJECTION, EMULSION INTRAVENOUS at 08:13

## 2023-02-28 NOTE — DISCHARGE INSTRUCTIONS
If you have any questions or concerns regarding your procedure, please contact Dr. Paz, his office number is [phone].      You received an IV medication today called Toradol. You received this medication at 0712. This is a NSAID. Therefore, do not take any NSAIDS (Ibuprofen products, Advil, Motrin, etc) until 1:12 pm.       You have received 1,000 mg of Acetaminophen (Tylenol) at 0653. Please do not take an additional dose of Tylenol until after 12:53 pm     Do not exceed 4,000 mg of acetaminophen during a 24 hour period and keep in mind that acetaminophen can also be found in many over-the-counter cold medications as well as narcotics that may be given for pain.         Going Home With a Ureteral Stent    What is it?  A stent is a soft, plastic tube that helps urine (pee) drain into the bladder.  During the surgery, it is placed in the ureter the tube that connects the kidney to the bladder.  A thin curl at each end of the stent keeps one end in your kidney and the other in your bladder.  The stent can not be seen from outside of the body.    Why Do I Have It?  Some sort of blockage is not letting pee drain into your bladder.  This could be from a stone, certain surgeries or kidney infection.    What Should I Expect?   Stents often cause some discomfort. You may have:    The need to pee suddenly    Pain when you pee    A dull backache, which may get worse when you pee  Blood in your pee (color of fruit punch) and some clots, which may increase with physical activity.     What Can I Do To Feel Better?    Drink a little more fluids than usual.  You can eat your normal diet.    Enjoy a warm bath.  Decrease your activity.  Some people find bending or twisting movement cause discomfort or increased blood in the urine.  Even so, you will not harm yourself.       Stent Removal With String    -Take Tylenol or Ibuprofen and drink fluids 1 hour prior to removing your stent at home.    -Remove the stent in the morning  "on the specific day as directed by your doctor.  Gently pull on the string in the shower while urinating until the stent is completely removed.      What To Expect After Your Stent Is Removed:    -After stent removal, urine may be bloody and possibly have some bloody clots.    -You may experience an \"achy\" pain due to urethral spasms.  This generally only lasts a few hours, but should resolve over the next 2-3 days    -Take Tylenol or Ibuprofen and drink fluids 1 hour prior to removing your stent at home.      "

## 2023-02-28 NOTE — ANESTHESIA POSTPROCEDURE EVALUATION
Patient: Khushi Sawant    Procedure: Procedure(s):  CYSTOURETEROSCOPY, RETROGRADE PYELOGRAM, AND URETERAL STENT INSERTION       Anesthesia Type:  General    Note:  Disposition: Outpatient   Postop Pain Control: Uneventful            Sign Out: Well controlled pain   PONV: No   Neuro/Psych: Uneventful            Sign Out: Acceptable/Baseline neuro status   Airway/Respiratory: Uneventful            Sign Out: Acceptable/Baseline resp. status   CV/Hemodynamics: Uneventful            Sign Out: Acceptable CV status; No obvious hypovolemia; No obvious fluid overload   Other NRE: NONE   DID A NON-ROUTINE EVENT OCCUR? No           Last vitals:  Vitals Value Taken Time   /67 02/28/23 0855   Temp 97  F (36.1  C) 02/28/23 0843   Pulse 67 02/28/23 0855   Resp 16 02/28/23 0855   SpO2 95 % 02/28/23 0855       Electronically Signed By: Tyree Bosch MD  February 28, 2023  9:16 AM

## 2023-02-28 NOTE — ANESTHESIA PREPROCEDURE EVALUATION
Anesthesia Pre-Procedure Evaluation    Patient: Khushi Sawant   MRN: [id number] : 1965        Procedure : Procedure(s):  CYSTOURETEROSCOPY, WITH LASER LITHOTRIPSY, CALCULUS REMOVAL AND URETERAL STENT INSERTION          History reviewed. No pertinent past medical history.   Past Surgical History:   Procedure Laterality Date      SECTION       COLONOSCOPY N/A 2015    Procedure: COLONOSCOPY w/ placement of hemoclip x2;  Surgeon: Nena Anaya MD;  Location: [facility];  Service:      COMBINED CYSTOSCOPY, INSERT STENT URETER(S) Right 2022    Procedure: CYSTOURETEROSCOPY, WITH LASER LITHOTRIPSY, CALCULUS REMOVAL AND URETERAL STENT INSERTION;  Surgeon: Garfield Paz MD;  Location: [facility] OR     HYSTERECTOMY      for menorrhagia      Allergies   Allergen Reactions     Effexor [Venlafaxine] Anxiety      Social History     Tobacco Use     Smoking status: Never     Smokeless tobacco: Never   Substance Use Topics     Alcohol use: Yes     Comment: couple glasses of wine a week      Wt Readings from Last 1 Encounters:   23 75.3 kg (166 lb)        Anesthesia Evaluation   Pt has had prior anesthetic. Type: General, MAC and Regional.        ROS/MED HX  ENT/Pulmonary:  - neg pulmonary ROS     Neurologic:  - neg neurologic ROS     Cardiovascular:  - neg cardiovascular ROS     METS/Exercise Tolerance: >4 METS    Hematologic:  - neg hematologic  ROS     Musculoskeletal:  - neg musculoskeletal ROS     GI/Hepatic:  - neg GI/hepatic ROS     Renal/Genitourinary:  - neg Renal ROS   (+) Nephrolithiasis ,     Endo:  - neg endo ROS     Psychiatric/Substance Use:  - neg psychiatric ROS     Infectious Disease:  - neg infectious disease ROS     Malignancy:  - neg malignancy ROS     Other:  - neg other ROS          Physical Exam    Airway  airway exam normal      Mallampati: II   TM distance: > 3 FB   Neck ROM: full     Respiratory Devices and Support         Dental       (+)  Completely normal teeth      Cardiovascular   cardiovascular exam normal          Pulmonary   pulmonary exam normal                OUTSIDE LABS:  CBC:   Lab Results   Component Value Date    WBC 4.9 11/30/2022    WBC 6.8 10/31/2022    HGB 14.7 11/30/2022    HGB 15.1 10/31/2022    HCT 44.0 11/30/2022    HCT 47.3 (H) 10/31/2022     11/30/2022     10/31/2022     BMP:   Lab Results   Component Value Date     01/27/2023     12/06/2022    POTASSIUM 4.3 01/27/2023    POTASSIUM 4.1 12/06/2022    CHLORIDE 105 01/27/2023    CHLORIDE 103 12/06/2022    CO2 24 01/27/2023    CO2 26 12/06/2022    BUN 16.2 01/27/2023    BUN 10.5 12/06/2022    CR 0.70 01/27/2023    CR 0.66 12/06/2022     (H) 01/27/2023     (H) 12/06/2022     COAGS: No results found for: PTT, INR, FIBR  POC:   Lab Results   Component Value Date    HCG Negative 06/03/2008     HEPATIC:   Lab Results   Component Value Date    ALBUMIN 4.8 01/27/2023    PROTTOTAL 7.4 01/27/2023    ALT 54 (H) 01/27/2023    AST 37 (H) 01/27/2023    ALKPHOS 79 01/27/2023    BILITOTAL 0.5 01/27/2023     OTHER:   Lab Results   Component Value Date    A1C 5.6 12/06/2022    VIKTORIYA 9.5 01/27/2023    PHOS 3.7 01/27/2023    TSH 0.49 12/06/2022    SED 16 10/31/2022       Anesthesia Plan    ASA Status:  1   NPO Status:  NPO Appropriate    Anesthesia Type: General.     - Airway: LMA   Induction: Propofol.   Maintenance: TIVA.        Consents    Anesthesia Plan(s) and associated risks, benefits, and realistic alternatives discussed. Questions answered and patient/representative(s) expressed understanding.    - Discussed:     - Discussed with:  Patient         Postoperative Care    Pain management: Multi-modal analgesia.   PONV prophylaxis: Ondansetron (or other 5HT-3), Dexamethasone or Solumedrol     Comments:    Other Comments: Reviewed anesthetic options and risks, including risk of dental trauma. Patient agrees to proceed.             Tyree Bosch MD

## 2023-02-28 NOTE — OP NOTE
[facility]  K[facility]ney [facility] [place] Operative Note    Khushi Sawant   February 28, 2023 2/28/2023   Camila Pederson     Procedure Performed  Procedure(s):  CYSTOURETEROSCOPY, RETROGRADE PYELOGRAM, AND URETERAL STENT INSERTION  1. Cystoscopy  2. Retrograde Pyelography - Left  3. Ureteroscopic Stone Extraction - Left Kidney    4. Ureteral Stent Insertion - Left      Pre-operative Diagnosis  Calculus of kidney [N20.0]    Post-operative Diagnosis  1. Stone Left Kidney        Surgeon(s) and Role:     * Garfield Paz MD - Primary    Anesthesia Type  Not documented     Procedural Summary    Estimation of stone clearance: known untreated stone  Subjective stone composition: calcium  Renal papillae assessment: plaques moderate  Unanticipated event/findings: stone trapped behind stenotic infundibulum  Post-operative plan: remove own stent in 6 days with extraction string return to clinic in 1 month with CT scan    Narrative    Target of today's procedure, a midpole left renal stone, is trapped behind a stenotic infundibulum. It is inaccessible to ureteroscopy. It is unlikely to pass. If it becomes symptomatic, it would require percutaneous clearance.    Procedural Details    Patient is brought to the surgical suite where anesthesia is induced. she is prepped and draped in standard fashion in combined Trendelenberg and lithotomy position.    Cystoscopy: Rigid cystoscopy is performed. Introitus is normal. Bladder is normal..     Retrograde Pyelography:  imaging demonstrates radio-opaque renal stone consistent with pre-operative imaging. Left retrograde pyelography demonstrates radio-opaque renal calculi consistent with pre-operative imaging.     Ureteral Access: Left ureteral access is initiated with Bentson wire. Guide wire access to the kidney is assured. 8F dilator is inserted with minimal resistance. 10F dilator is inserted with minimal resistance. 11F ureteral access sheath obturator is  inserted with minimal resistance. 13F 36 cm ureteral access sheath is inserted with minimal resistance.      Flexible Ureteroscopy: Flexible ureteroscope is passed to kidney. Left lower pole and mid pole stones are identified. Lower pole hypertrophic plaque is dislodged and cleared. The major midpole stone is trapped behind a stenotic infundibulum and is not accessible.    Ureteral Stent Insertion: Left 7F 24 cm stent is inserted with good coil in kidney and bladder under fluoroscopic guidance. Stent extraction string left dangling from urethra.      The patient was then taken to the recovery room in good condition.     History reviewed. No pertinent past medical history.     Patient Active Problem List   Diagnosis     Sessile colonic polyp 2015 -  normal 2018 [facility] - 5 year f/u recommended     S/P laparoscopic supracervical hysterectomy     History of Clostridioides difficile colitis     Kidney stone     Caliectasis        Estimated Blood Loss  .* No values recorded between 2/28/2023  8:19 AM and 2/28/2023  8:36 AM *     * No specimens in log *

## 2023-02-28 NOTE — ANESTHESIA CARE TRANSFER NOTE
"  Patient: Khushi Sawant    Procedure: Procedure(s):  CYSTOURETEROSCOPY, RETROGRADE PYELOGRAM, AND URETERAL STENT INSERTION       Diagnosis: Calculus of kidney [N20.0]  Diagnosis Additional Information: No value filed.    Anesthesia Type:   General     Note:/70   Pulse 82   Temp 36.1  C (97  F) (Temporal)   Resp 16   Ht 1.689 m (5' 6.5\")   Wt 75.3 kg (166 lb)   SpO2 96%   BMI 26.39 kg/m          Oropharynx: oropharynx clear of all foreign objects  Level of Consciousness: awake  Oxygen Supplementation: face mask  Level of Supplemental Oxygen (L/min / FiO2): 8  Independent Airway: airway patency satisfactory and stable  Dentition: dentition unchanged  Vital Signs Stable: post-procedure vital signs reviewed and stable  Report to RN Given: handoff report given  Patient transferred to: PACU    Handoff Report: Identifed the Patient, Identified the Reponsible Provider, Reviewed the pertinent medical history, Discussed the surgical course, Reviewed Intra-OP anesthesia mangement and issues during anesthesia, Set expectations for post-procedure period and Allowed opportunity for questions and acknowledgement of understanding      Vitals:  Vitals Value Taken Time   BP     Temp     Pulse     Resp     SpO2         Electronically Signed By: Valery Greene CRNA, APRN CRNA  February 28, 2023  8:45 AM  "

## 2023-02-28 NOTE — TELEPHONE ENCOUNTER
[facility] Call Center    Phone Message    May a detailed message be left on voicemail: yes     Reason for Call: Other: pt calling and has medication questions, please advise with her     Action Taken: Other: prudencio    Travel Screening: Not Applicable

## 2023-03-03 ENCOUNTER — TRANSFERRED RECORDS (OUTPATIENT)
Dept: HEALTH INFORMATION MANAGEMENT | Facility: CLINIC | Age: 58
End: 2023-03-03
Payer: COMMERCIAL

## 2023-03-03 NOTE — LETTER
[facility]  [address] [address]  [phone]                    March 13, 2023    Khushi Sawant  [address] [id number]      Dear Khushi,    Here is a summary of your recent test results:    Mammogram was normal.    WE ADVISE: rechecking in 1 year    Your test results are enclosed.      Please contact me if you have any questions.             Thank you very much for trusting [facility].     Healthy regards,      Camila Pederson, FNP-BC       No results found for any visits on 03/03/23.

## 2023-03-06 ENCOUNTER — TELEPHONE (OUTPATIENT)
Dept: UROLOGY | Facility: CLINIC | Age: 58
End: 2023-03-06
Payer: COMMERCIAL

## 2023-03-06 DIAGNOSIS — N20.0 CALCULUS OF KIDNEY: Primary | ICD-10-CM

## 2023-03-06 NOTE — TELEPHONE ENCOUNTER
Spoke with patient who was able to remove her stent without issue.  Also set her up with a ct and post op vv.  Valery Cam RN

## 2023-03-10 NOTE — RESULT ENCOUNTER NOTE
Please send letter.     Dear Valery,    Here is a summary of your recent test results:    Mammogram was normal.  ADVISE: rechecking in 1 year.      Please call us at [phone] (or use Yoovi) to address the above recommendations or have any questions.    Thank you for choosing [facility].  It was an honor and a privilege to participate in your care today.     Healthy regards,      THIERRY Servin

## 2023-03-27 ENCOUNTER — HOSPITAL ENCOUNTER (OUTPATIENT)
Dept: CT IMAGING | Facility: CLINIC | Age: 58
Discharge: HOME | End: 2023-03-27
Attending: UROLOGY | Admitting: UROLOGY
Payer: COMMERCIAL

## 2023-03-27 DIAGNOSIS — N20.0 CALCULUS OF KIDNEY: ICD-10-CM

## 2023-03-27 PROCEDURE — 74176 CT ABD & PELVIS W/O CONTRAST: CPT

## 2023-03-28 ENCOUNTER — VIRTUAL VISIT (OUTPATIENT)
Dept: UROLOGY | Facility: CLINIC | Age: 58
End: 2023-03-28
Payer: COMMERCIAL

## 2023-03-28 DIAGNOSIS — N20.0 CALCULUS OF KIDNEY: Primary | ICD-10-CM

## 2023-03-28 PROCEDURE — 99213 OFFICE O/P EST LOW 20 MIN: CPT | Mod: VID | Performed by: UROLOGY

## 2023-03-28 NOTE — PROGRESS NOTES
Assessment/Plan:    Assessment & Plan   Khushi was seen today for post op .    Diagnoses and all orders for this visit:    Calculus of kidney  -     Patient Stated Goal: Prevent further stones  -     CT Abdomen Pelvis w/o Contrast; Future        Stone Management Plan  Stone Management 12/29/2022 2/16/2023 3/28/2023   Urinary Tract Infection No suspicion of infection No suspicion of infection No suspicion of infection   Renal Colic Asymptomatic at this time Asymptomatic at this time Asymptomatic at this time   Renal Failure No suspicion of renal failure No suspicion of renal failure No suspicion of renal failure   Current CT date - 11/9/2022 3/27/2023   Right sided stones? - No No   R Number of ureteral stones - - -   R GSD of ureteral stones - - -   R Location of ureteral stone - - -   R Number of kidney stones  - - -   R GSD of kidney stones - - -   R Hydronephrosis - - -   R Stone Event Resolved event No current event No current event   Diagnosis date - - -   Initial location of primary symptomatic stone - - -   Initial GSD of primary symptomatic stone - - -   Resolved date 12/29/2022 - -   R Post-op status No imaging - -   R MET status - - -   R Current Plan - - -   MET - - -   Clear rationale - - -   Left sided stones? - Yes Yes   L Number of ureteral stones - No ureteral stones No ureteral stones   L Number of kidney stones  - 2 2   L GSD of kidney stones - 4 - 10 4 - 10   L Hydronephrosis - None None   L Stone Event No current event New event Resolved event   Diagnosis date - 11/9/2022 -   Initial location of primary symptomatic stone - Renal -   Initial GSD of primary symptomatic stone - 5 -   Post-op status - - >4 mm   L Current Plan - Clear -   Clear rationale - Patient preference -   Observe rationale - - -             PLAN    Video call duration: 7 minutes  Patient location in [place]: Home  Distant site (provider site): Clinic  12 minutes spent by me on the date of the encounter doing chart review,  history and exam, documentation and further activities per the note    MARIA DEL CARMEN PAZ MD  [facility]    HPI  Ms. Khushi Sawant is a 57 year old  female who is being evaluated via a billable video visit by [facility] for late postoperative follow-up.     She returns status post Left ureteroscopy for what was disovered to be inaccessible renal stone. She has had no unanticipated post-operative events.     She is asymptomatic at present. She denies symptoms of fever, chills, flank pain, nausea, vomiting, urinary frequency and dysuria.    New CT scan was personally reviewed and demonstrates persistent 5 mm left renal stone with no hydronephrosis.     She has previously undergone stone risk evaluation and will resume existing risk reduction strategy.    Will check follow up CT in one year.    ROS   Review of systems is negative except for HPI.    No past medical history on file.    Past Surgical History:   Procedure Laterality Date      SECTION       COLONOSCOPY N/A 2015    Procedure: COLONOSCOPY w/ placement of hemoclip x2;  Surgeon: Nena Anaya MD;  Location: [facility];  Service:      COMBINED CYSTOSCOPY, INSERT STENT URETER(S) Right 2022    Procedure: CYSTOURETEROSCOPY, WITH LASER LITHOTRIPSY, CALCULUS REMOVAL AND URETERAL STENT INSERTION;  Surgeon: Maria Del Carmen Paz MD;  Location: [facility] OR     COMBINED CYSTOSCOPY, INSERT STENT URETER(S) Left 2023    Procedure: CYSTOURETEROSCOPY, RETROGRADE PYELOGRAM, AND URETERAL STENT INSERTION;  Surgeon: Maria Del Carmen Paz MD;  Location: [facility] OR     HYSTERECTOMY      for menorrhagia       Current Outpatient Medications   Medication Sig Dispense Refill     estradiol (ESTRACE) 0.1 MG/GM vaginal cream 1 gram vaginally at bedtime daily for two weeks and then change to twice weekly. 42.5 g 11     lactobacillus rhamnosus (GG) (CULTURELL) capsule  Take 1 capsule by mouth 2 times daily         Allergies   Allergen Reactions     Effexor [Venlafaxine] Anxiety       Social History     Socioeconomic History     Marital status:      Spouse name: Not on file     Number of children: Not on file     Years of education: Not on file     Highest education level: Not on file   Occupational History     Not on file   Tobacco Use     Smoking status: Never     Smokeless tobacco: Never   Vaping Use     Vaping Use: Never used   Substance and Sexual Activity     Alcohol use: Yes     Comment: couple glasses of wine a week     Drug use: No     Sexual activity: Yes     Partners: Male     Birth control/protection: Female Surgical   Other Topics Concern     Parent/sibling w/ CABG, MI or angioplasty before 65F 55M? No   Social History Narrative     Not on file     Social Determinants of Health     Financial Resource Strain: Not on file   Food Insecurity: Not on file   Transportation Needs: Not on file   Physical Activity: Not on file   Stress: Not on file   Social Connections: Not on file   Intimate Partner Violence: Not on file   Housing Stability: Not on file       Family History   Problem Relation Age of Onset     Family History Negative Mother      Family History Negative Father        Objective:     Appears AAO x 3  No vitals obtained due to virtual visit    Labs   Most Recent 3 CBC's:Recent Labs   Lab Test 11/30/22  0907 10/31/22  1647 01/12/17  1043   WBC 4.9 6.8 5.1   HGB 14.7 15.1 15.5   MCV 93 99 94    208 173     Most Recent 3 BMP's:Recent Labs   Lab Test 01/27/23  0936 12/06/22  0958 11/30/22  0907    140 139   POTASSIUM 4.3 4.1 4.2   CHLORIDE 105 103 103   CO2 24 26 23   BUN 16.2 10.5 17.6   CR 0.70 0.66 0.61   ANIONGAP 13 11 13   VIKTORIYA 9.5 10.0 9.7   * 102* 108*     Most Recent Urinalysis:Recent Labs   Lab Test 11/08/22  0919   COLOR Yellow   APPEARANCE Clear   URINEGLC Negative   URINEBILI Negative   URINEKETONE 15*   SG 1.010   UBLD Moderate*    URINEPH 6.0   PROTEIN Negative   UROBILINOGEN 0.2   NITRITE Negative   LEUKEST Trace*   RBCU 0-2   WBCU 5-10*     Acute Labs   Urine Culture    Culture   Date Value Ref Range Status   11/08/2022 No Growth  Final   10/31/2022 10,000-50,000 CFU/mL Mixture of urogenital albino  Final   10/18/2022 >100,000 CFU/mL Proteus mirabilis (A)  Final

## 2023-03-28 NOTE — PROGRESS NOTES
Patient is roomed via telephone for a telehealth visit.  Patient confirmed she is in the [facility] at the time of this appointment.  Patient understand that this visit is billable and agree to proceed with appointment.

## 2023-03-28 NOTE — PATIENT INSTRUCTIONS
Patient Stated Goal: Prevent further stones  Calcium Oxalate Stone Prevention Self Management    Drink more fluids:    Drinking more liquids is the best way you can help prevent future stones. Stones can form when substances in the urine are too concentrated. The more you drink, the more urine you will make. This means all substances in the urine will be less concentrated.    How much urine should I be producing?    The usual recommended daily urine production is about 2 to 3 quarts (0527-8179 ml). If you are producing more than 3 quarts of urine on a regular basis, it is possible to deplete important minerals stored in the body.    To measure the amount of urine you produce in a day, you can either:    Collect all urine in a container and measure at the end of the day     Use a measuring cup each time you urinate and add up the amounts at the end of the day     Observe    Color - Dark radha urine is concentrated. Light straw color or lighter is dilute and desirable     Odor - Concentrated urine tends to smell stronger. Dilute urine is nearly odorless    Ways to increase your fluid intake    Increasing the amount of fluid you drink is effective for all types of kidney stones. While water is commonly recommended, all fluids are effective for increasing the amount of urine your body produces.    Focus on starting a lifelong habit, rather than a short-term solution.     Keep liquids on hand that you like. Crystal Light is a low calorie appropriate choice.    Drink out of larger glasses. You'll tend to drink more with each serving.     Have an additional glass of fluid with each meal.     Keep a water or drink bottle at work and fill it regularly.     *If you are prone to fluid retention, consult your doctor before making changes to your fluid habits.    Low Oxalate Diet:    Avoid excess amounts or daily consumption of these foods:    All nuts and nut products including peanuts, almonds, pecans, peanut butter, almond  milk    Rhubarb    Chocolate    Soybeans and soy products     Spinach    Wheat Germ    Beets    Maintain a normal calcium diet:    Researches have found that people with low calcium intakes tend to have more stones. Foods with high calcium content are acceptable and include:    Dairy products (including milk, cheese and yogurt)    Meat and fish    Enriched cereals    Dark green vegetables    What about calcium supplements?     Many people take calcium supplements, either on their own or as prescribed by a doctor. Research has indicated that calcium supplements do not usually pose a risk for stone formation.  Calcium citrate is a better choice for a supplement.    Avoid excess salt:    Salt (sodium chloride) is found in abundance in many foods. High sodium levels in the urine can interfere with the kidney's handling of calcium.     Tips for reducing the salt in your diet:    Don't use salt at the table    Reduce the salt used in food preparation. Try 1/2 teaspoon when recipes call for 1 teaspoon.    Use herbs and spices for flavoring instead of salt.    Avoid salty foods.    Check the label before you buy or use a product. Note sodium and portion size information.    Try to consume less than 2,000 mg/day. (1 teaspoon = 2,000 mg)    Foods with high sodium content include:    Processed meat (including luncheon meats, sausage)     Crackers     Instant cereal     Processed cheese     Canned soups     Chips and snack foods     Soy sauce    [facility] [place] can respond to your questions or concerns 24 hours a day at [phone].

## 2023-05-16 ENCOUNTER — MYC MEDICAL ADVICE (OUTPATIENT)
Dept: UROLOGY | Facility: CLINIC | Age: 58
End: 2023-05-16
Payer: COMMERCIAL

## 2023-06-12 ENCOUNTER — VIRTUAL VISIT (OUTPATIENT)
Dept: UROLOGY | Facility: CLINIC | Age: 58
End: 2023-06-12
Payer: COMMERCIAL

## 2023-06-12 VITALS — WEIGHT: 170 LBS | BODY MASS INDEX: 27.03 KG/M2

## 2023-06-12 DIAGNOSIS — N20.0 CALCULUS OF KIDNEY: Primary | ICD-10-CM

## 2023-06-12 PROCEDURE — 99214 OFFICE O/P EST MOD 30 MIN: CPT | Mod: GT | Performed by: UROLOGY

## 2023-06-12 NOTE — PROGRESS NOTES
Patient is roomed via telephone for a telehealth visit.  Patient confirmed she is in the [facility] at the time of this appointment.  Patient understand that this visit is billable and agree to proceed with appointment.

## 2023-06-12 NOTE — PROGRESS NOTES
Virtual Visit Details    Type of service:  Video Visit     Originating Location (pt. Location): Home    Distant Location (provider location):  On-site  Platform used for Video Visit: Yudith    Assessment & Plan   ASSESSMENT and PLAN  57 year old female here for reevaluation of left flank pain and a stone within a left calyceal diverticulum.    1) Left flank pain  2) left renal stone  3) left calyceal diverticulum    We discussed observation, shock wave lithotripsy, ureteroscopy and percutaneous nephrolithotomy as the main surgical approaches to upper urinary tract stones.  We reviewed risks and benefits including but not limited to the following: bleeding, infection, damage to adjacent organs, ureteral stricture, need for staged treatment, incomplete stone removal.    Ultimately the patient has elected to proceed with left ureteroscopy and possible left PCNL if stone not accessible.    We discussed the benefits and risks of ureteroscopy and percutaneous nephrolithotomy, including but not limited to, bleeding requiring blood transfusion, infection, need for nephrostomy tube or ureteral stent, ureteral stent related symptoms and cystoscopic removal in the office after surgery, injury to ureter, injury to the kidney rarely leading to long-term kidney damage, injury to the lungs/colon/intraabdomnal organs, need for second procedure if unable to reach stone or residual fragments.  Aware that he would be spending the night in the hospital after surgery and get a CT scan on postoperative day 0 or 1.    Aware there is a small chance of not being able to treat the stone, which she is aware of    Plan:  -120 min left ureteroscopy with possible left PCNL in supine position    Time spent:  minutes spent on the date of the encounter doing chart review, history and exam, documentation and further activities as noted above.    Hernan Jones MD   Urology  [facility]         Subjective     CHIEF COMPLAINT    follow-up of left renal stone and flank pain.      TERE   Khushi Sawant is a very pleasant 57 year old female who presents with a history of left renal stone and flank pain.  Patient has followed with Dr. Paz in the past and is s/p left ureteroscopy on 2/28/2023.  No stone was removed, because it was behind a stenosed infundibulum.  She last saw Dr Paz on 3/28/2023.  Underwent ureteroscopy on 2/28/2023 by Dr. Paz and was found to have a stone behind a stenosed infundibulum.    Still having left flank pain that is intermittent.    Imaging:  CT from 3/27/2023: no right stones.  Left 5 mm stone within parenchyma in a likely calyceal diverticulum.  3 mm mid-upper pole stone       Objective     PHYSICAL EXAM  Patient is a 57 year old  female   Vitals: Weight 77.1 kg (170 lb), not currently breastfeeding.  Body mass index is 27.03 kg/m .  General: No acute distress

## 2023-06-20 ENCOUNTER — TELEPHONE (OUTPATIENT)
Dept: UROLOGY | Facility: CLINIC | Age: 58
End: 2023-06-20
Payer: COMMERCIAL

## 2023-06-26 ENCOUNTER — TELEPHONE (OUTPATIENT)
Dept: UROLOGY | Facility: CLINIC | Age: 58
End: 2023-06-26
Payer: COMMERCIAL

## 2023-06-26 NOTE — TELEPHONE ENCOUNTER
Spoke with:. Patient       Date of surgery: Wednesday Sept 20 2023      Location: [place]      Informed patient they will need a adult : yes      Pre op with provider: Patient will set up at  [facility] a couple weeks prior to surgery       H&P Scheduled in PAC- NA        Pre procedure covid :Not required       Additional imaging: NA         Surgery Packet : Sent via Mavin       Additional comments:Please call patient with surgery teaching Also patient would like to do her KUB and Urine at her PCP Can you let her know when she needs to do that by.

## 2023-07-07 ENCOUNTER — NURSE TRIAGE (OUTPATIENT)
Dept: FAMILY MEDICINE | Facility: CLINIC | Age: 58
End: 2023-07-07

## 2023-07-07 ENCOUNTER — OFFICE VISIT (OUTPATIENT)
Dept: FAMILY MEDICINE | Facility: CLINIC | Age: 58
End: 2023-07-07
Payer: COMMERCIAL

## 2023-07-07 ENCOUNTER — ANCILLARY PROCEDURE (OUTPATIENT)
Dept: GENERAL RADIOLOGY | Facility: CLINIC | Age: 58
End: 2023-07-07
Attending: NURSE PRACTITIONER
Payer: COMMERCIAL

## 2023-07-07 VITALS
TEMPERATURE: 97.9 F | HEIGHT: 67 IN | HEART RATE: 96 BPM | RESPIRATION RATE: 16 BRPM | OXYGEN SATURATION: 100 % | WEIGHT: 175.4 LBS | SYSTOLIC BLOOD PRESSURE: 130 MMHG | BODY MASS INDEX: 27.53 KG/M2 | DIASTOLIC BLOOD PRESSURE: 80 MMHG

## 2023-07-07 DIAGNOSIS — M54.9 BACK PAIN, UNSPECIFIED BACK LOCATION, UNSPECIFIED BACK PAIN LATERALITY, UNSPECIFIED CHRONICITY: ICD-10-CM

## 2023-07-07 DIAGNOSIS — Z87.442 HISTORY OF KIDNEY STONES: ICD-10-CM

## 2023-07-07 DIAGNOSIS — M54.9 BACK PAIN, UNSPECIFIED BACK LOCATION, UNSPECIFIED BACK PAIN LATERALITY, UNSPECIFIED CHRONICITY: Primary | ICD-10-CM

## 2023-07-07 DIAGNOSIS — S39.92XA INJURY OF BACK, INITIAL ENCOUNTER: ICD-10-CM

## 2023-07-07 LAB
ALBUMIN UR-MCNC: NEGATIVE MG/DL
APPEARANCE UR: CLEAR
BACTERIA #/AREA URNS HPF: ABNORMAL /HPF
BILIRUB UR QL STRIP: NEGATIVE
COLOR UR AUTO: YELLOW
GLUCOSE UR STRIP-MCNC: NEGATIVE MG/DL
HGB UR QL STRIP: ABNORMAL
KETONES UR STRIP-MCNC: NEGATIVE MG/DL
LEUKOCYTE ESTERASE UR QL STRIP: NEGATIVE
MUCOUS THREADS #/AREA URNS LPF: PRESENT /LPF
NITRATE UR QL: NEGATIVE
PH UR STRIP: 6 [PH] (ref 5–7)
RBC #/AREA URNS AUTO: ABNORMAL /HPF
SP GR UR STRIP: >=1.03 (ref 1–1.03)
UROBILINOGEN UR STRIP-ACNC: 0.2 E.U./DL
WBC #/AREA URNS AUTO: ABNORMAL /HPF

## 2023-07-07 PROCEDURE — 96372 THER/PROPH/DIAG INJ SC/IM: CPT | Performed by: NURSE PRACTITIONER

## 2023-07-07 PROCEDURE — 72100 X-RAY EXAM L-S SPINE 2/3 VWS: CPT | Mod: TC | Performed by: RADIOLOGY

## 2023-07-07 PROCEDURE — 99214 OFFICE O/P EST MOD 30 MIN: CPT | Mod: 25 | Performed by: NURSE PRACTITIONER

## 2023-07-07 PROCEDURE — 81001 URINALYSIS AUTO W/SCOPE: CPT | Performed by: NURSE PRACTITIONER

## 2023-07-07 RX ORDER — METHYLPREDNISOLONE SODIUM SUCCINATE 40 MG/ML
40 INJECTION, POWDER, LYOPHILIZED, FOR SOLUTION INTRAMUSCULAR; INTRAVENOUS ONCE
Status: DISCONTINUED | OUTPATIENT
Start: 2023-07-07 | End: 2023-07-07 | Stop reason: ALTCHOICE

## 2023-07-07 RX ORDER — CYCLOBENZAPRINE HCL 5 MG
5-10 TABLET ORAL 3 TIMES DAILY PRN
Qty: 20 TABLET | Refills: 0 | Status: SHIPPED | OUTPATIENT
Start: 2023-07-07 | End: 2023-07-14

## 2023-07-07 RX ORDER — PREDNISONE 20 MG/1
TABLET ORAL
Qty: 18 TABLET | Refills: 0 | Status: SHIPPED | OUTPATIENT
Start: 2023-07-07 | End: 2024-01-23

## 2023-07-07 RX ORDER — METHYLPREDNISOLONE SODIUM SUCCINATE 40 MG/ML
40 INJECTION, POWDER, LYOPHILIZED, FOR SOLUTION INTRAMUSCULAR; INTRAVENOUS ONCE
Status: COMPLETED | OUTPATIENT
Start: 2023-07-07 | End: 2023-07-07

## 2023-07-07 RX ORDER — ACETAMINOPHEN AND CODEINE PHOSPHATE 300; 30 MG/1; MG/1
1 TABLET ORAL EVERY 6 HOURS PRN
Qty: 10 TABLET | Refills: 0 | Status: SHIPPED | OUTPATIENT
Start: 2023-07-07 | End: 2024-01-23

## 2023-07-07 RX ADMIN — METHYLPREDNISOLONE SODIUM SUCCINATE 40 MG: 40 INJECTION, POWDER, LYOPHILIZED, FOR SOLUTION INTRAMUSCULAR; INTRAVENOUS at 14:11

## 2023-07-07 NOTE — PROGRESS NOTES
"  Assessment & Plan     Back pain, unspecified back location, unspecified back pain laterality, unspecified chronicity  Seems msk in natural based on exam.   Meds.   Awaiting lumbar xray read.  Meds steroid injection and pill form to start tomorrow. Tylenol # 3 and muscle relaxer.    Red flag symptoms discussed and if these occur present to the emergency room or call 911.   Khushi verbalizes understanding of plan of care and is in agreement.    - UA with Microscopic - lab collect  - Urine Microscopic Exam  - XR Lumbar Spine 2/3 Views  - methylPREDNISolone sodium succinate (solu-MEDROL) injection 40 mg  - predniSONE (DELTASONE) 20 MG tablet  Dispense: 18 tablet; Refill: 0  - acetaminophen-codeine (TYLENOL #3) 300-30 MG per tablet  Dispense: 10 tablet; Refill: 0    History of kidney stones  This is not felt to be a stone.     Injury of back, initial encounter    - XR Lumbar Spine 2/3 Views  - methylPREDNISolone sodium succinate (solu-MEDROL) injection 40 mg  - predniSONE (DELTASONE) 20 MG tablet  Dispense: 18 tablet; Refill: 0         BMI:   Estimated body mass index is 27.89 kg/m  as calculated from the following:    Height as of this encounter: 1.689 m (5' 6.5\").    Weight as of this encounter: 79.6 kg (175 lb 6.4 oz).       Return in about 1 week (around 7/14/2023) for if symptoms persist or worsening please be seen.      ROSE MARIE Vergara CNP  [facility] ANGELICA Rasmussen is a 57 year old, presenting for the following health issues:  Musculoskeletal Problem        7/7/2023     1:12 PM   Additional Questions   Roomed by Lynda LIU   Accompanied by Self     Musculoskeletal Problem    History of Present Illness       Back Pain:  She presents for follow up of back pain. Patient's back pain is a new problem.    Original cause of back pain: lifting  First noticed back pain: yesterday  Patient feels back pain: constantlyLocation of back pain:  Right lower back, left lower back, right buttock " and left buttock  Description of back pain: sharp and shooting  Back pain spreads: right buttocks and left buttocks    Since patient first noticed back pain, pain is: gradually worsening  Does back pain interfere with her job:  Yes  On a scale of 1-10 (10 being the worst), patient describes pain as:  9  What makes back pain worse: bending, certain positions, sitting and standing  Acupuncture: not tried  Acetaminophen: helpful  Activity or exercise: not tried  Chiropractor:  Not tried  Cold: helpful  Heat: not tried  Massage: helpful  Muscle relaxants: not tried  NSAIDS: helpful  Opioids: not tried  Physical Therapy: not helpful  Rest: helpful  Steroid Injection: not tried  Stretching: not tried  Surgery: not tried  TENS unit: not tried  Topical pain relievers: not helpful  Other healthcare providers patient is seeing for back pain: None    She eats 2-3 servings of fruits and vegetables daily.She consumes 0 sweetened beverage(s) daily.She exercises with enough effort to increase her heart rate 20 to 29 minutes per day.  She exercises with enough effort to increase her heart rate 3 or less days per week.   She is taking medications regularly.       Triage note from 7/7/2023    Reason for Disposition   SEVERE back pain (e.g., excruciating, unable to do any normal activities) and not improved after pain medicine and CARE ADVICE     When sitting or standing, no pain laying flat    Additional Information   Negative: Passed out (i.e., fainted, collapsed and was not responding)   Negative: Shock suspected (e.g., cold/pale/clammy skin, too weak to stand, low BP, rapid pulse)   Negative: Sounds like a life-threatening emergency to the triager   Negative: Major injury to the back (e.g., MVA, fall > 10 feet or 3 meters, penetrating injury, etc.)   Negative: Pain in the upper back over the ribs (rib cage) that radiates (travels) into the chest   Negative: Pain in the upper back over the ribs (rib cage) and worsened by coughing  "(or clearly increases with breathing)   Negative: Back pain during pregnancy   Negative: SEVERE back pain of sudden onset and age > 60 years   Negative: SEVERE abdominal pain (e.g., excruciating)   Negative: Abdominal pain and age > 60 years   Negative: Unable to urinate (or only a few drops) and bladder feels very full   Negative: Loss of bladder or bowel control (urine or bowel incontinence; wetting self, leaking stool) of new-onset   Negative: Numbness (loss of sensation) in groin or rectal area   Negative: Pain radiates into groin, scrotum   Negative: Blood in urine (red, pink, or tea-colored)   Negative: Vomiting and pain over lower ribs of back (i.e., flank - kidney area)   Negative: Weakness of a leg or foot (e.g., unable to bear weight, dragging foot)   Negative: Patient sounds very sick or weak to the triager   Negative: Fever > 100.4 F (38.0 C) and flank pain   Negative: Pain or burning with passing urine (urination)    Protocols used: BACK PAIN-A-OH    Review of Systems   Constitutional, HEENT, cardiovascular, pulmonary, GI, , musculoskeletal, neuro, skin, endocrine and psych systems are negative, except as otherwise noted in the HPI.       Objective    /80   Pulse 96   Temp 97.9  F (36.6  C) (Tympanic)   Resp 16   Ht 1.689 m (5' 6.5\")   Wt 79.6 kg (175 lb 6.4 oz)   SpO2 100%   BMI 27.89 kg/m    Body mass index is 27.89 kg/m .  Physical Exam   GENERAL: healthy, alert and no distress  RESP: lungs clear to auscultation - no rales, rhonchi or wheezes  CV: regular rate and rhythm, normal S1 S2, no S3 or S4, no murmur, click or rub, no peripheral edema and peripheral pulses strong  ABDOMEN: soft, nontender, no hepatosplenomegaly, no masses and bowel sounds normal  MS: no gross musculoskeletal defects noted, no edema  BACK: no CVA tenderness, no paralumbar tenderness  Comprehensive back pain exam:  limited ROM flexion, extension and lateral rotation due to pain. Neg straight leg raises. "     Results for orders placed or performed in visit on 07/07/23   XR Lumbar Spine 2/3 Views     Status: None    Narrative    LUMBAR SPINE TWO TO THREE VIEWS  7/7/2023 2:11 PM     HISTORY: Back pain, unspecified back location, unspecified back pain  laterality, unspecified chronicity. Injury of back, initial encounter.    COMPARISON: CT of the abdomen and pelvis 3/27/2023.       Impression    IMPRESSION: Nomenclature is based on five lumbar vertebral bodies.  Minimal rightward curvature of the lower thoracic spine/thoracolumbar  junction. Alignment otherwise appears within normal limits. No gross  vertebral body height loss. There again appears to be a bridging  anterior endplate osteophyte at the T10-T11 level with mild disc space  narrowing. The other intervertebral disc spaces appear maintained in  height. Tiny marginal anterior endplate osteophytes are seen in the  mid to lower lumbar spine. No advanced facet arthropathy is  identified.    ASIA PACE MD         SYSTEM ID:  [id number]   Results for orders placed or performed in visit on 07/07/23   UA with Microscopic - lab collect     Status: Abnormal   Result Value Ref Range    Color Urine Yellow Colorless, Straw, Light Yellow, Yellow    Appearance Urine Clear Clear    Glucose Urine Negative Negative mg/dL    Bilirubin Urine Negative Negative    Ketones Urine Negative Negative mg/dL    Specific Gravity Urine >=1.030 1.003 - 1.035    Blood Urine Trace (A) Negative    pH Urine 6.0 5.0 - 7.0    Protein Albumin Urine Negative Negative mg/dL    Urobilinogen Urine 0.2 0.2, 1.0 E.U./dL    Nitrite Urine Negative Negative    Leukocyte Esterase Urine Negative Negative   Urine Microscopic Exam     Status: Abnormal   Result Value Ref Range    Bacteria Urine Few (A) None Seen /HPF    RBC Urine 0-2 0-2 /HPF /HPF    WBC Urine 0-5 0-5 /HPF /HPF    Mucus Urine Present (A) None Seen /LPF

## 2023-07-07 NOTE — TELEPHONE ENCOUNTER
Situation    calls asking for prednisone for his wife due to  back pain.      Background   Has Kidney stone presently-no symptoms       Assessment.   She was picking up her grand baby from a crib and felt a sharp pain on both side of her tailbone in her lower back.     The patient had severe pain when trying to walk this morning,her  assisted her to the bathroom     Called patient to assess- LMTCB     Patient called back.   Can move side to side now.      Severe pain in the tailbone, just above crease of buttocks when she sits or stands up straight.     No pain laying flat   Denied radiating pain to either side of her lower back or down either legs   No leg weakness, denied knees buckling.   No numbness or tingling in right or left leg.     Denied bowel or bladder incontinence.     No recent falls     Recommendations   Advised of location, arrival and appointment time.   Future Appointments 7/7/2023 - 1/3/2024      Date Visit Type Length Department Provider     7/7/2023  1:30 PM OFFICE VISIT 30 min  [facility] Camila Pederson APRN CNP    Location Instructions:     [facility] is located at [address] [place] [address], along [address] Free parking is available; access the lot by turning north from [place] [address] onto [address], then west onto [address].              9/5/2023  8:00 AM MYC PRE-OPERATIVE PHYSICAL 30 min  F[facility]ILY [facility] Camila Pederson APRN CNP    Location Instructions:     [facility] is located at [address], [address] Free parking is available; access the lot by turning north from [place] [address] onto [address], then west onto [address].                   Tylenol an Advil as directed   Pillow support.   Ice 20 minutes 3-5 times a day ( off at least an hour)  Advised to call back if symptoms  worsen or change before appointment.     Shae DIEGO RN   [facility]   [facility]

## 2023-07-07 NOTE — TELEPHONE ENCOUNTER
Reason for Disposition    SEVERE back pain (e.g., excruciating, unable to do any normal activities) and not improved after pain medicine and CARE ADVICE     When sitting or standing, no pain laying flat    Additional Information    Negative: Passed out (i.e., fainted, collapsed and was not responding)    Negative: Shock suspected (e.g., cold/pale/clammy skin, too weak to stand, low BP, rapid pulse)    Negative: Sounds like a life-threatening emergency to the triager    Negative: Major injury to the back (e.g., MVA, fall > 10 feet or 3 meters, penetrating injury, etc.)    Negative: Pain in the upper back over the ribs (rib cage) that radiates (travels) into the chest    Negative: Pain in the upper back over the ribs (rib cage) and worsened by coughing (or clearly increases with breathing)    Negative: Back pain during pregnancy    Negative: SEVERE back pain of sudden onset and age > 60 years    Negative: SEVERE abdominal pain (e.g., excruciating)    Negative: Abdominal pain and age > 60 years    Negative: Unable to urinate (or only a few drops) and bladder feels very full    Negative: Loss of bladder or bowel control (urine or bowel incontinence; wetting self, leaking stool) of new-onset    Negative: Numbness (loss of sensation) in groin or rectal area    Negative: Pain radiates into groin, scrotum    Negative: Blood in urine (red, pink, or tea-colored)    Negative: Vomiting and pain over lower ribs of back (i.e., flank - kidney area)    Negative: Weakness of a leg or foot (e.g., unable to bear weight, dragging foot)    Negative: Patient sounds very sick or weak to the triager    Negative: Fever > 100.4 F (38.0 C) and flank pain    Negative: Pain or burning with passing urine (urination)    Protocols used: BACK PAIN-A-OH

## 2023-07-07 NOTE — RESULT ENCOUNTER NOTE
Dear Valery,    Here is a summary of your recent test results:    Your back has some chronic appearing mild disc space narrowing but nothing obviously acute.  IMPRESSION: Nomenclature is based on five lumbar vertebral bodies.  Minimal rightward curvature of the lower thoracic spine/thoracolumbar  junction. Alignment otherwise appears within normal limits. No gross  vertebral body height loss. There again appears to be a bridging  anterior endplate osteophyte at the T10-T11 level with mild disc space  narrowing. The other intervertebral disc spaces appear maintained in  height. Tiny marginal anterior endplate osteophytes are seen in the  mid to lower lumbar spine. No advanced facet arthropathy is  identified.      Proceed with planned medications and of course follow-up urgently or if symptoms linger I can refer you to a spine specialist.    I hope you fell better soon.     Thank you for choosing [facility].  It was an honor and a privilege to participate in your care.       Healthy regards,    Camila ePderson, CLAUDIOP  [facility]

## 2023-07-13 ENCOUNTER — MYC MEDICAL ADVICE (OUTPATIENT)
Dept: FAMILY MEDICINE | Facility: CLINIC | Age: 58
End: 2023-07-13
Payer: COMMERCIAL

## 2023-07-13 DIAGNOSIS — S39.92XA INJURY OF BACK, INITIAL ENCOUNTER: ICD-10-CM

## 2023-07-13 DIAGNOSIS — M54.9 BACK PAIN, UNSPECIFIED BACK LOCATION, UNSPECIFIED BACK PAIN LATERALITY, UNSPECIFIED CHRONICITY: ICD-10-CM

## 2023-07-14 RX ORDER — CYCLOBENZAPRINE HCL 5 MG
5-10 TABLET ORAL 3 TIMES DAILY PRN
Qty: 20 TABLET | Refills: 0 | Status: SHIPPED | OUTPATIENT
Start: 2023-07-14 | End: 2023-08-23

## 2023-07-14 NOTE — TELEPHONE ENCOUNTER
Patient is calling to request refill on cyclobenzaprine. Patient reports she is still having back spasm. Please advise.

## 2023-07-24 ENCOUNTER — TELEPHONE (OUTPATIENT)
Dept: UROLOGY | Facility: CLINIC | Age: 58
End: 2023-07-24
Payer: COMMERCIAL

## 2023-08-23 ENCOUNTER — OFFICE VISIT (OUTPATIENT)
Dept: FAMILY MEDICINE | Facility: CLINIC | Age: 58
End: 2023-08-23
Payer: COMMERCIAL

## 2023-08-23 VITALS
BODY MASS INDEX: 26.68 KG/M2 | HEART RATE: 82 BPM | HEIGHT: 67 IN | WEIGHT: 170 LBS | TEMPERATURE: 97.7 F | DIASTOLIC BLOOD PRESSURE: 84 MMHG | OXYGEN SATURATION: 98 % | SYSTOLIC BLOOD PRESSURE: 126 MMHG | RESPIRATION RATE: 16 BRPM

## 2023-08-23 DIAGNOSIS — M25.511 ACUTE PAIN OF RIGHT SHOULDER: Primary | ICD-10-CM

## 2023-08-23 PROCEDURE — 99213 OFFICE O/P EST LOW 20 MIN: CPT | Mod: 25 | Performed by: NURSE PRACTITIONER

## 2023-08-23 PROCEDURE — 96372 THER/PROPH/DIAG INJ SC/IM: CPT | Performed by: NURSE PRACTITIONER

## 2023-08-23 RX ORDER — CYCLOBENZAPRINE HCL 5 MG
5-10 TABLET ORAL
Qty: 15 TABLET | Refills: 0 | Status: SHIPPED | OUTPATIENT
Start: 2023-08-23 | End: 2023-09-05

## 2023-08-23 RX ORDER — NAPROXEN 500 MG/1
500 TABLET ORAL 2 TIMES DAILY WITH MEALS
Qty: 45 TABLET | Refills: 0 | Status: SHIPPED | OUTPATIENT
Start: 2023-08-23 | End: 2023-09-05

## 2023-08-23 RX ORDER — KETOROLAC TROMETHAMINE 30 MG/ML
30 INJECTION, SOLUTION INTRAMUSCULAR; INTRAVENOUS ONCE
Status: COMPLETED | OUTPATIENT
Start: 2023-08-23 | End: 2023-08-23

## 2023-08-23 RX ORDER — METHYLPREDNISOLONE 4 MG
TABLET, DOSE PACK ORAL
Qty: 21 TABLET | Refills: 0 | Status: SHIPPED | OUTPATIENT
Start: 2023-08-23 | End: 2023-09-05

## 2023-08-23 RX ADMIN — KETOROLAC TROMETHAMINE 30 MG: 30 INJECTION, SOLUTION INTRAMUSCULAR; INTRAVENOUS at 09:50

## 2023-08-23 NOTE — PROGRESS NOTES
"  Assessment & Plan     Khushi was seen today for shoulder pain.    Diagnoses and all orders for this visit:    Acute pain of right shoulder  Will treat as acute musculoskeletal abnormality, discussed imaging (patient declined at this time).   With no improvement or worsening suggest further evaluation of cervical region and possible cervical/right shoulder imaging.   Schedule NSAID (Naproxen 500 mg twice daily for 5-7 days and then may use as needed), Flexeril at bedtime and Medrol dosepak.     -     ketorolac (TORADOL) injection 30 mg  -     methylPREDNISolone (MEDROL DOSEPAK) 4 MG tablet therapy pack; Follow Package Directions  -     naproxen (NAPROSYN) 500 MG tablet; Take 1 tablet (500 mg) by mouth 2 times daily (with meals)  -     cyclobenzaprine (FLEXERIL) 5 MG tablet; Take 1-2 tablets (5-10 mg) by mouth nightly as needed for muscle spasms        BMI:   Estimated body mass index is 27.03 kg/m  as calculated from the following:    Height as of this encounter: 1.689 m (5' 6.5\").    Weight as of this encounter: 77.1 kg (170 lb).     Return in about 1 week (around 8/30/2023) for No improvement or sooner with worsening symptoms.      Sandhya Masters, ROSE MARIE CNP  [facility] is a 58 year old, presenting for the following health issues:  Shoulder Pain        8/23/2023     9:09 AM   Additional Questions   Roomed by Robyn DOMINGO CMA     Shoulder Pain    History of Present Illness     No injury or trauma.   Onset 3 days ago - right shoulder was sore and now has progressively gotten worse.   Slight improvement with Advil.    Starts are right shoulder and pain radiates down to right hand.    No numbness or tingling.    Aggravated with movement.   Has been applying ice without improvement.    Denies neck pain.     No previous shoulder injury or surgery.          Reason for visit:  Right shoulder pain  Symptom onset:  1-3 days ago  Symptoms include:  Sharp pain when raising arm " "up  Symptom intensity:  Moderate  Symptom progression:  Staying the same  Had these symptoms before:  No  What makes it worse:  Raising my arm  What makes it better:  Advil    She eats 2-3 servings of fruits and vegetables daily.She consumes 0 sweetened beverage(s) daily.She exercises with enough effort to increase her heart rate 20 to 29 minutes per day.  She exercises with enough effort to increase her heart rate 4 days per week.          Review of Systems   Constitutional, HEENT, cardiovascular, pulmonary, gi and gu systems are negative, except as otherwise noted.      Objective    /84   Pulse 82   Temp 97.7  F (36.5  C) (Tympanic)   Resp 16   Ht 1.689 m (5' 6.5\")   Wt 77.1 kg (170 lb)   SpO2 98%   BMI 27.03 kg/m    Body mass index is 27.03 kg/m .    Physical Exam     GENERAL: healthy, alert and no distress  RESP: lungs clear to auscultation - no rales, rhonchi or wheezes  CV: regular rate and rhythm, normal S1 S2  MS: right shoulder with tenderness to palpation along anterior joint line, no AC or trapezius tenderness to palpation, ROM limited to 90 degrees, strength of right UE +4.5/5 compared to left UE  PSYCH: mentation appears normal, affect normal/bright                  " Yes

## 2023-08-31 ENCOUNTER — PATIENT OUTREACH (OUTPATIENT)
Dept: UROLOGY | Facility: CLINIC | Age: 58
End: 2023-08-31
Payer: COMMERCIAL

## 2023-08-31 NOTE — PROGRESS NOTES
Procedure: PCNL/Lithotripsy    Date: 09/13/2023  Provider: Robert     Post op appt: willyd    H&P: yes will have done on 09/06 with [facility] primary  UA/UC: will have done on 09/06 with [facility] primary    Medications: yes  Soap: yes  Reviewed when to start clear liquids and when to start NPO: yes  : yes  24 hour observation: yes/possible overnight    Pt or family member expressed understanding: yes    Danette Maxwell RN  8/31/2023  12:50 PM

## 2023-09-05 ENCOUNTER — OFFICE VISIT (OUTPATIENT)
Dept: FAMILY MEDICINE | Facility: CLINIC | Age: 58
End: 2023-09-05
Payer: COMMERCIAL

## 2023-09-05 ENCOUNTER — MYC MEDICAL ADVICE (OUTPATIENT)
Dept: FAMILY MEDICINE | Facility: CLINIC | Age: 58
End: 2023-09-05

## 2023-09-05 ENCOUNTER — OFFICE VISIT (OUTPATIENT)
Dept: PEDIATRICS | Facility: CLINIC | Age: 58
End: 2023-09-05
Payer: COMMERCIAL

## 2023-09-05 ENCOUNTER — ANCILLARY PROCEDURE (OUTPATIENT)
Dept: GENERAL RADIOLOGY | Facility: CLINIC | Age: 58
End: 2023-09-05
Attending: UROLOGY
Payer: COMMERCIAL

## 2023-09-05 ENCOUNTER — HOSPITAL ENCOUNTER (OUTPATIENT)
Dept: CT IMAGING | Facility: CLINIC | Age: 58
Discharge: HOME | End: 2023-09-05
Attending: PREVENTIVE MEDICINE | Admitting: PREVENTIVE MEDICINE
Payer: COMMERCIAL

## 2023-09-05 VITALS
WEIGHT: 168 LBS | RESPIRATION RATE: 16 BRPM | BODY MASS INDEX: 26.37 KG/M2 | DIASTOLIC BLOOD PRESSURE: 82 MMHG | HEIGHT: 67 IN | TEMPERATURE: 98 F | SYSTOLIC BLOOD PRESSURE: 157 MMHG | HEART RATE: 96 BPM | OXYGEN SATURATION: 99 %

## 2023-09-05 VITALS
SYSTOLIC BLOOD PRESSURE: 122 MMHG | OXYGEN SATURATION: 98 % | HEIGHT: 67 IN | HEART RATE: 92 BPM | TEMPERATURE: 98 F | WEIGHT: 168 LBS | DIASTOLIC BLOOD PRESSURE: 70 MMHG | BODY MASS INDEX: 26.37 KG/M2 | RESPIRATION RATE: 12 BRPM

## 2023-09-05 DIAGNOSIS — Z01.818 PREOP GENERAL PHYSICAL EXAM: Primary | ICD-10-CM

## 2023-09-05 DIAGNOSIS — N20.0 CALCULUS OF KIDNEY: ICD-10-CM

## 2023-09-05 DIAGNOSIS — R93.5 ABNORMAL X-RAY OF ABDOMEN: ICD-10-CM

## 2023-09-05 DIAGNOSIS — R93.5 ABNORMAL X-RAY OF ABDOMEN: Primary | ICD-10-CM

## 2023-09-05 LAB
ALBUMIN SERPL BCG-MCNC: 4.7 G/DL (ref 3.5–5.2)
ALP SERPL-CCNC: 57 U/L (ref 35–104)
ALT SERPL W P-5'-P-CCNC: 24 U/L (ref 0–50)
ANION GAP SERPL CALCULATED.3IONS-SCNC: 11 MMOL/L (ref 7–15)
AST SERPL W P-5'-P-CCNC: 29 U/L (ref 0–45)
BASOPHILS # BLD AUTO: 0 10E3/UL (ref 0–0.2)
BASOPHILS NFR BLD AUTO: 0 %
BILIRUB SERPL-MCNC: 0.4 MG/DL
BUN SERPL-MCNC: 12.6 MG/DL (ref 6–20)
CALCIUM SERPL-MCNC: 10 MG/DL (ref 8.6–10)
CHLORIDE SERPL-SCNC: 104 MMOL/L (ref 98–107)
CREAT SERPL-MCNC: 0.72 MG/DL (ref 0.51–0.95)
DEPRECATED HCO3 PLAS-SCNC: 25 MMOL/L (ref 22–29)
EOSINOPHIL # BLD AUTO: 0.1 10E3/UL (ref 0–0.7)
EOSINOPHIL NFR BLD AUTO: 1 %
ERYTHROCYTE [DISTWIDTH] IN BLOOD BY AUTOMATED COUNT: 12.3 % (ref 10–15)
GFR SERPL CREATININE-BSD FRML MDRD: >90 ML/MIN/1.73M2
GLUCOSE SERPL-MCNC: 144 MG/DL (ref 70–99)
HCT VFR BLD AUTO: 42.2 % (ref 35–47)
HGB BLD-MCNC: 14.2 G/DL (ref 11.7–15.7)
IMM GRANULOCYTES # BLD: 0 10E3/UL
IMM GRANULOCYTES NFR BLD: 0 %
LYMPHOCYTES # BLD AUTO: 1.7 10E3/UL (ref 0.8–5.3)
LYMPHOCYTES NFR BLD AUTO: 23 %
MCH RBC QN AUTO: 31.9 PG (ref 26.5–33)
MCHC RBC AUTO-ENTMCNC: 33.6 G/DL (ref 31.5–36.5)
MCV RBC AUTO: 95 FL (ref 78–100)
MONOCYTES # BLD AUTO: 0.4 10E3/UL (ref 0–1.3)
MONOCYTES NFR BLD AUTO: 5 %
NEUTROPHILS # BLD AUTO: 5.2 10E3/UL (ref 1.6–8.3)
NEUTROPHILS NFR BLD AUTO: 71 %
NRBC # BLD AUTO: 0 10E3/UL
NRBC BLD AUTO-RTO: 0 /100
PLATELET # BLD AUTO: 170 10E3/UL (ref 150–450)
POTASSIUM SERPL-SCNC: 4.1 MMOL/L (ref 3.4–5.3)
PROT SERPL-MCNC: 7 G/DL (ref 6.4–8.3)
RBC # BLD AUTO: 4.45 10E6/UL (ref 3.8–5.2)
SODIUM SERPL-SCNC: 140 MMOL/L (ref 136–145)
WBC # BLD AUTO: 7.5 10E3/UL (ref 4–11)

## 2023-09-05 PROCEDURE — 36415 COLL VENOUS BLD VENIPUNCTURE: CPT | Performed by: PREVENTIVE MEDICINE

## 2023-09-05 PROCEDURE — 85025 COMPLETE CBC W/AUTO DIFF WBC: CPT | Performed by: PREVENTIVE MEDICINE

## 2023-09-05 PROCEDURE — 250N000009 HC RX 250: Performed by: PREVENTIVE MEDICINE

## 2023-09-05 PROCEDURE — 99417 PROLNG OP E/M EACH 15 MIN: CPT | Performed by: PREVENTIVE MEDICINE

## 2023-09-05 PROCEDURE — 99207 REFERRAL TO ACUTE AND DIAGNOSTIC SERVICES: CPT | Performed by: NURSE PRACTITIONER

## 2023-09-05 PROCEDURE — 250N000011 HC RX IP 250 OP 636: Performed by: PREVENTIVE MEDICINE

## 2023-09-05 PROCEDURE — 99215 OFFICE O/P EST HI 40 MIN: CPT | Performed by: PREVENTIVE MEDICINE

## 2023-09-05 PROCEDURE — 74177 CT ABD & PELVIS W/CONTRAST: CPT

## 2023-09-05 PROCEDURE — 87186 SC STD MICRODIL/AGAR DIL: CPT | Performed by: NURSE PRACTITIONER

## 2023-09-05 PROCEDURE — 87088 URINE BACTERIA CULTURE: CPT | Performed by: NURSE PRACTITIONER

## 2023-09-05 PROCEDURE — 80053 COMPREHEN METABOLIC PANEL: CPT | Performed by: PREVENTIVE MEDICINE

## 2023-09-05 PROCEDURE — 87086 URINE CULTURE/COLONY COUNT: CPT | Performed by: NURSE PRACTITIONER

## 2023-09-05 PROCEDURE — 74018 RADEX ABDOMEN 1 VIEW: CPT | Mod: TC | Performed by: RADIOLOGY

## 2023-09-05 RX ORDER — IOPAMIDOL 755 MG/ML
500 INJECTION, SOLUTION INTRAVASCULAR ONCE
Status: COMPLETED | OUTPATIENT
Start: 2023-09-05 | End: 2023-09-05

## 2023-09-05 RX ADMIN — SODIUM CHLORIDE 61 ML: 9 INJECTION, SOLUTION INTRAVENOUS at 13:39

## 2023-09-05 RX ADMIN — IOPAMIDOL 84 ML: 755 INJECTION, SOLUTION INTRAVENOUS at 13:39

## 2023-09-05 SDOH — ECONOMIC STABILITY: INCOME INSECURITY: IN THE LAST 12 MONTHS, WAS THERE A TIME WHEN YOU WERE NOT ABLE TO PAY THE MORTGAGE OR RENT ON TIME?: NO

## 2023-09-05 SDOH — ECONOMIC STABILITY: TRANSPORTATION INSECURITY
IN THE PAST 12 MONTHS, HAS THE LACK OF TRANSPORTATION KEPT YOU FROM MEDICAL APPOINTMENTS OR FROM GETTING MEDICATIONS?: NO

## 2023-09-05 SDOH — HEALTH STABILITY: PHYSICAL HEALTH: ON AVERAGE, HOW MANY DAYS PER WEEK DO YOU ENGAGE IN MODERATE TO STRENUOUS EXERCISE (LIKE A BRISK WALK)?: 4 DAYS

## 2023-09-05 SDOH — ECONOMIC STABILITY: FOOD INSECURITY: WITHIN THE PAST 12 MONTHS, THE FOOD YOU BOUGHT JUST DIDN'T LAST AND YOU DIDN'T HAVE MONEY TO GET MORE.: NEVER TRUE

## 2023-09-05 SDOH — ECONOMIC STABILITY: FOOD INSECURITY: WITHIN THE PAST 12 MONTHS, YOU WORRIED THAT YOUR FOOD WOULD RUN OUT BEFORE YOU GOT MONEY TO BUY MORE.: NEVER TRUE

## 2023-09-05 SDOH — ECONOMIC STABILITY: TRANSPORTATION INSECURITY
IN THE PAST 12 MONTHS, HAS LACK OF TRANSPORTATION KEPT YOU FROM MEETINGS, WORK, OR FROM GETTING THINGS NEEDED FOR DAILY LIVING?: NO

## 2023-09-05 SDOH — ECONOMIC STABILITY: INCOME INSECURITY: HOW HARD IS IT FOR YOU TO PAY FOR THE VERY BASICS LIKE FOOD, HOUSING, MEDICAL CARE, AND HEATING?: NOT HARD AT ALL

## 2023-09-05 SDOH — HEALTH STABILITY: PHYSICAL HEALTH: ON AVERAGE, HOW MANY MINUTES DO YOU ENGAGE IN EXERCISE AT THIS LEVEL?: 30 MIN

## 2023-09-05 ASSESSMENT — SOCIAL DETERMINANTS OF HEALTH (SDOH)
HOW OFTEN DO YOU ATTEND CHURCH OR RELIGIOUS SERVICES?: 1 TO 4 TIMES PER YEAR
IN A TYPICAL WEEK, HOW MANY TIMES DO YOU TALK ON THE PHONE WITH FAMILY, FRIENDS, OR NEIGHBORS?: THREE TIMES A WEEK
DO YOU BELONG TO ANY CLUBS OR ORGANIZATIONS SUCH AS CHURCH GROUPS UNIONS, FRATERNAL OR ATHLETIC GROUPS, OR SCHOOL GROUPS?: NO
HOW OFTEN DO YOU GET TOGETHER WITH FRIENDS OR RELATIVES?: TWICE A WEEK

## 2023-09-05 ASSESSMENT — PAIN SCALES - GENERAL: PAINLEVEL: NO PAIN (0)

## 2023-09-05 ASSESSMENT — LIFESTYLE VARIABLES
HOW MANY STANDARD DRINKS CONTAINING ALCOHOL DO YOU HAVE ON A TYPICAL DAY: 1 OR 2
AUDIT-C TOTAL SCORE: 2
HOW OFTEN DO YOU HAVE SIX OR MORE DRINKS ON ONE OCCASION: NEVER
HOW OFTEN DO YOU HAVE A DRINK CONTAINING ALCOHOL: 2-4 TIMES A MONTH
SKIP TO QUESTIONS 9-10: 1

## 2023-09-05 NOTE — TELEPHONE ENCOUNTER
"Please see MyChart regarding X ray result. Pt also sent additional MyChart and called asking question below. Please advise.    \"Should I be postponing my kidney surgery since we know what that is and doing something about this other thing? \"    Emi Soriano RN [place] Triage    "

## 2023-09-05 NOTE — TELEPHONE ENCOUNTER
Previous MyChart encounter regarding this issue. Closing encounter.    Emi Soriano RN [place] Triage]

## 2023-09-05 NOTE — PROGRESS NOTES
"  Assessment & Plan     (R93.5) Abnormal x-ray of abdomen  (primary encounter diagnosis)  Plan: CBC with platelets and differential,         Comprehensive metabolic panel, CT Abdomen         Pelvis w Contrast, sodium chloride (PF) 0.9% PF        flush 3 mL    Labs wnl  CT scan shows hyperdense intraluminal material in the cecum  Patient reassured.        99 minutes spent by me on the date of the encounter doing chart review, history and exam, documentation and further activities per the note       BMI:   Estimated body mass index is 26.31 kg/m  as calculated from the following:    Height as of this encounter: 1.702 m (5' 7\").    Weight as of this encounter: 76.2 kg (168 lb).       See Patient Instructions    No follow-ups on file.    Albert Soriano MD  [facility] LISETTE Rasmussen is a 58 year old, presenting for the following health issues:  Follow Up (Follow up abnormal xray)      HPI     This is a 57 yo female who presents with abnormal axr - ?18 mm calcification over right iliac bone.  No symptoms.  Referred for ct imaging given abnormal xray done earlier today in preparation for kidney stone procedure.    Review of Systems   Constitutional, HEENT, cardiovascular, pulmonary, GI, , musculoskeletal, neuro, skin, endocrine and psych systems are negative, except as otherwise noted.      Objective    BP (!) 157/82 (BP Location: Left arm, Patient Position: Sitting, Cuff Size: Adult Regular)   Pulse 96   Temp 98  F (36.7  C)   Resp 16   Ht 1.702 m (5' 7\")   Wt 76.2 kg (168 lb)   SpO2 99%   BMI 26.31 kg/m    Body mass index is 26.31 kg/m .  Physical Exam   GENERAL: healthy, alert and no distress  EYES: Eyes grossly normal to inspection, PERRL and conjunctivae and sclerae normal  HENT: ear canals and TM's normal, nose and mouth without ulcers or lesions  NECK: no adenopathy, no asymmetry, masses, or scars and thyroid normal to palpation  RESP: lungs clear to " auscultation - no rales, rhonchi or wheezes  CV: regular rate and rhythm, normal S1 S2, no S3 or S4, no murmur, click or rub, no peripheral edema and peripheral pulses strong  ABDOMEN: soft, nontender, no hepatosplenomegaly, no masses and bowel sounds normal  MS: no gross musculoskeletal defects noted, no edema  SKIN: no suspicious lesions or rashes  NEURO: Normal strength and tone, mentation intact and speech normal  PSYCH: mentation appears normal, affect normal/bright    Results for orders placed or performed during the hospital encounter of 09/05/23   CT Abdomen Pelvis w Contrast     Status: None    Narrative    CT ABDOMEN PELVIS WITH CONTRAST 9/5/2023 1:49 PM    CLINICAL HISTORY: Evaluate left renal stone and right lower quadrant  calcification noted on abdominal radiograph from earlier today.    TECHNIQUE: CT scan of the abdomen and pelvis was performed following  injection of IV contrast. Multiplanar reformats were obtained. Dose  reduction techniques were used.  CONTRAST: 84mL Isovue-370    COMPARISON: Abdominal radiograph performed earlier today. CT of the  abdomen and pelvis performed 3/27/2023.    FINDINGS:   LOWER CHEST: The visualized lung bases are clear.    HEPATOBILIARY: Hepatic steatosis. No hepatic masses. No calcified  gallstones.    PANCREAS: Normal.    SPLEEN: Normal.    ADRENAL GLANDS: Normal.    KIDNEYS/BLADDER: There are two nonobstructing stones in the left  kidney, with the largest in the interpolar region measuring 0.5 cm,  unchanged. The kidneys are otherwise unremarkable. No hydronephrosis.    BOWEL: No bowel obstruction. No convincing evidence for colitis or  diverticulitis. Unremarkable appendix.    PELVIC ORGANS: Supracervical hysterectomy.    LYMPH NODES: No enlarged lymph nodes are identified in the abdomen or  pelvis.    VASCULATURE: Retroaortic left renal vein.    ADDITIONAL FINDINGS: Right lower quadrant calcification noted on the  prior abdominal radiograph is of uncertain  etiology, but may  correspond to bowel contents within the cecum.    MUSCULOSKELETAL: Unremarkable.      Impression    IMPRESSION:   1.  Two nonobstructing stones in the left kidney are unchanged, with  the largest measuring 0.5 cm.  2.  Hepatic steatosis.  3.  Previously described right lower quadrant calcification is of  uncertain etiology, but may correspond to hyperdense bowel contents.     BIRGIT CHATMAN MD         SYSTEM ID:  [id number]   Results for orders placed or performed in visit on 09/05/23   Comprehensive metabolic panel     Status: Abnormal   Result Value Ref Range    Sodium 140 136 - 145 mmol/L    Potassium 4.1 3.4 - 5.3 mmol/L    Chloride 104 98 - 107 mmol/L    Carbon Dioxide (CO2) 25 22 - 29 mmol/L    Anion Gap 11 7 - 15 mmol/L    Urea Nitrogen 12.6 6.0 - 20.0 mg/dL    Creatinine 0.72 0.51 - 0.95 mg/dL    Calcium 10.0 8.6 - 10.0 mg/dL    Glucose 144 (H) 70 - 99 mg/dL    Alkaline Phosphatase 57 35 - 104 U/L    AST 29 0 - 45 U/L    ALT 24 0 - 50 U/L    Protein Total 7.0 6.4 - 8.3 g/dL    Albumin 4.7 3.5 - 5.2 g/dL    Bilirubin Total 0.4 <=1.2 mg/dL    GFR Estimate >90 >60 mL/min/1.73m2   CBC with platelets and differential     Status: None   Result Value Ref Range    WBC Count 7.5 4.0 - 11.0 10e3/uL    RBC Count 4.45 3.80 - 5.20 10e6/uL    Hemoglobin 14.2 11.7 - 15.7 g/dL    Hematocrit 42.2 35.0 - 47.0 %    MCV 95 78 - 100 fL    MCH 31.9 26.5 - 33.0 pg    MCHC 33.6 31.5 - 36.5 g/dL    RDW 12.3 10.0 - 15.0 %    Platelet Count 170 150 - 450 10e3/uL    % Neutrophils 71 %    % Lymphocytes 23 %    % Monocytes 5 %    % Eosinophils 1 %    % Basophils 0 %    % Immature Granulocytes 0 %    NRBCs per 100 WBC 0 <1 /100    Absolute Neutrophils 5.2 1.6 - 8.3 10e3/uL    Absolute Lymphocytes 1.7 0.8 - 5.3 10e3/uL    Absolute Monocytes 0.4 0.0 - 1.3 10e3/uL    Absolute Eosinophils 0.1 0.0 - 0.7 10e3/uL    Absolute Basophils 0.0 0.0 - 0.2 10e3/uL    Absolute Immature Granulocytes 0.0 <=0.4 10e3/uL    Absolute NRBCs  0.0 10e3/uL   CBC with platelets and differential     Status: None    Narrative    The following orders were created for panel order CBC with platelets and differential.  Procedure                               Abnormality         Status                     ---------                               -----------         ------                     CBC with platelets and d...[[id number]]                      Final result                 Please view results for these tests on the individual orders.   Results for orders placed or performed in visit on 09/05/23   XR KUB     Status: None    Narrative    XR KUB   9/5/2023 8:25 AM     HISTORY: Calculus of kidney    COMPARISON: 3/27/2023      Impression    IMPRESSION: Unchanged 5 mm left renal calculus. No radiographically  evident right renal calculi. No calculi along the course of the  ureters. An indeterminate 18 mm oval calcification projects over the  right iliac bone, not previously visualized. Consider a follow-up CT  of the abdomen and pelvis for better evaluation.    HARJEET MCCOLLUM MD         SYSTEM ID:  [id number]   Results for orders placed or performed in visit on 09/05/23   Urine Culture     Status: Abnormal (Preliminary result)    Specimen: Urine, Midstream   Result Value Ref Range    Culture Culture in progress     Culture 10,000-50,000 CFU/mL Urogenital albino     Culture <10,000 CFU/mL Enterococcus faecalis (A)     Culture <10,000 CFU/mL Urogenital albino

## 2023-09-05 NOTE — H&P (VIEW-ONLY)
[address] [address]  Phone: [phone]  Primary Provider: Camila Pederson  Pre-op Performing Provider: CAMILA PEDERSON      PREOPERATIVE EVALUATION:  Today's date: 9/5/2023    Khushi Sawant is a 58 year old female who presents for a preoperative evaluation.      9/5/2023     7:46 AM   Additional Questions   Roomed by Csaba CMA   Accompanied by Self       Surgical Information:  Surgery/Procedure: NEPHROLITHOTOMY, PERCUTANEOUS, USING HOLMIUM LASER LEFT, CYSTOSCOPY WITH RETROGRADE PYELOGRAM, URETEROSCOPY, HOLMIUM LASER LITHOTRIPSY OF URETERAL CALCULUS, AND STENT INSERTION   Surgery Location: [facility]  Surgeon: Dr Hernan Jones  Surgery Date: 09/13/2023  Time of Surgery: 7:30a  Where patient plans to recover: At home with family  Fax number for surgical facility: Note does not need to be faxed, will be available electronically in Epic.    Assessment & Plan     The proposed surgical procedure is considered INTERMEDIATE risk.    Preop general physical exam  Calculus of kidney  Cleared for surgical procedure without further work-up needed.  Khushi verbalizes understanding of plan of care and is in agreement.    - Urine Culture  - XR KUB    Abnormal Xray of Abdomen  KUB that was done by patient's surgeon notes a 18 mm calcification over the right iliac bone not previously there.  Call placed to [facility] and they accept referral to proceed with urgent CT of her abdomen/pelvis for better determination of what this is and proceed with plan of care based on those results.  She will proceed to the [facility] at 1 PM.  She is to not have any further food at this time.    Khushi verbalizes understanding of plan of care and is in agreement.      IMPRESSION: Unchanged 5 mm left renal calculus. No radiographically  evident right renal calculi. No calculi along the course of the  ureters. An  indeterminate 18 mm oval calcification projects over the  right iliac bone, not previously visualized. Consider a follow-up CT  of the abdomen and pelvis for better evaluation.       - No identified additional risk factors other than previously addressed    Antiplatelet or Anticoagulation Medication Instructions:   - Patient is on no antiplatelet or anticoagulation medications.    Additional Medication Instructions:  Patient is on no additional chronic medications    RECOMMENDATION:  APPROVAL GIVEN to proceed with proposed procedure, without further diagnostic evaluation.    Subjective     HPI related to upcoming procedure: Persisting left Nephrolithiasis        8/29/2023     8:53 AM   Preop Questions   1. Have you ever had a heart attack or stroke? No   2. Have you ever had surgery on your heart or blood vessels, such as a stent placement, a coronary artery bypass, or surgery on an artery in your head, neck, heart, or legs? No   3. Do you have chest pain with activity? No   4. Do you have a history of  heart failure? No   5. Do you currently have a cold, bronchitis or symptoms of other infection? No   6. Do you have a cough, shortness of breath, or wheezing? No   7. Do you or anyone in your family have previous history of blood clots? No   8. Do you or does anyone in your family have a serious bleeding problem such as prolonged bleeding following surgeries or cuts? No   9. Have you ever had problems with anemia or been told to take iron pills? No   10. Have you had any abnormal blood loss such as black, tarry or bloody stools, or abnormal vaginal bleeding? No   11. Have you ever had a blood transfusion? No   12. Are you willing to have a blood transfusion if it is medically needed before, during, or after your surgery? Yes   13. Have you or any of your relatives ever had problems with anesthesia? No   14. Do you have sleep apnea, excessive snoring or daytime drowsiness? No   15. Do you have any artifical heart  valves or other implanted medical devices like a pacemaker, defibrillator, or continuous glucose monitor? No   16. Do you have artificial joints? No   17. Are you allergic to latex? No   18. Is there any chance that you may be pregnant? No       Health Care Directive:  Patient does not have a Health Care Directive or Living Will: Discussed advance care planning with patient; however, patient declined at this time.    Preoperative Review of :   reviewed - no record of controlled substances prescribed.    Status of Chronic Conditions:  See problem list for active medical problems.  Problems all longstanding and stable, except as noted/documented.  See ROS for pertinent symptoms related to these conditions.    Review of Systems  Constitutional, HEENT, cardiovascular, pulmonary, GI, , musculoskeletal, neuro, skin, endocrine and psych systems are negative, except as otherwise noted in the HPI.     Patient Active Problem List    Diagnosis Date Noted    Sessile colonic polyp  -  normal  [facility] - 5 year f/u recommended 10/31/2022     Priority: Medium    S/P laparoscopic supracervical hysterectomy 10/31/2022     Priority: Medium    History of Clostridioides difficile colitis 10/31/2022     Priority: Medium    Kidney stone 10/31/2022     Priority: Medium    Caliectasis 10/31/2022     Priority: Medium      History reviewed. No pertinent past medical history.  Past Surgical History:   Procedure Laterality Date    ABDOMEN SURGERY      2 c-sections     SECTION      COLONOSCOPY N/A 2015    Procedure: COLONOSCOPY w/ placement of hemoclip x2;  Surgeon: Nena Anaya MD;  Location: [facility];  Service:     COMBINED CYSTOSCOPY, INSERT STENT URETER(S) Right 2022    Procedure: CYSTOURETEROSCOPY, WITH LASER LITHOTRIPSY, CALCULUS REMOVAL AND URETERAL STENT INSERTION;  Surgeon: Garfield Paz MD;  Location: [facility]    COMBINED CYSTOSCOPY, INSERT STENT URETER(S) Left  "02/28/2023    Procedure: CYSTOURETEROSCOPY, RETROGRADE PYELOGRAM, AND URETERAL STENT INSERTION;  Surgeon: Garfield Paz MD;  Location: [facility]    GYN SURGERY  2020    Laparoscopy Supracervical Hysterectomy    HYSTERECTOMY      for menorrhagia     Current Outpatient Medications   Medication Sig Dispense Refill    Cranberry 500 MG TABS       lactobacillus rhamnosus (GG) (CULTURELL) capsule Take 1 capsule by mouth 2 times daily         Allergies   Allergen Reactions    Effexor [Venlafaxine] Anxiety        Social History     Tobacco Use    Smoking status: Never    Smokeless tobacco: Never   Substance Use Topics    Alcohol use: Yes     Comment: couple glasses of wine a week     Family History   Problem Relation Age of Onset    Family History Negative Mother     Coronary Artery Disease Mother         A fib    Hyperlipidemia Mother     Family History Negative Father     Diabetes Father         weight controlled     History   Drug Use Unknown         Objective     /70 (BP Location: Left arm, Patient Position: Chair, Cuff Size: Adult Large)   Pulse 92   Temp 98  F (36.7  C) (Tympanic)   Resp 12   Ht 1.689 m (5' 6.5\")   Wt 76.2 kg (168 lb)   SpO2 98%   BMI 26.71 kg/m      Physical Exam    GENERAL APPEARANCE: healthy, alert and no distress     EYES: EOMI, PERRL     HENT: ear canals and TM's normal and nose and mouth without ulcers or lesions     NECK: no adenopathy, no asymmetry, masses, or scars and thyroid normal to palpation     RESP: lungs clear to auscultation - no rales, rhonchi or wheezes     CV: regular rates and rhythm, normal S1 S2, no S3 or S4 and no murmur, click or rub     ABDOMEN:  soft, nontender, no HSM or masses and bowel sounds normal     MS: extremities normal- no gross deformities noted, no evidence of inflammation in joints, FROM in all extremities.     SKIN: no suspicious lesions or rashes     NEURO: Normal strength and tone, sensory exam grossly normal, mentation intact and " speech normal     PSYCH: mentation appears normal. and affect normal/bright     LYMPHATICS: No cervical adenopathy    Recent Labs   Lab Test 01/27/23  0936 12/06/22  0958 11/30/22  0907 10/31/22  1647   HGB  --   --  14.7 15.1   PLT  --   --  189 208    140 139 139   POTASSIUM 4.3 4.1 4.2 4.1   CR 0.70 0.66 0.61 0.66   A1C  --  5.6  --   --         Diagnostics:  Culture and KUB  pending.   No EKG required, no history of coronary heart disease, significant arrhythmia, peripheral arterial disease or other structural heart disease.    Revised Cardiac Risk Index (RCRI):  The patient has the following serious cardiovascular risks for perioperative complications:   - No serious cardiac risks = 0 points     RCRI Interpretation: 0 points: Class I (very low risk - 0.4% complication rate)            Signed Electronically by: ROSE MARIE Vergara CNP  Copy of this evaluation report is provided to requesting physician.

## 2023-09-05 NOTE — PROGRESS NOTES
[address] [address]  Phone: [phone]  Primary Provider: Camila Pederson  Pre-op Performing Provider: CAMILA PEDERSON      PREOPERATIVE EVALUATION:  Today's date: 9/5/2023    Khushi Sawant is a 58 year old female who presents for a preoperative evaluation.      9/5/2023     7:46 AM   Additional Questions   Roomed by Csaba CMA   Accompanied by Self       Surgical Information:  Surgery/Procedure: NEPHROLITHOTOMY, PERCUTANEOUS, USING HOLMIUM LASER LEFT, CYSTOSCOPY WITH RETROGRADE PYELOGRAM, URETEROSCOPY, HOLMIUM LASER LITHOTRIPSY OF URETERAL CALCULUS, AND STENT INSERTION   Surgery Location: [facility]  Surgeon: Dr Hernan Jones  Surgery Date: 09/13/2023  Time of Surgery: 7:30a  Where patient plans to recover: At home with family  Fax number for surgical facility: Note does not need to be faxed, will be available electronically in Epic.    Assessment & Plan     The proposed surgical procedure is considered INTERMEDIATE risk.    Preop general physical exam  Calculus of kidney  Cleared for surgical procedure without further work-up needed.  Khushi verbalizes understanding of plan of care and is in agreement.    - Urine Culture  - XR KUB    Abnormal Xray of Abdomen  KUB that was done by patient's surgeon notes a 18 mm calcification over the right iliac bone not previously there.  Call placed to [facility] and they accept referral to proceed with urgent CT of her abdomen/pelvis for better determination of what this is and proceed with plan of care based on those results.  She will proceed to the [facility] at 1 PM.  She is to not have any further food at this time.    Khushi verbalizes understanding of plan of care and is in agreement.      IMPRESSION: Unchanged 5 mm left renal calculus. No radiographically  evident right renal calculi. No calculi along the course of the  ureters. An  indeterminate 18 mm oval calcification projects over the  right iliac bone, not previously visualized. Consider a follow-up CT  of the abdomen and pelvis for better evaluation.       - No identified additional risk factors other than previously addressed    Antiplatelet or Anticoagulation Medication Instructions:   - Patient is on no antiplatelet or anticoagulation medications.    Additional Medication Instructions:  Patient is on no additional chronic medications    RECOMMENDATION:  APPROVAL GIVEN to proceed with proposed procedure, without further diagnostic evaluation.    Subjective     HPI related to upcoming procedure: Persisting left Nephrolithiasis        8/29/2023     8:53 AM   Preop Questions   1. Have you ever had a heart attack or stroke? No   2. Have you ever had surgery on your heart or blood vessels, such as a stent placement, a coronary artery bypass, or surgery on an artery in your head, neck, heart, or legs? No   3. Do you have chest pain with activity? No   4. Do you have a history of  heart failure? No   5. Do you currently have a cold, bronchitis or symptoms of other infection? No   6. Do you have a cough, shortness of breath, or wheezing? No   7. Do you or anyone in your family have previous history of blood clots? No   8. Do you or does anyone in your family have a serious bleeding problem such as prolonged bleeding following surgeries or cuts? No   9. Have you ever had problems with anemia or been told to take iron pills? No   10. Have you had any abnormal blood loss such as black, tarry or bloody stools, or abnormal vaginal bleeding? No   11. Have you ever had a blood transfusion? No   12. Are you willing to have a blood transfusion if it is medically needed before, during, or after your surgery? Yes   13. Have you or any of your relatives ever had problems with anesthesia? No   14. Do you have sleep apnea, excessive snoring or daytime drowsiness? No   15. Do you have any artifical heart  valves or other implanted medical devices like a pacemaker, defibrillator, or continuous glucose monitor? No   16. Do you have artificial joints? No   17. Are you allergic to latex? No   18. Is there any chance that you may be pregnant? No       Health Care Directive:  Patient does not have a Health Care Directive or Living Will: Discussed advance care planning with patient; however, patient declined at this time.    Preoperative Review of :   reviewed - no record of controlled substances prescribed.    Status of Chronic Conditions:  See problem list for active medical problems.  Problems all longstanding and stable, except as noted/documented.  See ROS for pertinent symptoms related to these conditions.    Review of Systems  Constitutional, HEENT, cardiovascular, pulmonary, GI, , musculoskeletal, neuro, skin, endocrine and psych systems are negative, except as otherwise noted in the HPI.     Patient Active Problem List    Diagnosis Date Noted    Sessile colonic polyp  -  normal  [facility] - 5 year f/u recommended 10/31/2022     Priority: Medium    S/P laparoscopic supracervical hysterectomy 10/31/2022     Priority: Medium    History of Clostridioides difficile colitis 10/31/2022     Priority: Medium    Kidney stone 10/31/2022     Priority: Medium    Caliectasis 10/31/2022     Priority: Medium      History reviewed. No pertinent past medical history.  Past Surgical History:   Procedure Laterality Date    ABDOMEN SURGERY      2 c-sections     SECTION      COLONOSCOPY N/A 2015    Procedure: COLONOSCOPY w/ placement of hemoclip x2;  Surgeon: Nena Anaya MD;  Location: [facility];  Service:     COMBINED CYSTOSCOPY, INSERT STENT URETER(S) Right 2022    Procedure: CYSTOURETEROSCOPY, WITH LASER LITHOTRIPSY, CALCULUS REMOVAL AND URETERAL STENT INSERTION;  Surgeon: Garfield Paz MD;  Location: [facility] OR    COMBINED CYSTOSCOPY, INSERT STENT URETER(S) Left  "02/28/2023    Procedure: CYSTOURETEROSCOPY, RETROGRADE PYELOGRAM, AND URETERAL STENT INSERTION;  Surgeon: Garfield Paz MD;  Location: [facility]    GYN SURGERY  2020    Laparoscopy Supracervical Hysterectomy    HYSTERECTOMY      for menorrhagia     Current Outpatient Medications   Medication Sig Dispense Refill    Cranberry 500 MG TABS       lactobacillus rhamnosus (GG) (CULTURELL) capsule Take 1 capsule by mouth 2 times daily         Allergies   Allergen Reactions    Effexor [Venlafaxine] Anxiety        Social History     Tobacco Use    Smoking status: Never    Smokeless tobacco: Never   Substance Use Topics    Alcohol use: Yes     Comment: couple glasses of wine a week     Family History   Problem Relation Age of Onset    Family History Negative Mother     Coronary Artery Disease Mother         A fib    Hyperlipidemia Mother     Family History Negative Father     Diabetes Father         weight controlled     History   Drug Use Unknown         Objective     /70 (BP Location: Left arm, Patient Position: Chair, Cuff Size: Adult Large)   Pulse 92   Temp 98  F (36.7  C) (Tympanic)   Resp 12   Ht 1.689 m (5' 6.5\")   Wt 76.2 kg (168 lb)   SpO2 98%   BMI 26.71 kg/m      Physical Exam    GENERAL APPEARANCE: healthy, alert and no distress     EYES: EOMI, PERRL     HENT: ear canals and TM's normal and nose and mouth without ulcers or lesions     NECK: no adenopathy, no asymmetry, masses, or scars and thyroid normal to palpation     RESP: lungs clear to auscultation - no rales, rhonchi or wheezes     CV: regular rates and rhythm, normal S1 S2, no S3 or S4 and no murmur, click or rub     ABDOMEN:  soft, nontender, no HSM or masses and bowel sounds normal     MS: extremities normal- no gross deformities noted, no evidence of inflammation in joints, FROM in all extremities.     SKIN: no suspicious lesions or rashes     NEURO: Normal strength and tone, sensory exam grossly normal, mentation intact and " speech normal     PSYCH: mentation appears normal. and affect normal/bright     LYMPHATICS: No cervical adenopathy    Recent Labs   Lab Test 01/27/23  0936 12/06/22  0958 11/30/22  0907 10/31/22  1647   HGB  --   --  14.7 15.1   PLT  --   --  189 208    140 139 139   POTASSIUM 4.3 4.1 4.2 4.1   CR 0.70 0.66 0.61 0.66   A1C  --  5.6  --   --         Diagnostics:  Culture and KUB  pending.   No EKG required, no history of coronary heart disease, significant arrhythmia, peripheral arterial disease or other structural heart disease.    Revised Cardiac Risk Index (RCRI):  The patient has the following serious cardiovascular risks for perioperative complications:   - No serious cardiac risks = 0 points     RCRI Interpretation: 0 points: Class I (very low risk - 0.4% complication rate)            Signed Electronically by: ROSE MARIE Vergara CNP  Copy of this evaluation report is provided to requesting physician.

## 2023-09-08 DIAGNOSIS — N39.0 UTI (URINARY TRACT INFECTION): Primary | ICD-10-CM

## 2023-09-08 RX ORDER — NITROFURANTOIN 25; 75 MG/1; MG/1
100 CAPSULE ORAL 2 TIMES DAILY
Qty: 24 CAPSULE | Refills: 0 | Status: ON HOLD | OUTPATIENT
Start: 2023-09-08 | End: 2023-09-13

## 2023-09-09 LAB
BACTERIA UR CULT: ABNORMAL

## 2023-09-12 ENCOUNTER — ANESTHESIA EVENT (OUTPATIENT)
Dept: SURGERY | Facility: CLINIC | Age: 58
End: 2023-09-12
Payer: COMMERCIAL

## 2023-09-12 NOTE — ANESTHESIA PREPROCEDURE EVALUATION
Anesthesia Pre-Procedure Evaluation    Patient: Khushi Sawant   MRN: [id number] : 1965        Procedure : Procedure(s):  NEPHROLITHOTOMY, PERCUTANEOUS, USING HOLMIUM LASER LEFT  CYSTOSCOPY WITH RETROGRADE PYELOGRAM, URETEROSCOPY, HOLMIUM LASER LITHOTRIPSY OF URETERAL CALCULUS, AND STENT INSERTION          History reviewed. No pertinent past medical history.   Past Surgical History:   Procedure Laterality Date    ABDOMEN SURGERY      2 c-sections     SECTION      COLONOSCOPY N/A 2015    Procedure: COLONOSCOPY w/ placement of hemoclip x2;  Surgeon: Nena Anaya MD;  Location: [facility];  Service:     COMBINED CYSTOSCOPY, INSERT STENT URETER(S) Right 2022    Procedure: CYSTOURETEROSCOPY, WITH LASER LITHOTRIPSY, CALCULUS REMOVAL AND URETERAL STENT INSERTION;  Surgeon: Garfield Paz MD;  Location: [facility]    COMBINED CYSTOSCOPY, INSERT STENT URETER(S) Left 2023    Procedure: CYSTOURETEROSCOPY, RETROGRADE PYELOGRAM, AND URETERAL STENT INSERTION;  Surgeon: Garfield Paz MD;  Location: [facility] OR    GYN SURGERY      Laparoscopy Supracervical Hysterectomy    HYSTERECTOMY      for menorrhagia      Allergies   Allergen Reactions    Effexor [Venlafaxine] Anxiety      Social History     Tobacco Use    Smoking status: Never    Smokeless tobacco: Never   Substance Use Topics    Alcohol use: Yes     Comment: couple glasses of wine a week      Wt Readings from Last 1 Encounters:   23 76.2 kg (168 lb)        Anesthesia Evaluation   Pt has had prior anesthetic. Type: General.        ROS/MED HX  ENT/Pulmonary:       Neurologic:       Cardiovascular:       METS/Exercise Tolerance:     Hematologic:       Musculoskeletal:       GI/Hepatic:     (+)       Inflammatory bowel disease (history of C diff colitis),             Renal/Genitourinary:     (+) renal disease,      Nephrolithiasis ,       Endo:       Psychiatric/Substance Use:        Infectious Disease:       Malignancy:       Other:            Physical Exam    Airway        Mallampati: II   TM distance: > 3 FB   Neck ROM: full   Mouth opening: > 3 cm    Respiratory Devices and Support         Dental       (+) Minor Abnormalities - some fillings, tiny chips      Cardiovascular   cardiovascular exam normal          Pulmonary   pulmonary exam normal                OUTSIDE LABS:  CBC:   Lab Results   Component Value Date    WBC 7.5 09/05/2023    WBC 4.9 11/30/2022    HGB 14.2 09/05/2023    HGB 14.7 11/30/2022    HCT 42.2 09/05/2023    HCT 44.0 11/30/2022     09/05/2023     11/30/2022     BMP:   Lab Results   Component Value Date     09/05/2023     01/27/2023    POTASSIUM 4.1 09/05/2023    POTASSIUM 4.3 01/27/2023    CHLORIDE 104 09/05/2023    CHLORIDE 105 01/27/2023    CO2 25 09/05/2023    CO2 24 01/27/2023    BUN 12.6 09/05/2023    BUN 16.2 01/27/2023    CR 0.72 09/05/2023    CR 0.70 01/27/2023     (H) 09/05/2023     (H) 01/27/2023     COAGS: No results found for: PTT, INR, FIBR  POC:   Lab Results   Component Value Date    HCG Negative 06/03/2008     HEPATIC:   Lab Results   Component Value Date    ALBUMIN 4.7 09/05/2023    PROTTOTAL 7.0 09/05/2023    ALT 24 09/05/2023    AST 29 09/05/2023    ALKPHOS 57 09/05/2023    BILITOTAL 0.4 09/05/2023     OTHER:   Lab Results   Component Value Date    A1C 5.6 12/06/2022    VIKTORIYA 10.0 09/05/2023    PHOS 3.7 01/27/2023    TSH 0.49 12/06/2022    SED 16 10/31/2022       Anesthesia Plan    ASA Status:  2       Anesthesia Type: General.     - Airway: ETT   Induction: Intravenous, Propofol.           Consents    Anesthesia Plan(s) and associated risks, benefits, and realistic alternatives discussed. Questions answered and patient/representative(s) expressed understanding.     - Discussed: Risks, Benefits and Alternatives for the PROCEDURE were discussed     - Discussed with:  Patient      - Extended Intubation/Ventilatory  Support Discussed: No.      - Patient is DNR/DNI Status: No     Use of blood products discussed: No .     Postoperative Care    Pain management: IV analgesics, Multi-modal analgesia.   PONV prophylaxis: Ondansetron (or other 5HT-3), Dexamethasone or Solumedrol     Comments:                MAXIMILIAN LADD MD

## 2023-09-13 ENCOUNTER — APPOINTMENT (OUTPATIENT)
Dept: GENERAL RADIOLOGY | Facility: CLINIC | Age: 58
End: 2023-09-13
Attending: UROLOGY
Payer: COMMERCIAL

## 2023-09-13 ENCOUNTER — ANESTHESIA (OUTPATIENT)
Dept: SURGERY | Facility: CLINIC | Age: 58
End: 2023-09-13
Payer: COMMERCIAL

## 2023-09-13 ENCOUNTER — TELEPHONE (OUTPATIENT)
Dept: UROLOGY | Facility: CLINIC | Age: 58
End: 2023-09-13

## 2023-09-13 ENCOUNTER — HOSPITAL ENCOUNTER (OUTPATIENT)
Facility: CLINIC | Age: 58
Discharge: HOME | End: 2023-09-13
Attending: UROLOGY | Admitting: UROLOGY
Payer: COMMERCIAL

## 2023-09-13 VITALS
BODY MASS INDEX: 25.06 KG/M2 | SYSTOLIC BLOOD PRESSURE: 149 MMHG | WEIGHT: 165.34 LBS | RESPIRATION RATE: 16 BRPM | HEIGHT: 68 IN | TEMPERATURE: 98 F | DIASTOLIC BLOOD PRESSURE: 73 MMHG | OXYGEN SATURATION: 99 % | HEART RATE: 67 BPM

## 2023-09-13 DIAGNOSIS — N20.0 KIDNEY STONE: Primary | ICD-10-CM

## 2023-09-13 DIAGNOSIS — N39.0 UTI (URINARY TRACT INFECTION): ICD-10-CM

## 2023-09-13 LAB — GLUCOSE BLDC GLUCOMTR-MCNC: 94 MG/DL (ref 70–99)

## 2023-09-13 PROCEDURE — 272N000001 HC OR GENERAL SUPPLY STERILE: Performed by: UROLOGY

## 2023-09-13 PROCEDURE — 250N000025 HC SEVOFLURANE, PER MIN: Performed by: UROLOGY

## 2023-09-13 PROCEDURE — 360N000083 HC SURGERY LEVEL 3 W/ FLUORO, PER MIN: Performed by: UROLOGY

## 2023-09-13 PROCEDURE — C2617 STENT, NON-COR, TEM W/O DEL: HCPCS | Performed by: UROLOGY

## 2023-09-13 PROCEDURE — 370N000017 HC ANESTHESIA TECHNICAL FEE, PER MIN: Performed by: UROLOGY

## 2023-09-13 PROCEDURE — 258N000003 HC RX IP 258 OP 636: Performed by: UROLOGY

## 2023-09-13 PROCEDURE — C1887 CATHETER, GUIDING: HCPCS | Performed by: UROLOGY

## 2023-09-13 PROCEDURE — 250N000009 HC RX 250: Performed by: NURSE ANESTHETIST, CERTIFIED REGISTERED

## 2023-09-13 PROCEDURE — 250N000011 HC RX IP 250 OP 636: Performed by: UROLOGY

## 2023-09-13 PROCEDURE — 250N000011 HC RX IP 250 OP 636: Performed by: NURSE ANESTHETIST, CERTIFIED REGISTERED

## 2023-09-13 PROCEDURE — 258N000003 HC RX IP 258 OP 636: Performed by: NURSE ANESTHETIST, CERTIFIED REGISTERED

## 2023-09-13 PROCEDURE — 82962 GLUCOSE BLOOD TEST: CPT

## 2023-09-13 PROCEDURE — 52356 CYSTO/URETERO W/LITHOTRIPSY: CPT | Mod: LT | Performed by: UROLOGY

## 2023-09-13 PROCEDURE — 74420 UROGRAPHY RTRGR +-KUB: CPT | Mod: 26 | Performed by: UROLOGY

## 2023-09-13 PROCEDURE — 82365 CALCULUS SPECTROSCOPY: CPT | Performed by: UROLOGY

## 2023-09-13 PROCEDURE — C1894 INTRO/SHEATH, NON-LASER: HCPCS | Performed by: UROLOGY

## 2023-09-13 PROCEDURE — 999N000180 XR SURGERY CARM FLUORO LESS THAN 5 MIN: Mod: TC

## 2023-09-13 PROCEDURE — 999N000141 HC STATISTIC PRE-PROCEDURE NURSING ASSESSMENT: Performed by: UROLOGY

## 2023-09-13 PROCEDURE — C1769 GUIDE WIRE: HCPCS | Performed by: UROLOGY

## 2023-09-13 PROCEDURE — C1758 CATHETER, URETERAL: HCPCS | Performed by: UROLOGY

## 2023-09-13 PROCEDURE — 255N000002 HC RX 255 OP 636: Performed by: UROLOGY

## 2023-09-13 PROCEDURE — 710N000012 HC RECOVERY PHASE 2, PER MINUTE: Performed by: UROLOGY

## 2023-09-13 PROCEDURE — 710N000010 HC RECOVERY PHASE 1, LEVEL 2, PER MIN: Performed by: UROLOGY

## 2023-09-13 PROCEDURE — 250N000011 HC RX IP 250 OP 636: Mod: JZ | Performed by: NURSE ANESTHETIST, CERTIFIED REGISTERED

## 2023-09-13 DEVICE — BLACK SILICONE FILIFORM DOUBLE PIGTAIL URETERAL STENT SET
Type: IMPLANTABLE DEVICE | Site: KIDNEY | Status: FUNCTIONAL
Brand: COOK

## 2023-09-13 RX ORDER — AMPICILLIN 2 G/1
2 INJECTION, POWDER, FOR SOLUTION INTRAVENOUS ONCE
Status: COMPLETED | OUTPATIENT
Start: 2023-09-13 | End: 2023-09-13

## 2023-09-13 RX ORDER — PROPOFOL 10 MG/ML
INJECTION, EMULSION INTRAVENOUS PRN
Status: DISCONTINUED | OUTPATIENT
Start: 2023-09-13 | End: 2023-09-13

## 2023-09-13 RX ORDER — IBUPROFEN 200 MG
400 TABLET ORAL EVERY 6 HOURS PRN
COMMUNITY
Start: 2023-09-13 | End: 2023-09-20

## 2023-09-13 RX ORDER — TOLTERODINE 2 MG/1
2 CAPSULE, EXTENDED RELEASE ORAL DAILY PRN
Qty: 7 CAPSULE | Refills: 0 | Status: SHIPPED | OUTPATIENT
Start: 2023-09-13 | End: 2023-09-20

## 2023-09-13 RX ORDER — ONDANSETRON 2 MG/ML
INJECTION INTRAMUSCULAR; INTRAVENOUS PRN
Status: DISCONTINUED | OUTPATIENT
Start: 2023-09-13 | End: 2023-09-13

## 2023-09-13 RX ORDER — NITROFURANTOIN 25; 75 MG/1; MG/1
100 CAPSULE ORAL ONCE
Qty: 1 CAPSULE | Refills: 0 | Status: SHIPPED | OUTPATIENT
Start: 2023-09-13 | End: 2023-09-13

## 2023-09-13 RX ORDER — ONDANSETRON 4 MG/1
4 TABLET, ORALLY DISINTEGRATING ORAL EVERY 30 MIN PRN
Status: DISCONTINUED | OUTPATIENT
Start: 2023-09-13 | End: 2023-09-13 | Stop reason: HOSPADM

## 2023-09-13 RX ORDER — LIDOCAINE 40 MG/G
CREAM TOPICAL
Status: DISCONTINUED | OUTPATIENT
Start: 2023-09-13 | End: 2023-09-13 | Stop reason: HOSPADM

## 2023-09-13 RX ORDER — EPHEDRINE SULFATE 50 MG/ML
INJECTION, SOLUTION INTRAMUSCULAR; INTRAVENOUS; SUBCUTANEOUS PRN
Status: DISCONTINUED | OUTPATIENT
Start: 2023-09-13 | End: 2023-09-13

## 2023-09-13 RX ORDER — SODIUM CHLORIDE, SODIUM LACTATE, POTASSIUM CHLORIDE, CALCIUM CHLORIDE 600; 310; 30; 20 MG/100ML; MG/100ML; MG/100ML; MG/100ML
INJECTION, SOLUTION INTRAVENOUS CONTINUOUS
Status: DISCONTINUED | OUTPATIENT
Start: 2023-09-13 | End: 2023-09-13 | Stop reason: HOSPADM

## 2023-09-13 RX ORDER — FENTANYL CITRATE 50 UG/ML
25 INJECTION, SOLUTION INTRAMUSCULAR; INTRAVENOUS EVERY 5 MIN PRN
Status: DISCONTINUED | OUTPATIENT
Start: 2023-09-13 | End: 2023-09-13 | Stop reason: HOSPADM

## 2023-09-13 RX ORDER — ONDANSETRON 2 MG/ML
4 INJECTION INTRAMUSCULAR; INTRAVENOUS EVERY 30 MIN PRN
Status: DISCONTINUED | OUTPATIENT
Start: 2023-09-13 | End: 2023-09-13 | Stop reason: HOSPADM

## 2023-09-13 RX ORDER — LIDOCAINE HYDROCHLORIDE 20 MG/ML
INJECTION, SOLUTION INFILTRATION; PERINEURAL PRN
Status: DISCONTINUED | OUTPATIENT
Start: 2023-09-13 | End: 2023-09-13

## 2023-09-13 RX ORDER — FENTANYL CITRATE 50 UG/ML
50 INJECTION, SOLUTION INTRAMUSCULAR; INTRAVENOUS EVERY 5 MIN PRN
Status: DISCONTINUED | OUTPATIENT
Start: 2023-09-13 | End: 2023-09-13 | Stop reason: HOSPADM

## 2023-09-13 RX ORDER — OXYCODONE HYDROCHLORIDE 5 MG/1
5 TABLET ORAL
Status: DISCONTINUED | OUTPATIENT
Start: 2023-09-13 | End: 2023-09-13 | Stop reason: HOSPADM

## 2023-09-13 RX ORDER — HYDROMORPHONE HYDROCHLORIDE 1 MG/ML
0.2 INJECTION, SOLUTION INTRAMUSCULAR; INTRAVENOUS; SUBCUTANEOUS EVERY 5 MIN PRN
Status: DISCONTINUED | OUTPATIENT
Start: 2023-09-13 | End: 2023-09-13 | Stop reason: HOSPADM

## 2023-09-13 RX ORDER — HYDROMORPHONE HYDROCHLORIDE 1 MG/ML
0.4 INJECTION, SOLUTION INTRAMUSCULAR; INTRAVENOUS; SUBCUTANEOUS EVERY 5 MIN PRN
Status: DISCONTINUED | OUTPATIENT
Start: 2023-09-13 | End: 2023-09-13 | Stop reason: HOSPADM

## 2023-09-13 RX ORDER — SODIUM CHLORIDE, SODIUM LACTATE, POTASSIUM CHLORIDE, CALCIUM CHLORIDE 600; 310; 30; 20 MG/100ML; MG/100ML; MG/100ML; MG/100ML
INJECTION, SOLUTION INTRAVENOUS CONTINUOUS PRN
Status: DISCONTINUED | OUTPATIENT
Start: 2023-09-13 | End: 2023-09-13

## 2023-09-13 RX ORDER — DEXAMETHASONE SODIUM PHOSPHATE 4 MG/ML
INJECTION, SOLUTION INTRA-ARTICULAR; INTRALESIONAL; INTRAMUSCULAR; INTRAVENOUS; SOFT TISSUE PRN
Status: DISCONTINUED | OUTPATIENT
Start: 2023-09-13 | End: 2023-09-13

## 2023-09-13 RX ORDER — KETOROLAC TROMETHAMINE 30 MG/ML
INJECTION, SOLUTION INTRAMUSCULAR; INTRAVENOUS PRN
Status: DISCONTINUED | OUTPATIENT
Start: 2023-09-13 | End: 2023-09-13

## 2023-09-13 RX ORDER — OXYCODONE HYDROCHLORIDE 10 MG/1
10 TABLET ORAL
Status: DISCONTINUED | OUTPATIENT
Start: 2023-09-13 | End: 2023-09-13 | Stop reason: HOSPADM

## 2023-09-13 RX ORDER — TOLTERODINE TARTRATE 2 MG/1
2 TABLET, EXTENDED RELEASE ORAL ONCE
Status: DISCONTINUED | OUTPATIENT
Start: 2023-09-13 | End: 2023-09-13 | Stop reason: HOSPADM

## 2023-09-13 RX ORDER — PHENAZOPYRIDINE HYDROCHLORIDE 100 MG/1
100 TABLET, FILM COATED ORAL 3 TIMES DAILY PRN
Qty: 6 TABLET | Refills: 0 | Status: SHIPPED | OUTPATIENT
Start: 2023-09-13 | End: 2023-09-15

## 2023-09-13 RX ORDER — FENTANYL CITRATE 50 UG/ML
INJECTION, SOLUTION INTRAMUSCULAR; INTRAVENOUS PRN
Status: DISCONTINUED | OUTPATIENT
Start: 2023-09-13 | End: 2023-09-13

## 2023-09-13 RX ORDER — TAMSULOSIN HYDROCHLORIDE 0.4 MG/1
0.4 CAPSULE ORAL DAILY
Qty: 7 CAPSULE | Refills: 0 | Status: SHIPPED | OUTPATIENT
Start: 2023-09-13 | End: 2023-09-20

## 2023-09-13 RX ORDER — ACETAMINOPHEN 500 MG
500-1000 TABLET ORAL EVERY 6 HOURS PRN
Start: 2023-09-13 | End: 2023-09-20

## 2023-09-13 RX ADMIN — ONDANSETRON 4 MG: 2 INJECTION INTRAMUSCULAR; INTRAVENOUS at 08:33

## 2023-09-13 RX ADMIN — FENTANYL CITRATE 50 MCG: 50 INJECTION, SOLUTION INTRAMUSCULAR; INTRAVENOUS at 07:41

## 2023-09-13 RX ADMIN — KETOROLAC TROMETHAMINE 30 MG: 30 INJECTION, SOLUTION INTRAMUSCULAR at 08:33

## 2023-09-13 RX ADMIN — Medication 5 MG: at 08:28

## 2023-09-13 RX ADMIN — SODIUM CHLORIDE, POTASSIUM CHLORIDE, SODIUM LACTATE AND CALCIUM CHLORIDE: 600; 310; 30; 20 INJECTION, SOLUTION INTRAVENOUS at 07:32

## 2023-09-13 RX ADMIN — LIDOCAINE HYDROCHLORIDE 100 MG: 20 INJECTION, SOLUTION INFILTRATION; PERINEURAL at 07:41

## 2023-09-13 RX ADMIN — SUGAMMADEX 200 MG: 100 INJECTION, SOLUTION INTRAVENOUS at 08:48

## 2023-09-13 RX ADMIN — GENTAMICIN SULFATE 340 MG: 40 INJECTION, SOLUTION INTRAMUSCULAR; INTRAVENOUS at 07:22

## 2023-09-13 RX ADMIN — PROPOFOL 150 MG: 10 INJECTION, EMULSION INTRAVENOUS at 07:41

## 2023-09-13 RX ADMIN — DEXAMETHASONE SODIUM PHOSPHATE 4 MG: 4 INJECTION, SOLUTION INTRA-ARTICULAR; INTRALESIONAL; INTRAMUSCULAR; INTRAVENOUS; SOFT TISSUE at 08:00

## 2023-09-13 RX ADMIN — AMPICILLIN SODIUM 2 G: 2 INJECTION, POWDER, FOR SOLUTION INTRAMUSCULAR; INTRAVENOUS at 07:48

## 2023-09-13 RX ADMIN — Medication 50 MG: at 07:42

## 2023-09-13 ASSESSMENT — ACTIVITIES OF DAILY LIVING (ADL)
ADLS_ACUITY_SCORE: 35
ADLS_ACUITY_SCORE: 35

## 2023-09-13 NOTE — DISCHARGE INSTRUCTIONS
Heart Rate: 93

RR Interval: 645

P-R Interval: 164

QRSD Interval: 70

QT Interval: 312

QTC Interval: 388

P Axis: 27

QRS Axis: 9

T Wave Axis: -15

EKG Severity - BORDERLINE ECG -

EKG Impression: SINUS RHYTHM

EKG Impression: BORDERLINE T ABNORMALITIES, INFERIOR LEADS

Electronically Signed By: Adama Bravo 25-Jun-2018 02:37:31 Stent/Ureteroscopy:    Activity  - There are no activity restrictions    Diet:  You may resume your regular diet.     Common symptoms related to the stent:  - You will likely notice some blood in the urine for as long as the stent is in place. You may also notice more blood in the urine after physical activity. Normal urine color can range from yellow to dark red. This is not problematic as long as you are able to empty your bladder. Although urine may seem quite bloody, a few drops of blood can color the entire toilet bowl red- much like food coloring. Increase your fluid intake to help flush the blood out of your bladder.   - You may also notice a twinge of pain in your kidney during urination. This can be severe, but should get better after the first few days with the stent. This is due to urine traveling backward (up the stent into your kidney) when the bladder squeezes. It is normal and not a cause for concern.  - You may feel like you need to urinate more frequently than usual. Some patients experience a lower abdominal cramping or  spasm . You may notice urine leakage from your urethra after this happens. This is due to the stent rubbing against your bladder wall and should get better over the next few days. Additionally, your doctor may prescribe a medication to help with this called *** oxybutynin.   - You may experience some burning with urination due to minor trauma from the scope used during your surgery. This should disappear over the next few days.     Medications:  Anti-inflammatories/NSAIDs (Ibuprofen, Advil, Aleve) are the most effective pain relievers for stent-related discomfort. You may safely use these in combination with Tylenol.   You may also take Tylenol for pain control- but not more than 3000 mg daily.  Depending on your procedure, you may be given a limited supply of narcotic pain medications that you should use sparingly. These are typically not as effective as NSAIDs. These medications  causes constipation so make sure to take a stool softener along with it. Do not drive while under the influence of narcotic pain medications.  Flomax (Tamsulosin): This is a medication used to relax the ureter. It should help with flank pain associated with the stent. Take 0.4 mg (1 pill) every day.  Tolterodine (Detrol): This is a medication used to treat overactive bladder/bladder spasms related to the stent. You may take up to 5 mg three times a day. This medication causes constipation so make sure to take a stool softener along with it.   Pyridium: This is a bladder anesthetic that can help with urinary burning. It will turn your urine bright orange. You may take 100 mg up to three times daily, but limit using this to three days maximum if possible.   We recommend over the counter fiber (metamucil or benefiber) and stool softeners (miralax, docusate or senna) to prevent postoperative constipation, but stop if you develop diarrhea.    Removing your stent at home:  Some patients will discharge with a stent on a string- taped to the outside of your body.  On Monday 9/18/2023, remove the tape from your body and pull the string with gentle force until the stent is removed. This is a long, thin, black tube.   Some patient are more comfortable pulling the stent in the bathtub or shower  You may notice a  tugging  sensation in your flank, but it should not be severely painful  You may notice flank discomfort for the next 24 hours after the stent is removed.     Follow-Up:  - Follow up with Dr. Jones in 8 weeks for CT and review of 24 hr urine testing  - Call or return sooner than your regularly scheduled visit if you develop any of the following: fever (greater than 101.5), uncontrolled pain, uncontrolled nausea or vomiting, or inability to urinate.    Phone numbers:   - Monday through Friday 8am to 4:30pm: Call [phone] with questions, requests for medication refills, or to schedule or confirm an appointment.  -  "Nights or weekends: call the after hours emergency pager - [phone] and tell the  \"I would like to page the Urology Resident on call.\" Please note, due to prescribing laws, resident physicians are unable to prescribe narcotics after-hours. If you feel as though you will need a refill of a narcotic pain medication, you will need to call the clinic during business hours OR seek emergency care.  - For emergencies, call 911      Same-Day Surgery   Adult Discharge Orders & Instructions     For 24 hours after surgery:  Get plenty of rest.  A responsible adult must stay with you for at least 24 hours after you leave the hospital.   Pain medication can slow your reflexes. Do not drive or use heavy equipment.  If you have weakness or tingling, don't drive or use heavy equipment until this feeling goes away.  Mixing alcohol and pain medication can cause dizziness and slow your breathing. It can even be fatal. Do not drink alcohol while taking pain medication.  Avoid strenuous or risky activities.  Ask for help when climbing stairs.   You may feel lightheaded.  If so, sit for a few minutes before standing.  Have someone help you get up.   If you have nausea (feel sick to your stomach), drink only clear liquids such as apple juice, ginger ale, broth or 7-Up.  Rest may also help.  Be sure to drink enough fluids.  Move to a regular diet as you feel able. Take pain medications with a small amount of solid food, such as toast or crackers, to avoid nausea.   A slight fever is normal. Call the doctor if your fever is over 100 F (37.7 C) (taken under the tongue) or lasts longer than 24 hours.  You may have a dry mouth, muscle aches, trouble sleeping or a sore throat.  These symptoms should go away after 24 hours.  Do not make important or legal decisions.   Pain Management:      1. Take pain medication (if prescribed) for pain as directed by your physician.        2. WARNING: If the pain medication you have been prescribed " contains Tylenol  (acetaminophen), DO NOT take additional doses of Tylenol (acetaminophen).     Call your doctor for any of the followin.  Signs of infection (fever, growing tenderness at the surgery site, severe pain, a large amount of drainage or bleeding, foul-smelling drainage, redness, swelling).    2.  It has been over 8 to 10 hours since surgery and you are still not able to urinate (pee).    3.  Headache for over 24 hours.    4.  Numbness, tingling or weakness the day after surgery (if you had spinal anesthesia).  To contact a doctor, call   ' [phone] and ask for the Resident On Call for:          _____Urology_______ (answered 24 hours a day)  '   Emergency Department:  [facility]: [phone]  [facility]: [phone]               Rev. 10/2014

## 2023-09-13 NOTE — ANESTHESIA POSTPROCEDURE EVALUATION
Patient: Khushi Sawant    Procedure: Procedure(s):  CYSTOSCOPY WITH RETROGRADE PYELOGRAM, URETEROSCOPY, HOLMIUM LASER LITHOTRIPSY OF URETERAL CALCULUS, AND STENT INSERTION       Anesthesia Type:  General    Note:  Disposition: Outpatient   Postop Pain Control: Uneventful            Sign Out: Well controlled pain   PONV: No   Neuro/Psych: Uneventful            Sign Out: Acceptable/Baseline neuro status   Airway/Respiratory: Uneventful            Sign Out: Acceptable/Baseline resp. status   CV/Hemodynamics: Uneventful            Sign Out: Acceptable CV status; No obvious hypovolemia; No obvious fluid overload   Other NRE: NONE   DID A NON-ROUTINE EVENT OCCUR? No           Last vitals:  Vitals Value Taken Time   /82 09/13/23 0915   Temp 36.5  C (97.7  F) 09/13/23 0915   Pulse 65 09/13/23 0915   Resp 16 09/13/23 0915   SpO2 99 % 09/13/23 0915       Electronically Signed By: MAXIMILIAN LADD MD  September 13, 2023  9:21 AM

## 2023-09-13 NOTE — ANESTHESIA CARE TRANSFER NOTE
Patient: Khushi Sawant    Procedure: Procedure(s):  CYSTOSCOPY WITH RETROGRADE PYELOGRAM, URETEROSCOPY, HOLMIUM LASER LITHOTRIPSY OF URETERAL CALCULUS, AND STENT INSERTION       Diagnosis: Calculus of kidney [N20.0]  Diagnosis Additional Information: No value filed.    Anesthesia Type:   General     Note:    Oropharynx: oropharynx clear of all foreign objects and spontaneously breathing  Level of Consciousness: awake  Oxygen Supplementation: face mask  Level of Supplemental Oxygen (L/min / FiO2): 10  Independent Airway: airway patency satisfactory and stable  Dentition: dentition unchanged  Vital Signs Stable: post-procedure vital signs reviewed and stable  Report to RN Given: handoff report given  Patient transferred to: PACU    Handoff Report: Identifed the Patient, Identified the Reponsible Provider, Reviewed the pertinent medical history, Discussed the surgical course, Reviewed Intra-OP anesthesia mangement and issues during anesthesia, Set expectations for post-procedure period and Allowed opportunity for questions and acknowledgement of understanding      Vitals:  Vitals Value Taken Time   /71 09/13/23 0900   Temp 37.1  C (98.8  F) 09/13/23 0900   Pulse 68 09/13/23 0901   Resp 9 09/13/23 0901   SpO2 100 % 09/13/23 0901   Vitals shown include unvalidated device data.    Electronically Signed By: Maddie Soriano CRNA, ROSE MARIE WEST  September 13, 2023  9:01 AM

## 2023-09-13 NOTE — BRIEF OP NOTE
[facility]    Brief Operative Note    Pre-operative diagnosis: Calculus of kidney [N20.0]  Post-operative diagnosis Left renal stone behind a tight infundibulum    Procedure: Procedure(s):  CYSTOSCOPY WITH RETROGRADE PYELOGRAM, URETEROSCOPY, HOLMIUM LASER LITHOTRIPSY OF URETERAL CALCULUS, AND STENT INSERTION  Surgeon: Surgeon(s) and Role:     * Hernan Jones MD - Primary  Anesthesia: General   Estimated Blood Loss: Minimal    Drains: None  Specimens:   ID Type Source Tests Collected by Time Destination   A : left kidney stone for analysis Calculus/Stone Kidney, Left STONE ANALYSIS Hernan Jones MD 9/13/2023  8:22 AM      Findings:     6 mm renal stone found behind tight midpole anterior infundibulum  Infundibulum lasered posteriorly (left side on endoscope screen) down to fat to open it  Stone lasered and basketed within the calyx  A small plug in the calyx was lasered  Small lower pole stone basketed  A few areas of plugging lasered off in the lower pole  6 fr x 26 cm silicone (kaufDA black silicone) stent placed ON string    Complications: None.  Implants:   Implant Name Type Inv. Item Serial No.  Lot No. LRB No. Used Action   STENT URETERAL DBL PIGTAIL BLK CLEMENTE FILIFORM [id number] - [id number] Stent STENT URETERAL DBL PIGTAIL BLK CLEMENTE FILIFORM [id number]  [facility] [id number] Left 1 Implanted

## 2023-09-13 NOTE — OP NOTE
OPERATIVE REPORT    PREOPERATIVE DIAGNOSIS:   Left renal stone behind stenosed diverticulum, additional non-obstructing renal stone    POSTOPERATIVE DIAGNOSIS: Same    PROCEDURES PERFORMED:   Cystoscopy  Left retrograde pyelogram  Left ureteroscopy with laser lithotripsy and stone basket extraction  Left laser infundibulotomy  Left ureteral stent placement    STAFF SURGEON: Hernan Jones MD  ASSISTANT(S): None  ANESTHESIA: General  ESTIMATED BLOOD LOSS: 3 ml  COMPLICATIONS: None.   SPECIMEN: Left renal stone for analysis    SIGNIFICANT FINDINGS:   Left 6 mm renal stone behind narrow infundibulum  Narrow infundibulum opened with laser  Target stone lasered and basketed  2 mm lower pole stone removed via basket extraction  Few lower pole plugs and a plug near the target stone lasered  6 fr x 26 cm black silicone stent placed ON string    BRIEF OPERATIVE INDICATIONS: Khushi Sawant is a(n) 58 year old female who presented with a history of left flank pain with a stone that has not been able to be visualized and treated with ureteroscopy twice in the past.  Previously discussed ureteroscopy with possible PCNL as options, however today after discussion we agreed to only proceed with left ureteroscopy as management option.  After a discussion of all risks, benefits, and alternatives, the patient elected to proceed with left ureteroscopy.    DESCRIPTION OF PROCEDURE:  After informed consent was obtained, the patient was transported to the operating room & placed supine on the table. After adequate anesthesia was induced, the patient was placed in lithotomy and prepped and draped in the usual sterile fashion. A timeout was taken to confirm correct patient, procedure and laterality. Pre-operative IV antibiotics were administered.     A 22 fr cystoscope was inserted in the urethra.  There were no urethral or bladder pathology noted.  The ureteral orificies were orthotopic.  The left ureteral orifice was cannulated with a  straight tip 0.035 sensor tip guide wire to the collecting system without resistance.  On  image, the left renal stone was clearly seen, consistent with preop imaging.  On a high dose digital spot during retrograde pyelogram, there was a narrow infundibulum seen connecting the collecting system to the stone.    An 11-13 fr x 36 cm ureteral access sheath was inserted into the proximal ureter without resistance over the wire.  A Storz Flex XC digital ureteroscope was inserted in the collecting system.  I found the narrow infundibulum in the anterior left midpole.  There was a lower pole 2 mm stone with some surrounding plugs.    I turned my attention to the narrow infundibulum first.  I could see the stone at the end of the infundibulum.  I cannot get the scope down the entire length with gentle pressure or over wire.  Of note the sensor wire that I placed through the scope into the diverticulum perforated out of the back wall of the diverticulum around the stone. I elected to laser open the posterior wall (left side of the screen).  Using Rodrigo contact mode, I used settings of 0.8 J and 8 Hz to open the neck of the diverticulum.  There was relatively minimal mucosal ended up lasering down to level of fat.  There was no significant bleeding.  I was then easily able to enter the infundibulum down to the level of stone.  I broke the stone into multiple pieces using Rodrigo contact 0.8 J and 8 Hz.  The stones were removed by basket extraction.    I basketed the lower pole stone and lasered some of the plugs off of this lower pole calyx.  There is a small plug in the posterior aspect of the calyx that I also lasered off.     On pyeloscopy after stone removal, there is a little bit of bleeding from the posterior wall of the diverticulum with a wire perforated.  Otherwise the ureter was unremarkable without injury.  I placed the wire into the calyx and want to curl without perforation.  I then backed out the scope over  the wire.    I placed a 6 Nauruan by 26 cm black silicone Cook ureteral stent on a modified single string tether.  I attempted to place a stent within the calyx across the lasered infundibulum, however only the soft part of the wire within the calyx and stent flipped out.  I placed a stent with a curl in the upper pole and a good curl within the bladder on fluoroscopy.    They were awakened from anesthesia and transferred to the PACU.       POSTOP PLAN:  Follow-up KUB and renal ultrasound in 8 weeks  24-hour urine study  Follow-up visit after imaging in 8 weeks

## 2023-09-13 NOTE — ANESTHESIA PROCEDURE NOTES
Airway       Patient location during procedure: OR       Procedure Start/Stop Times: 9/13/2023 7:46 AM  Staff -        CRNA: Maddie Soriano APRN CRNA       Performed By: CRNA  Consent for Airway        Urgency: elective  Indications and Patient Condition       Indications for airway management: jay-procedural       Induction type:intravenous       Mask difficulty assessment: 1 - vent by mask    Final Airway Details       Final airway type: endotracheal airway       Successful airway: ETT - single  Endotracheal Airway Details        ETT size (mm): 7.0       Cuffed: yes       Successful intubation technique: direct laryngoscopy       DL Blade Type: MAC 3       Grade View of Cords: 1       Adjucts: stylet       Position: Right       Measured from: lips       Secured at (cm): 22       Bite block used: Soft    Post intubation assessment        Placement verified by: capnometry, equal breath sounds and chest rise        Number of attempts at approach: 1       Secured with: silk tape       Ease of procedure: easy       Dentition: Unchanged    Medication(s) Administered   Medication Administration Time: 9/13/2023 7:46 AM

## 2023-09-13 NOTE — INTERVAL H&P NOTE
"I have reviewed the surgical (or preoperative) H&P that is linked to this encounter, and examined the patient. Noted changes include: Discussed options today.  Patient's pain has been minimal and has a major life event in the next two weeks, and was concerned about recovery from PCNL.  Discussed risks and recovery between the two procedures.  After a detailed discussion consider her wishes, we decided to only pursue left ureteroscopy today.     We discussed the benefits and risks of ureteroscopy, including but not limited to, bleeding, infection, need for stent/stent related symptoms, injury to ureter, need for second procedure if unable to reach stone or residual fragments.       Clinical Conditions Present on Arrival:  Clinically Significant Risk Factors Present on Admission                  # Overweight: Estimated body mass index is 25.51 kg/m  as calculated from the following:    Height as of this encounter: 1.715 m (5' 7.5\").    Weight as of this encounter: 75 kg (165 lb 5.5 oz).       " continue home medication  - pantroprazole 40mg once daily patient current BP is 135/66.  Will continue home medications   - metoprolol XL- 100mg once daily.

## 2023-09-15 LAB
APPEARANCE STONE: NORMAL
COMPN STONE: NORMAL
SPECIMEN WT: 50 MG

## 2023-10-03 ENCOUNTER — MYC MEDICAL ADVICE (OUTPATIENT)
Dept: FAMILY MEDICINE | Facility: CLINIC | Age: 58
End: 2023-10-03
Payer: COMMERCIAL

## 2023-10-04 NOTE — TELEPHONE ENCOUNTER
Physical routine screening is always recommended yearly and is different than a preop.              Camila Pederson, FNP-BC

## 2023-11-09 ENCOUNTER — TRANSFERRED RECORDS (OUTPATIENT)
Dept: HEALTH INFORMATION MANAGEMENT | Facility: CLINIC | Age: 58
End: 2023-11-09
Payer: COMMERCIAL

## 2023-11-13 ENCOUNTER — HOSPITAL ENCOUNTER (OUTPATIENT)
Dept: GENERAL RADIOLOGY | Facility: CLINIC | Age: 58
Discharge: HOME | End: 2023-11-13
Attending: UROLOGY
Payer: COMMERCIAL

## 2023-11-13 ENCOUNTER — HOSPITAL ENCOUNTER (OUTPATIENT)
Dept: ULTRASOUND IMAGING | Facility: CLINIC | Age: 58
Discharge: HOME | End: 2023-11-13
Attending: UROLOGY
Payer: COMMERCIAL

## 2023-11-13 DIAGNOSIS — N20.0 KIDNEY STONE: ICD-10-CM

## 2023-11-13 PROCEDURE — 76775 US EXAM ABDO BACK WALL LIM: CPT

## 2023-11-13 PROCEDURE — 74018 RADEX ABDOMEN 1 VIEW: CPT

## 2023-11-17 NOTE — RESULT ENCOUNTER NOTE
Dear Valery,    Here is a summary of your recent test results:    You will a repeat colonoscopy in 5 years.     For additional lab test information, labtestsonline.org is an excellent reference.    In addition, here is a list of due or overdue Health Maintenance reminders:    Pneumococcal Vaccine(1 - PCV) Never done  Flu Vaccine(1) due on 09/01/2023  COVID-19 Vaccine(4 - 2023-24 season) due on 09/01/2023  Yearly Preventive Visit due on 12/06/2023  ANNUAL REVIEW OF HM ORDERS due on 12/06/2023    Please call us at [phone] (or use [employer]) to address the above recommendations if needed.    Thank you for choosing [facility].  It was an honor and a privilege to participate in your care.       Healthy regards,    Camila Pederson, THIERRY  [facility]

## 2023-11-20 ENCOUNTER — VIRTUAL VISIT (OUTPATIENT)
Dept: UROLOGY | Facility: CLINIC | Age: 58
End: 2023-11-20
Payer: COMMERCIAL

## 2023-11-20 VITALS — HEIGHT: 68 IN | BODY MASS INDEX: 25.51 KG/M2

## 2023-11-20 DIAGNOSIS — N20.0 CALCULUS OF KIDNEY: Primary | ICD-10-CM

## 2023-11-20 PROCEDURE — 99214 OFFICE O/P EST MOD 30 MIN: CPT | Mod: GT | Performed by: UROLOGY

## 2023-11-20 ASSESSMENT — PAIN SCALES - GENERAL: PAINLEVEL: NO PAIN (0)

## 2023-11-20 NOTE — NURSING NOTE
Is the patient currently in the state of [place]? YES    Visit mode:VIDEO    If the visit is dropped, the patient can be reconnected by: VIDEO VISIT: Text to cell phone:   Telephone Information:   Mobile [phone]       Will anyone else be joining the visit? NO  (If patient encounters technical issues they should call [phone]    How would you like to obtain your AVS? MyChart    Are changes needed to the allergy or medication list? No    Reason for visit: OLYA LOCKE

## 2023-11-20 NOTE — PROGRESS NOTES
Virtual Visit Details    Type of service:  Video Visit     Originating Location (pt. Location): Home    Distant Location (provider location):  On-site  Platform used for Video Visit: Yudith    Name: Khushi Sawant   MRN: [id number]  YOB: 1965    Assessment and Plan:  58 year old female with left flank pain and a left calcium oxalate monohydrate stone in calyceal diverticulum s/p ureteroscopy on September 13, 2023.  She is now pain free and doing well.  24 hr urine shows she is at low risk for stone disease and only has episodic hypercalciuria and hypokaluria and hypomagnesuria.    1.  Calcium oxalate monohydrate stone disease  2.  Left calyceal diverticulum, resolved  3.  Left flank pain, resolved  4.  Dietary management counseling    Discussed possible dietary and medical options to assist with stone prevention.  Based on stone analysis and 24 hr urine test, I counseled regarding:    Adequate dietary calcium of 1000 to 1200 mg a day.  Discussed how low dietary calcium intake may lead to increased stone formation. and Adequate fruit and vegetable intake to increase dietary citrate and raise urine pH.  Discussed that she can resume all grains except for spinach and enjoy nuts in moderation, especially walnuts, pecans, and pistachios.  Her low potassium and magnesium are likely be improved by improving her intake of greens.    Regarding follow-up, given she is low risk for future stone disease, we will get a KUB in 12 months and if that is negative, CT in 2 years, and if that is also negative we will stop imaging unless she develops symptoms.     Plan:  -Dietary Recs: increase calcium and increase plant and vegetable increase  -imaging (KUB) in 12 months with return visit    Hernan Jones MD  November 20, 2023         Chief Complaint: Stone follow-up    History of Present Illness:  Khushi Sawant is a 58 year old female seen after left ureteroscopy on 9/13/2023. Of note stone was behind a  "stenosed infundibulum and lasered opened.  She was having significant left flank pain which led us to the procedure, as she had undergone multiple negative prior ureteroscopies to locate the stone.    Stent was removed without issue. Since surgery, they did not have an unplanned clinic visit/ED visits/admissions.    Currently, they are experiencing no flank pain, no hematuria, or no dysuria.  They have not experienced recent stone passage.      Imaging (renal US and KUB on November 13, 2023, personally reviewed), demonstrated:  no left stone or hydronephrosis    Metabolic:  Medical risk factors: personal history  Stone analysis: COM  Stone culture: n/a  Blood work:   Latest Reference Range & Units 09/05/23 13:21   Sodium 136 - 145 mmol/L 140   Potassium 3.4 - 5.3 mmol/L 4.1   Chloride 98 - 107 mmol/L 104   Carbon Dioxide (CO2) 22 - 29 mmol/L 25   Urea Nitrogen 6.0 - 20.0 mg/dL 12.6   Creatinine 0.51 - 0.95 mg/dL 0.72   GFR Estimate >60 mL/min/1.73m2 >90   Calcium 8.6 - 10.0 mg/dL 10.0       24 hr urine study            Allergies:  Allergies   Allergen Reactions    Effexor [Venlafaxine] Anxiety            Medications:  Current Outpatient Medications   Medication Sig    Cranberry 500 MG TABS     lactobacillus rhamnosus (GG) (CULTURELL) capsule Take 1 capsule by mouth 2 times daily     Current Facility-Administered Medications   Medication    sodium chloride (PF) 0.9% PF flush 3 mL       Physical Exam:  B/P: [Unable to obtain from patient.[, T: Data Unavailable, P: Data Unavailable, R: Data Unavailable  Estimated body mass index is 25.51 kg/m  as calculated from the following:    Height as of this encounter: 1.715 m (5' 7.5\").    Weight as of 9/13/23: 75 kg (165 lb 5.5 oz).  General: age-appropriate appearing female in NAD.    "

## 2023-12-08 ENCOUNTER — LAB (OUTPATIENT)
Dept: LAB | Facility: CLINIC | Age: 58
End: 2023-12-08
Payer: COMMERCIAL

## 2023-12-08 DIAGNOSIS — Z79.899 MEDICATION MANAGEMENT: ICD-10-CM

## 2023-12-08 DIAGNOSIS — E55.9 VITAMIN D DEFICIENCY: ICD-10-CM

## 2023-12-08 DIAGNOSIS — Z00.00 ROUTINE HISTORY AND PHYSICAL EXAMINATION OF ADULT: Primary | ICD-10-CM

## 2023-12-08 LAB
ALBUMIN SERPL BCG-MCNC: 4.7 G/DL (ref 3.5–5.2)
ALP SERPL-CCNC: 63 U/L (ref 40–150)
ALT SERPL W P-5'-P-CCNC: 37 U/L (ref 0–50)
ANION GAP SERPL CALCULATED.3IONS-SCNC: 11 MMOL/L (ref 7–15)
AST SERPL W P-5'-P-CCNC: 38 U/L (ref 0–45)
BILIRUB SERPL-MCNC: 0.6 MG/DL
BUN SERPL-MCNC: 13 MG/DL (ref 6–20)
CALCIUM SERPL-MCNC: 9.7 MG/DL (ref 8.6–10)
CHLORIDE SERPL-SCNC: 100 MMOL/L (ref 98–107)
CHOLEST SERPL-MCNC: 216 MG/DL
CREAT SERPL-MCNC: 0.57 MG/DL (ref 0.51–0.95)
DEPRECATED HCO3 PLAS-SCNC: 26 MMOL/L (ref 22–29)
EGFRCR SERPLBLD CKD-EPI 2021: >90 ML/MIN/1.73M2
ERYTHROCYTE [DISTWIDTH] IN BLOOD BY AUTOMATED COUNT: 12.5 % (ref 10–15)
FASTING STATUS PATIENT QL REPORTED: YES
GLUCOSE SERPL-MCNC: 100 MG/DL (ref 70–99)
HBA1C MFR BLD: 4.9 % (ref 0–5.6)
HCT VFR BLD AUTO: 45.5 % (ref 35–47)
HDLC SERPL-MCNC: 73 MG/DL
HGB BLD-MCNC: 14.6 G/DL (ref 11.7–15.7)
LDLC SERPL CALC-MCNC: 129 MG/DL
MCH RBC QN AUTO: 31.1 PG (ref 26.5–33)
MCHC RBC AUTO-ENTMCNC: 32.1 G/DL (ref 31.5–36.5)
MCV RBC AUTO: 97 FL (ref 78–100)
NONHDLC SERPL-MCNC: 143 MG/DL
PLATELET # BLD AUTO: 142 10E3/UL (ref 150–450)
POTASSIUM SERPL-SCNC: 4.1 MMOL/L (ref 3.4–5.3)
PROT SERPL-MCNC: 7.3 G/DL (ref 6.4–8.3)
RBC # BLD AUTO: 4.7 10E6/UL (ref 3.8–5.2)
SODIUM SERPL-SCNC: 137 MMOL/L (ref 135–145)
TRIGL SERPL-MCNC: 71 MG/DL
TSH SERPL DL<=0.005 MIU/L-ACNC: 1.02 UIU/ML (ref 0.3–4.2)
WBC # BLD AUTO: 4.9 10E3/UL (ref 4–11)

## 2023-12-08 PROCEDURE — 85027 COMPLETE CBC AUTOMATED: CPT

## 2023-12-08 PROCEDURE — 80053 COMPREHEN METABOLIC PANEL: CPT

## 2023-12-08 PROCEDURE — 80061 LIPID PANEL: CPT

## 2023-12-08 PROCEDURE — 82306 VITAMIN D 25 HYDROXY: CPT

## 2023-12-08 PROCEDURE — 84443 ASSAY THYROID STIM HORMONE: CPT

## 2023-12-08 PROCEDURE — 83735 ASSAY OF MAGNESIUM: CPT

## 2023-12-08 PROCEDURE — 83036 HEMOGLOBIN GLYCOSYLATED A1C: CPT

## 2023-12-08 PROCEDURE — 36415 COLL VENOUS BLD VENIPUNCTURE: CPT

## 2023-12-08 ASSESSMENT — ENCOUNTER SYMPTOMS
NERVOUS/ANXIOUS: 0
SORE THROAT: 0
DYSURIA: 0
EYE PAIN: 0
CONSTIPATION: 0
HEMATURIA: 0
HEADACHES: 0
WEAKNESS: 0
ARTHRALGIAS: 1
SHORTNESS OF BREATH: 0
JOINT SWELLING: 0
FREQUENCY: 0
COUGH: 0
HEARTBURN: 0
DIZZINESS: 0
BREAST MASS: 0
ABDOMINAL PAIN: 0
NAUSEA: 0
PALPITATIONS: 0
FEVER: 0
MYALGIAS: 0
CHILLS: 0
DIARRHEA: 0
HEMATOCHEZIA: 0
PARESTHESIAS: 0

## 2023-12-11 ENCOUNTER — MYC MEDICAL ADVICE (OUTPATIENT)
Dept: UROLOGY | Facility: CLINIC | Age: 58
End: 2023-12-11

## 2023-12-11 ENCOUNTER — OFFICE VISIT (OUTPATIENT)
Dept: FAMILY MEDICINE | Facility: CLINIC | Age: 58
End: 2023-12-11
Payer: COMMERCIAL

## 2023-12-11 VITALS
HEIGHT: 67 IN | DIASTOLIC BLOOD PRESSURE: 86 MMHG | BODY MASS INDEX: 25.11 KG/M2 | WEIGHT: 160 LBS | HEART RATE: 85 BPM | SYSTOLIC BLOOD PRESSURE: 118 MMHG | OXYGEN SATURATION: 98 % | TEMPERATURE: 97.3 F | RESPIRATION RATE: 11 BRPM

## 2023-12-11 DIAGNOSIS — N20.0 CALCULUS OF KIDNEY: ICD-10-CM

## 2023-12-11 DIAGNOSIS — Z79.899 MEDICATION MANAGEMENT: ICD-10-CM

## 2023-12-11 DIAGNOSIS — Z00.00 ROUTINE GENERAL MEDICAL EXAMINATION AT A HEALTH CARE FACILITY: Primary | ICD-10-CM

## 2023-12-11 DIAGNOSIS — D69.6 THROMBOCYTOPENIA (H): ICD-10-CM

## 2023-12-11 DIAGNOSIS — E55.9 VITAMIN D DEFICIENCY: ICD-10-CM

## 2023-12-11 LAB
MAGNESIUM SERPL-MCNC: 2 MG/DL (ref 1.7–2.3)
VIT D+METAB SERPL-MCNC: 37 NG/ML (ref 20–50)

## 2023-12-11 PROCEDURE — 99213 OFFICE O/P EST LOW 20 MIN: CPT | Mod: 25 | Performed by: NURSE PRACTITIONER

## 2023-12-11 PROCEDURE — 99396 PREV VISIT EST AGE 40-64: CPT | Performed by: NURSE PRACTITIONER

## 2023-12-11 ASSESSMENT — ENCOUNTER SYMPTOMS
CONSTIPATION: 0
HEMATURIA: 0
MYALGIAS: 0
DYSURIA: 0
COUGH: 0
BREAST MASS: 0
FREQUENCY: 0
HEMATOCHEZIA: 0
DIARRHEA: 0
SHORTNESS OF BREATH: 0
FEVER: 0
ABDOMINAL PAIN: 0
SORE THROAT: 0
WEAKNESS: 0
ARTHRALGIAS: 1
DIZZINESS: 0
NERVOUS/ANXIOUS: 0
PARESTHESIAS: 0
EYE PAIN: 0
HEARTBURN: 0
CHILLS: 0
JOINT SWELLING: 0
HEADACHES: 0
PALPITATIONS: 0
NAUSEA: 0

## 2023-12-11 NOTE — PROGRESS NOTES
SUBJECTIVE:   Valery is a 58 year old, presenting for the following:  Physical        12/11/2023     9:43 AM   Additional Questions   Roomed by Angelito CMA   Accompanied by Self       Healthy Habits:     Getting at least 3 servings of Calcium per day:  Yes    Bi-annual eye exam:  Yes    Dental care twice a year:  NO    Sleep apnea or symptoms of sleep apnea:  None    Diet:  Low salt    Frequency of exercise:  4-5 days/week    Duration of exercise:  30-45 minutes    Taking medications regularly:  Yes    Medication side effects:  Not applicable    Additional concerns today:  No      NO concerns.   Follows with urology for kidney stones.     Labs recently done.   Platelets < 150.     Assessment and Plan:  58 year old female with left flank pain and a left calcium oxalate monohydrate stone in calyceal diverticulum s/p ureteroscopy on September 13, 2023.  She is now pain free and doing well.  24 hr urine shows she is at low risk for stone disease and only has episodic hypercalciuria and hypokaluria and hypomagnesuria.     1.  Calcium oxalate monohydrate stone disease  2.  Left calyceal diverticulum, resolved  3.  Left flank pain, resolved  4.  Dietary management counseling     Discussed possible dietary and medical options to assist with stone prevention.  Based on stone analysis and 24 hr urine test, I counseled regarding:     Adequate dietary calcium of 1000 to 1200 mg a day.  Discussed how low dietary calcium intake may lead to increased stone formation. and Adequate fruit and vegetable intake to increase dietary citrate and raise urine pH.  Discussed that she can resume all grains except for spinach and enjoy nuts in moderation, especially walnuts, pecans, and pistachios.  Her low potassium and magnesium are likely be improved by improving her intake of greens.     Regarding follow-up, given she is low risk for future stone disease, we will get a KUB in 12 months and if that is negative, CT in 2 years, and if that is  also negative we will stop imaging unless she develops symptoms.      Plan:  -Dietary Recs: increase calcium and increase plant and vegetable increase  -imaging (KUB) in 12 months with return visit     Hernan Jones MD    NO almonds or spinach.   Lemon   Hydrate      Social History     Tobacco Use     Smoking status: Never     Smokeless tobacco: Never   Substance Use Topics     Alcohol use: Yes     Comment: couple glasses of wine a week           2023     8:17 AM   Alcohol Use   Prescreen: >3 drinks/day or >7 drinks/week? No     Reviewed orders with patient.  Reviewed health maintenance and updated orders accordingly - Yes  Lab work is in process  Labs reviewed in EPIC  BP Readings from Last 3 Encounters:   23 118/86   23 (!) 149/73   23 (!) 157/82    Wt Readings from Last 3 Encounters:   23 72.6 kg (160 lb)   23 75 kg (165 lb 5.5 oz)   23 76.2 kg (168 lb)                  Patient Active Problem List   Diagnosis     Sessile colonic polyp  -  normal  [facility] - 5 year f/u recommended     S/P laparoscopic supracervical hysterectomy     History of Clostridioides difficile colitis     Kidney stone     Caliectasis     Past Surgical History:   Procedure Laterality Date     ABDOMEN SURGERY      2 c-sections      SECTION       COLONOSCOPY N/A 2015    Procedure: COLONOSCOPY w/ placement of hemoclip x2;  Surgeon: Nena Anaya MD;  Location: [facility];  Service:      COMBINED CYSTOSCOPY, INSERT STENT URETER(S) Right 2022    Procedure: CYSTOURETEROSCOPY, WITH LASER LITHOTRIPSY, CALCULUS REMOVAL AND URETERAL STENT INSERTION;  Surgeon: Garfield Paz MD;  Location: [facility] OR     COMBINED CYSTOSCOPY, INSERT STENT URETER(S) Left 2023    Procedure: CYSTOURETEROSCOPY, RETROGRADE PYELOGRAM, AND URETERAL STENT INSERTION;  Surgeon: Garfield Paz MD;  Location: [facility] OR     COMBINED CYSTOSCOPY, RETROGRADES,  URETEROSCOPY, LASER HOLMIUM LITHOTRIPSY URETER(S), INSERT STENT Left 2023    Procedure: CYSTOSCOPY WITH RETROGRADE PYELOGRAM, URETEROSCOPY, HOLMIUM LASER LITHOTRIPSY OF URETERAL CALCULUS, AND STENT INSERTION;  Surgeon: Hernan Jones MD;  Location: [facility] OR     GYN SURGERY      Laparoscopy Supracervical Hysterectomy     HYSTERECTOMY      for menorrhagia       Social History     Tobacco Use     Smoking status: Never     Smokeless tobacco: Never   Substance Use Topics     Alcohol use: Yes     Comment: couple glasses of wine a week     Family History   Problem Relation Age of Onset     Family History Negative Mother      Coronary Artery Disease Mother         A fib     Hyperlipidemia Mother      Family History Negative Father      Diabetes Father         weight controlled         Current Outpatient Medications   Medication Sig Dispense Refill     Cranberry 500 MG TABS        lactobacillus rhamnosus (GG) (CULTURELL) capsule Take 1 capsule by mouth 2 times daily       Allergies   Allergen Reactions     Effexor [Venlafaxine] Anxiety       Breast Cancer Screenin/25/2022     8:24 AM 2022     9:28 AM   Breast CA Risk Assessment (FHS-7)   Do you have a family history of breast, colon, or ovarian cancer? No / Unknown No / Unknown         Mammogram Screening: Recommended mammography every 1-2 years with patient discussion and risk factor consideration  Pertinent mammograms are reviewed under the imaging tab.    History of abnormal Pap smear: NO - age 30-65 PAP every 5 years with negative HPV co-testing recommended      Latest Ref Rng & Units 10/12/2021     9:03 AM 2013    12:00 AM 2011    12:00 AM   PAP / HPV   PAP (Historical) Negative  NIL     NIL       PAP-ABSTRACT  See Scanned Document             This result is from an external source.     Reviewed and updated as needed this visit by clinical staff   Tobacco  Allergies  Meds  Problems  Med Hx  Surg Hx  Fam Hx          Reviewed and  "updated as needed this visit by Provider   Tobacco  Allergies  Meds  Problems  Med Hx  Surg Hx  Fam Hx           Review of Systems   Constitutional:  Negative for chills and fever.   HENT:  Negative for congestion, ear pain, hearing loss and sore throat.    Eyes:  Negative for pain and visual disturbance.   Respiratory:  Negative for cough and shortness of breath.    Cardiovascular:  Negative for chest pain, palpitations and peripheral edema.   Gastrointestinal:  Negative for abdominal pain, constipation, diarrhea, heartburn, hematochezia and nausea.   Breasts:  Negative for tenderness, breast mass and discharge.   Genitourinary:  Positive for pelvic pain. Negative for dysuria, frequency, genital sores, hematuria, urgency, vaginal bleeding and vaginal discharge.   Musculoskeletal:  Positive for arthralgias. Negative for joint swelling and myalgias.   Skin:  Negative for rash.   Neurological:  Negative for dizziness, weakness, headaches and paresthesias.   Psychiatric/Behavioral:  Negative for mood changes. The patient is not nervous/anxious.        OBJECTIVE:   /86 (BP Location: Left arm, Patient Position: Chair, Cuff Size: Adult Large)   Pulse 85   Temp 97.3  F (36.3  C) (Tympanic)   Resp 11   Ht 1.693 m (5' 6.65\")   Wt 72.6 kg (160 lb)   SpO2 98%   BMI 25.32 kg/m    Physical Exam  GENERAL: healthy, alert and no distress  EYES: Eyes grossly normal to inspection, PERRL and conjunctivae and sclerae normal  HENT: ear canals and TM's normal, nose and mouth without ulcers or lesions  NECK: no adenopathy, no asymmetry, masses, or scars and thyroid normal to palpation  RESP: lungs clear to auscultation - no rales, rhonchi or wheezes  BREAST: normal without masses, tenderness or nipple discharge and no palpable axillary masses or adenopathy  CV: regular rate and rhythm, normal S1 S2, no S3 or S4, no murmur, click or rub, no peripheral edema and peripheral pulses strong  ABDOMEN: soft, nontender, no " "hepatosplenomegaly, no masses and bowel sounds normal  MS: no gross musculoskeletal defects noted, no edema  SKIN: no suspicious lesions or rashes  NEURO: Normal strength and tone, mentation intact and speech normal  PSYCH: mentation appears normal, affect normal/bright    Labs reviewed in Epic    ASSESSMENT/PLAN:   Valery was seen today for physical.    Diagnoses and all orders for this visit:    Routine general medical examination at a health care facility  Well woman exam with breast exam completed today.    Labs already done reviewed.   Khushi verbalizes understanding of plan of care and is in agreement.   -     REVIEW OF HEALTH MAINTENANCE PROTOCOL ORDERS    Thrombocytopenia (H24)  Repeat labs; peripheral smear if abnormal.   Cont to monitor be seen if bruising or bleeding easily.   -     Cancel: CBC with platelets; Future    Medication management  -     Magnesium; Future    Calculus of kidney    Vitamin D deficiency  -     Vitamin D Deficiency; Future        Patient has been advised of split billing requirements and indicates understanding: Yes      COUNSELING:  Reviewed preventive health counseling, as reflected in patient instructions      BMI:   Estimated body mass index is 25.32 kg/m  as calculated from the following:    Height as of this encounter: 1.693 m (5' 6.65\").    Weight as of this encounter: 72.6 kg (160 lb).         She reports that she has never smoked. She has never used smokeless tobacco.             ROSE MARIE Vergara [facility] [place]  "

## 2023-12-13 ENCOUNTER — MYC MEDICAL ADVICE (OUTPATIENT)
Dept: FAMILY MEDICINE | Facility: CLINIC | Age: 58
End: 2023-12-13
Payer: COMMERCIAL

## 2023-12-13 DIAGNOSIS — D69.6 THROMBOCYTOPENIA (H): Primary | ICD-10-CM

## 2023-12-14 NOTE — TELEPHONE ENCOUNTER
Please see my chart message below     Please review and advise     Thank you     Genie Boothe RN, BSN  [place] [facility]      See Debbie in 3 months return [facility]

## 2023-12-20 ENCOUNTER — LAB (OUTPATIENT)
Dept: LAB | Facility: CLINIC | Age: 58
End: 2023-12-20
Payer: COMMERCIAL

## 2023-12-20 DIAGNOSIS — D69.6 THROMBOCYTOPENIA (H): ICD-10-CM

## 2023-12-20 LAB
BASOPHILS # BLD AUTO: 0 10E3/UL (ref 0–0.2)
BASOPHILS NFR BLD AUTO: 0 %
EOSINOPHIL # BLD AUTO: 0 10E3/UL (ref 0–0.7)
EOSINOPHIL NFR BLD AUTO: 1 %
ERYTHROCYTE [DISTWIDTH] IN BLOOD BY AUTOMATED COUNT: 12.3 % (ref 10–15)
HCT VFR BLD AUTO: 43 % (ref 35–47)
HGB BLD-MCNC: 14.2 G/DL (ref 11.7–15.7)
IMM GRANULOCYTES # BLD: 0 10E3/UL
IMM GRANULOCYTES NFR BLD: 0 %
LYMPHOCYTES # BLD AUTO: 2.4 10E3/UL (ref 0.8–5.3)
LYMPHOCYTES NFR BLD AUTO: 49 %
MCH RBC QN AUTO: 31.6 PG (ref 26.5–33)
MCHC RBC AUTO-ENTMCNC: 33 G/DL (ref 31.5–36.5)
MCV RBC AUTO: 96 FL (ref 78–100)
MONOCYTES # BLD AUTO: 0.3 10E3/UL (ref 0–1.3)
MONOCYTES NFR BLD AUTO: 5 %
NEUTROPHILS # BLD AUTO: 2.3 10E3/UL (ref 1.6–8.3)
NEUTROPHILS NFR BLD AUTO: 45 %
PLATELET # BLD AUTO: 158 10E3/UL (ref 150–450)
RBC # BLD AUTO: 4.5 10E6/UL (ref 3.8–5.2)
RETICS # AUTO: 0.06 10E6/UL (ref 0.03–0.1)
RETICS/RBC NFR AUTO: 1.3 % (ref 0.5–2)
WBC # BLD AUTO: 5 10E3/UL (ref 4–11)

## 2023-12-20 PROCEDURE — 99207 BLOOD MORPHOLOGY PATHOLOGIST REVIEW: CPT | Performed by: PATHOLOGY

## 2023-12-20 PROCEDURE — 85045 AUTOMATED RETICULOCYTE COUNT: CPT

## 2023-12-20 PROCEDURE — 85025 COMPLETE CBC W/AUTO DIFF WBC: CPT

## 2023-12-20 PROCEDURE — 36415 COLL VENOUS BLD VENIPUNCTURE: CPT

## 2023-12-22 NOTE — RESULT ENCOUNTER NOTE
Dear Valery,    Here is a summary of your recent test results:    Just reaching out before the holiday; all of your labs are not back yet but the platelet count is now back to normal so that is great.     Thank you for choosing [facility].  It was an honor and a privilege to participate in your care.       Happiest Holiday.     THIERRY Vergara  [facility]

## 2023-12-23 LAB
PATH REPORT.COMMENTS IMP SPEC: NORMAL
PATH REPORT.FINAL DX SPEC: NORMAL
PATH REPORT.MICROSCOPIC SPEC OTHER STN: NORMAL
PATH REPORT.MICROSCOPIC SPEC OTHER STN: NORMAL
PATH REPORT.RELEVANT HX SPEC: NORMAL

## 2023-12-23 NOTE — RESULT ENCOUNTER NOTE
Dear Valery,    Here is a summary of your recent test results:    Great news Blood work is normal. Normal red blood cell (hgb) levels, normal white blood cell count and normal platelet levels.    Please call us at [phone] (or use [email or web address]) to address the above recommendations if needed.    Thank you for choosing [facility].  It was an honor and a privilege to participate in your care.       Healthy regards and Happy Holidays,    THIERRY Vergara  [facility]

## 2023-12-28 NOTE — PROGRESS NOTES
This writer has been in contact with [facility]. Pt's concerns about $1000 bill has been escalated to their Care Manager. I have provided Robyn with pt's phone number to answer direct billing questions.     BOAZ CORTES RN  12/28/23

## 2024-01-22 ENCOUNTER — TELEPHONE (OUTPATIENT)
Dept: UROLOGY | Facility: CLINIC | Age: 59
End: 2024-01-22
Payer: COMMERCIAL

## 2024-01-22 NOTE — TELEPHONE ENCOUNTER
This writer spoke with pt regarding her [employer] bill. I explained that [employer] is a 3rd party vendor that does their own billing. I gave Valery the contact information for Maci ValdezFranco Cell: [phone] from [employer] who is helping with this billing concern.     I also gave Valery the number for the billing dispute line, which she has already contacted. They were unable to assist her with her billing concern.     BOAZ CORTES RN  01/22/24

## 2024-01-23 ENCOUNTER — E-VISIT (OUTPATIENT)
Dept: FAMILY MEDICINE | Facility: CLINIC | Age: 59
End: 2024-01-23
Payer: COMMERCIAL

## 2024-01-23 DIAGNOSIS — Z87.442 HISTORY OF KIDNEY STONES: Primary | ICD-10-CM

## 2024-01-23 DIAGNOSIS — M54.9 BACK PAIN, UNSPECIFIED BACK LOCATION, UNSPECIFIED BACK PAIN LATERALITY, UNSPECIFIED CHRONICITY: ICD-10-CM

## 2024-01-23 DIAGNOSIS — S39.92XA INJURY OF BACK, INITIAL ENCOUNTER: ICD-10-CM

## 2024-01-23 PROCEDURE — 99422 OL DIG E/M SVC 11-20 MIN: CPT | Performed by: NURSE PRACTITIONER

## 2024-01-23 RX ORDER — ACETAMINOPHEN AND CODEINE PHOSPHATE 300; 30 MG/1; MG/1
1 TABLET ORAL EVERY 6 HOURS PRN
Qty: 10 TABLET | Refills: 0 | Status: SHIPPED | OUTPATIENT
Start: 2024-01-23

## 2024-01-23 RX ORDER — PREDNISONE 20 MG/1
TABLET ORAL
Qty: 9 TABLET | Refills: 0 | Status: SHIPPED | OUTPATIENT
Start: 2024-01-23 | End: 2024-01-29

## 2024-01-23 RX ORDER — CYCLOBENZAPRINE HCL 5 MG
5-10 TABLET ORAL 3 TIMES DAILY PRN
Qty: 20 TABLET | Refills: 0 | Status: SHIPPED | OUTPATIENT
Start: 2024-01-23

## 2024-01-23 NOTE — PATIENT INSTRUCTIONS
Valery,     Thank you for choosing us for your care. I have placed an order for steroids, Flexeril, and Tylenol 3 so that you can start treatment.   I also want to check your urine just make sure not missing a stone.  You can set up a lab only appointment for this.  Please be seen if symptoms worsen at all.    You can schedule an appointment right here in [facility], or call [phone]  If the visit is for the same symptoms as your eVisit, we'll refund the cost of your eVisit if seen within seven days.        Healthy regards,            Camila Pederson, [facility]-BC   Caring for Your Back    You are not alone.    Low back pain is very common. Nearly half of all adults will have low back pain in any given year. The good news is that back pain is rarely a danger to your health. Most people can manage their back pain on their own. About half of people start feeling better within two weeks. In 9 out of 10 cases, low back pain goes away or no longer limits daily activity within 6 weeks.     Your outlook is good!     Your symptoms tell us that your low back pain is most likely not a danger to you. Most of the time we will not know the exact cause of low back pain, even if you see a doctor or have an MRI. However, treatment can still work without knowing the cause of the pain. Less than 1 in 100 people need surgery for their back pain.     What can I do about my low back pain?     There are three basic things you can do to ease low back pain and help it go away.     Use heat or cold packs.    Take medicine as directed.    Use positions, movements and exercises.    Using heat or cold packs:    Try cold packs or gentle heat to ease your pain.  Use whichever gives you the most relief. Apply the cold pack or heat for 15 minutes at a time, as often as needed.    Taking medicine:    If taking over-the-counter medicine:    Take ibuprofen (Advil, Motrin) 600 mg three times a day as needed for pain.  OR    Take Aleve (naproxen) 220 to 440 mg  two times a day as needed for pain. If your doctor prescribed a muscle relaxant (cyclobenzaprine 10 mg.):    Take   to 1 tablet at bedtime.    Do not drive when taking this medicine. This drug may make you sleepy.     Using positions, movements and exercises:    Research tells us that moving your joints and muscles can help you recover from back pain. Such activity should be simple and gentle. Use the positions below as well as walking to help relieve your pain. Try taking a short walk every 3 to 4 hours during the day. Walk for a few minutes inside your home or take longer walks outside, on a treadmill or at a mall. Slowly increase the amount of time you walk. Expect discomfort when you begin, but it should lessen as your back starts to heal. When your back feels better, walk daily to keep your back and body healthy.    Finding a position that is comfortable:    When your back pain is new, certain positions will ease your pain. Gently try each of the positions below until you find one that is helpful. Once you find a position of comfort, use it as often as you like when you are resting. You will recover faster if you combine rest with activity.    * Lie on your back with your legs bent. You can do this by placing a pillow under your knees or lie on the floor and rest your lower legs on the seat of a chair.  * Lie on your side with your knees bent and place a pillow between your knees.    Lie on your stomach over pillows.       When should I call my doctor?    Your back pain should improve over the first couple of weeks. As it improves, you should be able to return to your normal activities.  But call your doctor if:      You have a sudden change in your ability to control your bladder or bowels.    You begin to feel tingling in your groin or legs.    The pain spreads down your leg and into your foot.    Your toes, feet or leg muscles begin to feel weak.    You feel generally unwell or sick.    Your pain gets  worse.    If you are deaf or hard of hearing, please let us know. We provide many free services including sign language interpreters, oral interpreters, TTYs, telephone amplifiers, note takers and written materials.    For informational purposes only. Not to replace the advice of your health care provider. Copyright   2013 [facility]. All rights reserved. [employer] [id number] - 04/13.

## 2024-01-23 NOTE — TELEPHONE ENCOUNTER
Provider E-Visit time total (minutes): 11 minutes.     Healthy regards,            Camila Pederson, FNP-BC

## 2024-03-04 ENCOUNTER — TRANSFERRED RECORDS (OUTPATIENT)
Dept: HEALTH INFORMATION MANAGEMENT | Facility: CLINIC | Age: 59
End: 2024-03-04
Payer: COMMERCIAL

## 2025-03-12 ENCOUNTER — TRANSFERRED RECORDS (OUTPATIENT)
Dept: HEALTH INFORMATION MANAGEMENT | Facility: CLINIC | Age: 60
End: 2025-03-12
Payer: COMMERCIAL

## 2025-03-12 NOTE — LETTER
[facility]  [address] [place] [address]  [phone]                    March 20, 2025    Khushi Sawant  [address] [place] [address]      Dear Khushi,    Here is a summary of your recent test results: Mammogram was normal.  ADVISE: rechecking in 1 year. For additional lab test information, [email or web address] is an excellent reference.     Your test results are enclosed.      Please contact me if you have any questions.    In addition, here is a list of due or overdue Health Maintenance reminders.    Health Maintenance Due   Topic Date Due    Pneumococcal Vaccine (1 of 1 - PCV) Never done    Flu Vaccine (1) 09/01/2024    COVID-19 Vaccine (4 - 2024-25 season) 09/01/2024    Yearly Preventive Visit  12/11/2024    ANNUAL REVIEW OF HM ORDERS  12/11/2024    PHQ-2 (once per calendar year)  01/01/2025       Please call us at [phone] (or use [employer]) to address the above recommendations.            Thank you very much for trusting [facility].     Healthy regards,      Camila Pederson, THIERRY-BC

## 2025-03-19 NOTE — RESULT ENCOUNTER NOTE
Please send letter.     Dear Valery,    Here is a summary of your recent test results:    Mammogram was normal.  ADVISE: rechecking in 1 year.    For additional lab test information, [email or web address] is an excellent reference.    In addition, here is a list of due or overdue Health Maintenance reminders:    Pneumococcal Vaccine(1 of 1 - PCV) Never done  Flu Vaccine(1) due on 09/01/2024  COVID-19 Vaccine(4 - 2024-25 season) due on 09/01/2024  Yearly Preventive Visit due on 12/11/2024  ANNUAL REVIEW OF HM ORDERS due on 12/11/2024  PHQ-2 (once per calendar year) due on 01/01/2025    Please call us at [phone] (or use [employer]) to address the above recommendations if needed.    Thank you for choosing [facility].  It was an honor and a privilege to participate in your care.       Healthy regards,    Camila Pederson, THIERRY  [facility]

## 2025-07-18 PROBLEM — Z85.828 HISTORY OF BASAL CELL CARCINOMA: Status: ACTIVE | Noted: 2025-07-18

## 2025-07-29 ENCOUNTER — RESULTS FOLLOW-UP (OUTPATIENT)
Dept: FAMILY MEDICINE | Facility: CLINIC | Age: 60
End: 2025-07-29
Payer: COMMERCIAL

## 2025-07-29 NOTE — TELEPHONE ENCOUNTER
Her cholesterol was ordered not sure why it did not get done.   She can come to clinic fasting any time to complete.     Healthy regards,            Camila Pederson, FNP-BC

## 2025-07-29 NOTE — RESULT ENCOUNTER NOTE
Dear Valery,     Here is a summary of your recent test results:    -All of your labs are normal.    For additional lab test information, [email or web address] is an excellent reference.    In addition, here is a list of due or overdue Health Maintenance reminders:    Pneumococcal Vaccine(1 of 1 - PCV) Never done  Cholesterol Lab due on 12/08/2024  ANNUAL REVIEW OF HM ORDERS due on 12/11/2024    Please call us at [phone] (or use [employer]) to address the above recommendations if needed.    Thank you for choosing [facility].  It was an honor and a privilege to participate in your care.       Healthy regards,    Camila Pederson, THIERRY  [facility]

## 2025-07-29 NOTE — TELEPHONE ENCOUNTER
Pt would like when to have lipid panel recheck clarified.    -Received notification in [employer] for lipid panel 12/8/2024  -Pt was told labs to be done 1/2026    Please advise.

## (undated) DEVICE — LASER FIBER HOLMIUM MOSES 200 D/F/L AC-10030100

## (undated) DEVICE — STRAP KNEE/BODY 31143004

## (undated) DEVICE — NDL PERC ACCESS NAVIGUIDE W/SLDER 18GX20CM M0067001330

## (undated) DEVICE — SOL WATER IRRIG 1000ML BOTTLE 2F7114

## (undated) DEVICE — PREP DYNA-HEX 4% CHG SCRUB 4OZ BOTTLE MDS098710

## (undated) DEVICE — GUIDEWIRE ZIPWIRE NITINOL STR 0.035"X150CM M0066802050

## (undated) DEVICE — CATH ANGIO KUMPE SOFT-VU 5FRX65CM CVD H787107327015

## (undated) DEVICE — GUIDEWIRE AMPLATZ SUPER STIFF .035 X 145CM M0066401080

## (undated) DEVICE — CATH URETERAL OPEN END 5FRX70CM M0064002010

## (undated) DEVICE — BASKET STONE RETRIEVAL NTNL ZERO TIP 1.9FRX90CM M0063901050

## (undated) DEVICE — SOL NACL 0.9% IRRIG 1000ML BOTTLE 2F7124

## (undated) DEVICE — PAD CHUX UNDERPAD 30X36" P3036C

## (undated) DEVICE — NDL COUNTER 40CT  31142311

## (undated) DEVICE — Device

## (undated) DEVICE — LINEN GOWN X4 5410

## (undated) DEVICE — GUIDEWIRE SENSOR DUAL FLEX STR 0.035"X150CM M0066703080

## (undated) DEVICE — SOL NACL 0.9% IRRIG 3000ML BAG 2B7477

## (undated) DEVICE — SYR 30ML LL W/O NDL 302832

## (undated) DEVICE — DILATOR RENAL AMPLATZ TYPE 8FRX35CM M0062601010

## (undated) DEVICE — TUBING EXTENSION SET 20" B1327

## (undated) DEVICE — GOWN IMPERVIOUS SPECIALTY XLG/XLONG 32474

## (undated) DEVICE — LINEN ORTHO PACK 5446

## (undated) DEVICE — SUCTION MANIFOLD NEPTUNE 2 SYS 4 PORT 0702-020-000

## (undated) DEVICE — CONTAINER SPECIMEN 4OZ STERILE 17099

## (undated) DEVICE — SHEATH URETERAL ACCESS NAVIGATOR HD 11/13FRX36CM M0062502220

## (undated) DEVICE — GLOVE BIOGEL PI MICRO SZ 7.0 48570

## (undated) DEVICE — PAD FLOOR SURGSAFE 30X40" 83030

## (undated) DEVICE — DRAPE MAYO STAND 23X54 8337

## (undated) DEVICE — TUBING SUCTION MEDI-VAC 1/4"X20' N620A

## (undated) DEVICE — CATH URETERAL DUAL LUMEN 10FRX54CM M0064051000

## (undated) DEVICE — TUBING SET THERMEDX UROLOGY SGL USE LL0006

## (undated) DEVICE — DRAPE C-ARM W/STRAPS 42X72" 07-CA104

## (undated) DEVICE — LINEN TOWEL PACK X5 5464

## (undated) DEVICE — ADAPTER SCOPE UROLOK II LF M0067301400

## (undated) RX ORDER — FENTANYL CITRATE-0.9 % NACL/PF 10 MCG/ML
PLASTIC BAG, INJECTION (ML) INTRAVENOUS
Status: DISPENSED
Start: 2023-09-13

## (undated) RX ORDER — FENTANYL CITRATE 50 UG/ML
INJECTION, SOLUTION INTRAMUSCULAR; INTRAVENOUS
Status: DISPENSED
Start: 2023-09-13

## (undated) RX ORDER — KETOROLAC TROMETHAMINE 30 MG/ML
INJECTION, SOLUTION INTRAMUSCULAR; INTRAVENOUS
Status: DISPENSED
Start: 2023-09-13

## (undated) RX ORDER — EPHEDRINE SULFATE 50 MG/ML
INJECTION, SOLUTION INTRAMUSCULAR; INTRAVENOUS; SUBCUTANEOUS
Status: DISPENSED
Start: 2023-09-13

## (undated) RX ORDER — GLYCOPYRROLATE 0.2 MG/ML
INJECTION, SOLUTION INTRAMUSCULAR; INTRAVENOUS
Status: DISPENSED
Start: 2023-09-13

## (undated) RX ORDER — ONDANSETRON 2 MG/ML
INJECTION INTRAMUSCULAR; INTRAVENOUS
Status: DISPENSED
Start: 2023-09-13

## (undated) RX ORDER — DEXAMETHASONE SODIUM PHOSPHATE 4 MG/ML
INJECTION, SOLUTION INTRA-ARTICULAR; INTRALESIONAL; INTRAMUSCULAR; INTRAVENOUS; SOFT TISSUE
Status: DISPENSED
Start: 2023-09-13

## (undated) RX ORDER — SODIUM CHLORIDE, SODIUM LACTATE, POTASSIUM CHLORIDE, CALCIUM CHLORIDE 600; 310; 30; 20 MG/100ML; MG/100ML; MG/100ML; MG/100ML
INJECTION, SOLUTION INTRAVENOUS
Status: DISPENSED
Start: 2023-09-13

## (undated) RX ORDER — PROPOFOL 10 MG/ML
INJECTION, EMULSION INTRAVENOUS
Status: DISPENSED
Start: 2023-09-13